# Patient Record
Sex: FEMALE | Race: WHITE | NOT HISPANIC OR LATINO | Employment: OTHER | RURAL
[De-identification: names, ages, dates, MRNs, and addresses within clinical notes are randomized per-mention and may not be internally consistent; named-entity substitution may affect disease eponyms.]

---

## 2019-01-17 RX ORDER — BUPROPION HYDROCHLORIDE 300 MG/1
TABLET ORAL
Qty: 30 TABLET | Refills: 5 | Status: SHIPPED | OUTPATIENT
Start: 2019-01-17 | End: 2019-07-17 | Stop reason: SDUPTHER

## 2019-01-22 ENCOUNTER — TELEPHONE (OUTPATIENT)
Dept: FAMILY MEDICINE CLINIC | Facility: CLINIC | Age: 54
End: 2019-01-22

## 2019-01-22 NOTE — TELEPHONE ENCOUNTER
called in for PT. Would like call in regards to referral to Orthopedic Doctor for shoulder issues.

## 2019-01-22 NOTE — TELEPHONE ENCOUNTER
Called  for PT. Was not available for call.  left message for PT stating that she would need to be seen before referral would be made.

## 2019-03-07 ENCOUNTER — TELEPHONE (OUTPATIENT)
Dept: FAMILY MEDICINE CLINIC | Facility: CLINIC | Age: 54
End: 2019-03-07

## 2019-03-07 NOTE — TELEPHONE ENCOUNTER
Please let patient know that we can't make a referral without office notes and test results.  I did send in a script of Voltaren for her.

## 2019-03-07 NOTE — TELEPHONE ENCOUNTER
For Isa *  She has an old  Rx of VOLTAREN GEL from Dr. Sally Hunter,   Pt is wondering if she can get a refill on this, for her back spasms.   * call was through  .     Also would like to know a back surgeon , she is needing repair. What is your opinion on the best surgeons to refer too.   Patient does not wish to make an appt, I have offered one for tomorrow or Monday    Please call - [phone] and leave message if she doesn't answer.

## 2019-07-18 RX ORDER — BUPROPION HYDROCHLORIDE 300 MG/1
TABLET ORAL
Qty: 30 TABLET | Refills: 5 | Status: SHIPPED | OUTPATIENT
Start: 2019-07-18 | End: 2020-06-23 | Stop reason: SDUPTHER

## 2019-08-26 ENCOUNTER — TELEPHONE (OUTPATIENT)
Dept: NEUROSURGERY | Age: 54
End: 2019-08-26

## 2019-08-30 ENCOUNTER — TELEPHONE (OUTPATIENT)
Dept: NEUROSURGERY | Age: 54
End: 2019-08-30

## 2019-11-05 PROBLEM — M96.1 LUMBAR POST-LAMINECTOMY SYNDROME: Status: ACTIVE | Noted: 2019-11-05

## 2019-11-05 PROBLEM — M51.37 DEGENERATION OF LUMBOSACRAL INTERVERTEBRAL DISC: Status: ACTIVE | Noted: 2019-11-05

## 2019-11-05 RX ORDER — METHOCARBAMOL 750 MG/1
750 TABLET, FILM COATED ORAL 4 TIMES DAILY PRN
COMMUNITY
End: 2020-10-06 | Stop reason: SDUPTHER

## 2019-11-05 RX ORDER — TEMAZEPAM 30 MG/1
1 CAPSULE ORAL DAILY
COMMUNITY
End: 2020-10-06 | Stop reason: SDUPTHER

## 2019-11-05 RX ORDER — PANTOPRAZOLE SODIUM 40 MG/1
TABLET, DELAYED RELEASE ORAL
COMMUNITY
End: 2020-09-28 | Stop reason: ALTCHOICE

## 2019-11-05 RX ORDER — MELATONIN
1000 DAILY
COMMUNITY
End: 2019-11-21 | Stop reason: ALTCHOICE

## 2019-11-21 ENCOUNTER — OFFICE VISIT (OUTPATIENT)
Dept: FAMILY MEDICINE CLINIC | Facility: CLINIC | Age: 54
End: 2019-11-21

## 2019-11-21 VITALS
HEART RATE: 72 BPM | BODY MASS INDEX: 23.8 KG/M2 | OXYGEN SATURATION: 95 % | DIASTOLIC BLOOD PRESSURE: 76 MMHG | WEIGHT: 139.4 LBS | HEIGHT: 64 IN | RESPIRATION RATE: 18 BRPM | SYSTOLIC BLOOD PRESSURE: 118 MMHG

## 2019-11-21 DIAGNOSIS — M25.552 PAIN OF LEFT HIP JOINT: ICD-10-CM

## 2019-11-21 DIAGNOSIS — G89.29 CHRONIC MIDLINE LOW BACK PAIN WITH LEFT-SIDED SCIATICA: ICD-10-CM

## 2019-11-21 DIAGNOSIS — R25.2 MUSCLE CRAMPS: ICD-10-CM

## 2019-11-21 DIAGNOSIS — R53.83 FATIGUE, UNSPECIFIED TYPE: Primary | ICD-10-CM

## 2019-11-21 DIAGNOSIS — M54.42 CHRONIC MIDLINE LOW BACK PAIN WITH LEFT-SIDED SCIATICA: ICD-10-CM

## 2019-11-21 DIAGNOSIS — R63.4 WEIGHT LOSS: ICD-10-CM

## 2019-11-21 PROCEDURE — 99214 OFFICE O/P EST MOD 30 MIN: CPT | Performed by: NURSE PRACTITIONER

## 2019-11-21 RX ORDER — FLUTICASONE PROPIONATE 50 MCG
2 SPRAY, SUSPENSION (ML) NASAL DAILY
COMMUNITY
End: 2021-03-05

## 2019-11-21 NOTE — PATIENT INSTRUCTIONS
Ketogenic Eating Plan, Adult  A ketogenic eating plan is a diet that is very low in carbohydrates, moderately low in protein, and very high in fat. Your body normally gets energy from eating carbohydrates. If you limit carbohydrates in your diet, your body will start to use stored fat as an energy source. When fat is broken down for energy, it enters your blood as a substance called ketones. A ketogenic eating plan relies mostly on ketones for energy. This eating plan can cause rapid weight loss because the body burns fat for fuel.  What are the benefits of a ketogenic eating plan?  Epilepsy  A ketogenic diet is sometimes used to treat epilepsy in children who have seizures that are not helped by medicines. As a treatment for epilepsy in children, ketogenic eating plans have been well studied and used for many years. This type of diet is usually started in the hospital and carefully monitored by a health care team. It is usually tried for several months to see if it will lessen seizures.  Other conditions  Ketogenic eating plans have also been studied to help treat other conditions, including:  · Cancer.  · Type 2 diabetes.  · Alzheimer's disease.  · Parkinson's disease.  · Autism.  · Multiple sclerosis.  More studies need to be done to see how well this eating plan works for these and other conditions and what the long-term side effects might be.  What are the side effects of a ketogenic eating plan?  Side effects of a ketogenic diet may include:  · Vitamin deficiencies.  · Loss of body fluids (dehydration).  · Bad breath.  · Nausea.  · Hunger.  · Difficulty passing stool (constipation).  · Problems with menstrual periods.  · Inflammation of the pancreas.  · Kidney stones or gall bladder stones.  · Bone fractures.  · High cholesterol.  What are tips for following this plan?  Reading food labels  · Look for foods that have a low glycemic index (GI) label.  · Read food ingredient lists to check for any hidden or  added sugar.  · Check food labels for the number of grams of carbohydrates and protein. This ensures that you are eating the right amounts of these nutrients. This is important.  Cooking  · Carefully measure or weigh foods.  · Make desserts using ketogenic or low GI recipes.  · Avoid cooking with sauces that contain added sugar, such as barbecue sauce or ketchup.  Meal planning  · Aim for a daily meal and snack time schedule that you can follow consistently.  · Every meal will include high-fat items such as avocado, cream, butter, or mayonnaise.  General information    · Buy a gram scale to weigh foods. This is necessary to follow this diet correctly and accurately. Your dietitian will teach you how to use one for this diet.  · Ask your health care provider what steps you can take to avoid side effects of this eating plan, such as constipation or kidney stones.  · Take vitamin and mineral supplements only as told by a health care provider or dietitian.  · Work with your dietitian and health care provider to handle the key challenges of this diet and to help you stay on track.  What foods should I eat?    Fruits  Fresh pineapple, peaches, and apples. Other fresh and frozen fruits in small amounts.  Vegetables  Lettuce. Beets. Bok annie. Eggplant. Tomatoes. Turnips. Cucumbers. Peppers. Radishes. Cauliflower. Zucchini. Fennel. Swiss chard.  Grains  Whole wheat bread. Bran cereal. Brown rice. Whole wheat pasta. Low GI cereals.  Meats and other proteins  Meat, poultry, and fish. Eggs. Egg substitutes. Nuts, seeds, lentils, and split peas in small amounts. Brown.  Dairy  Cheese in moderate amounts.  Beverages  Plain water. Sugar-free, caffeine-free beverages. Mineral water or club soda. Caffeine-free, carbohydrate-free herbal tea.  Fats and oils  Avocado. Cream. Sour cream. Cream cheese. Butter. Plant-based oils, such as olive, canola, and sunflower. Margarine. Mayonnaise.  Sweets and desserts  Any homemade sweets or  desserts made using ketogenic diet recipes.  The items listed above may not be a complete list of recommended foods and beverages. Contact a dietitian for more information.  What foods should I avoid?  Fruits  Fruit juice. Fruits packed in syrups. Dried or candied fruits.  Vegetables  Corn. Potatoes. Peas.  Grains  All bread, dry cereals, and cooked cereals with added sugar. Baked goods. Crackers and pretzels.  Meats and other proteins  Meat, poultry, or fish prepared with flour or breading. Nut butters with added sugar. Beans.  Dairy  Milk. Yogurt.  Fats and oils  Salad dressings with added sugar. Gravies.  Beverages  Sugar-sweetened teas, coffee drinks, or soft drinks. Juice. Sports drinks.  Sweets and desserts  All sweets and desserts, unless the dessert is homemade using ketogenic diet recipes.  The items listed above may not be a complete list of foods and beverages to avoid. Contact a dietitian for more information.  Summary  · A ketogenic eating plan is a diet that is very low in carbohydrates, moderately low in protein, and very high in fat.  · Aim for a daily meal and snack time schedule that you can follow consistently.  · Buy a gram scale to weigh foods. This is necessary to follow this diet correctly and accurately. Your dietitian will teach you how to use one for this diet.  · Work closely with your health care provider and a dietitian while you are following a ketogenic eating plan.  This information is not intended to replace advice given to you by your health care provider. Make sure you discuss any questions you have with your health care provider.  Document Released: 04/25/2019 Document Revised: 04/25/2019 Document Reviewed: 04/25/2019  [employer] Interactive Patient Education © 2019 [employer]

## 2019-11-21 NOTE — PROGRESS NOTES
Chief Complaint   Patient presents with   • Back Pain   • Medication Problem        Subjective   Hanna Luciano is a 54 y.o.  female who presents today for back pain and medication problem.    HPI:  BACK PAIN:  Please see log from  [id number].  Also note that the visit was started with an  that MA had started with and as soon as I started in the room, the call dropped.  I did not have her number.    Hanna Luciano  has a past medical history of Arthritis, Depression, Generalized headaches, GERD (gastroesophageal reflux disease), and Hyperlipidemia.    Allergies   Allergen Reactions   • Atorvastatin Unknown (See Comments)   • Baclofen Unknown (See Comments)   • Diphenhydramine Unknown (See Comments)   • Lorazepam Unknown (See Comments)   • Morphine Unknown (See Comments)   • Penicillins Unknown (See Comments)   • Soma  [Carisoprodol] Unknown (See Comments)   • Valproic Acid Unknown (See Comments)       Current Outpatient Medications:   •  buPROPion XL (WELLBUTRIN XL) 300 MG 24 hr tablet, TAKE ONE TABLET BY MOUTH EVERY DAY, Disp: 30 tablet, Rfl: 5  •  diclofenac (VOLTAREN) 1 % gel gel, Voltaren 1 % topical gel, Disp: , Rfl:   •  fluticasone (FLONASE) 50 MCG/ACT nasal spray, 2 sprays into the nostril(s) as directed by provider Daily., Disp: , Rfl:   •  methocarbamol (ROBAXIN) 750 MG tablet, Take 750 mg by mouth 4 (Four) Times a Day As Needed for Muscle Spasms., Disp: , Rfl:   •  pantoprazole (PROTONIX) 40 MG EC tablet, pantoprazole 40 mg tablet,delayed release, Disp: , Rfl:   •  temazepam (RESTORIL) 30 MG capsule, Take 1 capsule by mouth Daily., Disp: , Rfl:   Past Medical History:   Diagnosis Date   • Arthritis    • Depression    • Generalized headaches    • GERD (gastroesophageal reflux disease)    • Hyperlipidemia      Past Surgical History:   Procedure Laterality Date   • APPENDECTOMY     • BACK SURGERY  2003   • EYE SURGERY Left 1965    left eyebrow   • OOPHORECTOMY Left 2000      Social History     Socioeconomic History   • Marital status: Single     Spouse name: Not on file   • Number of children: Not on file   • Years of education: Not on file   • Highest education level: Not on file   Tobacco Use   • Smoking status: Former Smoker     Types: Cigarettes, Electronic Cigarette   • Smokeless tobacco: Never Used   Substance and Sexual Activity   • Alcohol use: No     Frequency: Never   • Drug use: Yes     Types: Marijuana   • Sexual activity: Defer     Family History   Problem Relation Age of Onset   • Hyperlipidemia Other    • Heart disease Other    • Hypertension Other    • Diabetes Other    • Cancer Other    • Arthritis Other    • Mental illness Other    • No Known Problems Mother    • No Known Problems Father        Family history, surgical history, past medical history, Allergies and med's reviewed with patient today and updated in [employer] EMR.     ROS:  Review of Systems   Constitutional: Positive for fatigue. Negative for fever and unexpected weight change.   HENT: Negative.  Negative for facial swelling, sore throat and trouble swallowing.    Eyes: Negative.  Negative for photophobia, discharge and visual disturbance.   Respiratory: Negative.  Negative for cough, chest tightness and shortness of breath.    Cardiovascular: Negative.  Negative for chest pain and palpitations.   Gastrointestinal: Negative.  Negative for abdominal pain, diarrhea, nausea and vomiting.   Endocrine: Negative.  Negative for polydipsia, polyphagia and polyuria.   Genitourinary: Negative.  Negative for dysuria, flank pain and frequency.   Musculoskeletal: Positive for arthralgias and back pain. Negative for gait problem and neck pain.        Left hip pain.  Low back pain.  Previous lumbar surgery.   Skin: Negative.  Negative for rash.   Allergic/Immunologic: Negative.    Neurological: Positive for weakness. Negative for dizziness, light-headedness, numbness and headaches.   Hematological: Negative.   "  Psychiatric/Behavioral: Negative.  Negative for self-injury and suicidal ideas.       OBJECTIVE:  Vitals:    11/21/19 1045   BP: 118/76   BP Location: Right arm   Patient Position: Sitting   Cuff Size: Adult   Pulse: 72   Resp: 18   SpO2: 95%   Weight: 63.2 kg (139 lb 6.4 oz)   Height: 162.6 cm (64\")     Physical Exam   Constitutional: She is oriented to person, place, and time. She appears well-developed and well-nourished. No distress.   HENT:   Head: Normocephalic and atraumatic.   Eyes: Conjunctivae and EOM are normal. Pupils are equal, round, and reactive to light.   Neck: Normal range of motion. Neck supple.   Cardiovascular: Normal rate, regular rhythm, normal heart sounds and intact distal pulses.   No murmur heard.  Pulmonary/Chest: Effort normal and breath sounds normal. No respiratory distress.   Abdominal: Soft. Bowel sounds are normal. She exhibits no distension. There is no tenderness.   Musculoskeletal: Normal range of motion. She exhibits tenderness. She exhibits no edema.   Mid lumbar tenderness and left paraspinal muscle tenderness as well to deep palpation/percussion.  Negative straight leg raise bilaterally.   Neurological: She is alert and oriented to person, place, and time.   Skin: Skin is warm and dry. Capillary refill takes less than 2 seconds. She is not diaphoretic. No erythema.   Psychiatric: She has a normal mood and affect. Her behavior is normal. Judgment and thought content normal.   Nursing note and vitals reviewed.      ASSESSMENT/ PLAN:    Hanna was seen today for back pain and medication problem.    Diagnoses and all orders for this visit:    Fatigue, unspecified type  -     Comprehensive metabolic panel  -     CBC w AUTO Differential    Muscle cramps  -     Magnesium    Weight loss  -     Comprehensive metabolic panel  -     CBC w AUTO Differential    Pain of left hip joint  -     ASAEL  -     Sedimentation rate, automated  -     C-reactive protein    Chronic midline low back pain " with left-sided sciatica  -     ASAEL  -     Sedimentation rate, automated  -     C-reactive protein    We will obtain results from DEXA performed at [facility] to see if further treatment is needed for possible osteoporosis or osteopenia.  She has a follow up with [facility] soon.  She came in wanting a steroid injection but I explained that if we give her an injection today, it could interfere with treatment planned at [facility]. She verbalized understanding.    Orders Placed Today:     No orders of the defined types were placed in this encounter.       Management Plan:     An After Visit Summary was printed and given to the patient at discharge.    Follow-up: Return if symptoms worsen or fail to improve.    Isa Vora, APRN 11/21/2019 11:16 AM  This note was electronically signed.

## 2019-11-22 LAB
ALBUMIN SERPL-MCNC: 4.3 G/DL (ref 3.5–5.5)
ALBUMIN/GLOB SERPL: 2 {RATIO} (ref 1.2–2.2)
ALP SERPL-CCNC: 72 IU/L (ref 39–117)
ALT SERPL-CCNC: 14 IU/L (ref 0–32)
ANA SER QL: NEGATIVE
AST SERPL-CCNC: 15 IU/L (ref 0–40)
BASOPHILS # BLD AUTO: 0.1 X10E3/UL (ref 0–0.2)
BASOPHILS NFR BLD AUTO: 1 %
BILIRUB SERPL-MCNC: <0.2 MG/DL (ref 0–1.2)
BUN SERPL-MCNC: 15 MG/DL (ref 6–24)
BUN/CREAT SERPL: 16 (ref 9–23)
CALCIUM SERPL-MCNC: 9.8 MG/DL (ref 8.7–10.2)
CHLORIDE SERPL-SCNC: 102 MMOL/L (ref 96–106)
CO2 SERPL-SCNC: 26 MMOL/L (ref 20–29)
CREAT SERPL-MCNC: 0.91 MG/DL (ref 0.57–1)
CRP SERPL-MCNC: 1 MG/L (ref 0–10)
EOSINOPHIL # BLD AUTO: 0.1 X10E3/UL (ref 0–0.4)
EOSINOPHIL NFR BLD AUTO: 2 %
ERYTHROCYTE [DISTWIDTH] IN BLOOD BY AUTOMATED COUNT: 13 % (ref 12.3–15.4)
ERYTHROCYTE [SEDIMENTATION RATE] IN BLOOD BY WESTERGREN METHOD: 11 MM/HR (ref 0–40)
GLOBULIN SER CALC-MCNC: 2.2 G/DL (ref 1.5–4.5)
GLUCOSE SERPL-MCNC: 93 MG/DL (ref 65–99)
HCT VFR BLD AUTO: 39.5 % (ref 34–46.6)
HGB BLD-MCNC: 13.1 G/DL (ref 11.1–15.9)
IMM GRANULOCYTES # BLD AUTO: 0 X10E3/UL (ref 0–0.1)
IMM GRANULOCYTES NFR BLD AUTO: 0 %
LYMPHOCYTES # BLD AUTO: 1.1 X10E3/UL (ref 0.7–3.1)
LYMPHOCYTES NFR BLD AUTO: 23 %
MAGNESIUM SERPL-MCNC: 2 MG/DL (ref 1.6–2.3)
MCH RBC QN AUTO: 32.8 PG (ref 26.6–33)
MCHC RBC AUTO-ENTMCNC: 33.2 G/DL (ref 31.5–35.7)
MCV RBC AUTO: 99 FL (ref 79–97)
MONOCYTES # BLD AUTO: 0.5 X10E3/UL (ref 0.1–0.9)
MONOCYTES NFR BLD AUTO: 9 %
NEUTROPHILS # BLD AUTO: 3.1 X10E3/UL (ref 1.4–7)
NEUTROPHILS NFR BLD AUTO: 65 %
PLATELET # BLD AUTO: 243 X10E3/UL (ref 150–450)
POTASSIUM SERPL-SCNC: 4.3 MMOL/L (ref 3.5–5.2)
PROT SERPL-MCNC: 6.5 G/DL (ref 6–8.5)
RBC # BLD AUTO: 3.99 X10E6/UL (ref 3.77–5.28)
SODIUM SERPL-SCNC: 143 MMOL/L (ref 134–144)
WBC # BLD AUTO: 4.9 X10E3/UL (ref 3.4–10.8)

## 2019-12-23 ENCOUNTER — OFFICE VISIT (OUTPATIENT)
Dept: FAMILY MEDICINE CLINIC | Facility: CLINIC | Age: 54
End: 2019-12-23

## 2019-12-23 VITALS
BODY MASS INDEX: 23.9 KG/M2 | OXYGEN SATURATION: 97 % | WEIGHT: 140 LBS | HEIGHT: 64 IN | HEART RATE: 69 BPM | DIASTOLIC BLOOD PRESSURE: 60 MMHG | SYSTOLIC BLOOD PRESSURE: 124 MMHG

## 2019-12-23 DIAGNOSIS — H65.23 BILATERAL CHRONIC SEROUS OTITIS MEDIA: ICD-10-CM

## 2019-12-23 DIAGNOSIS — M51.37 DEGENERATION OF LUMBOSACRAL INTERVERTEBRAL DISC: Primary | ICD-10-CM

## 2019-12-23 DIAGNOSIS — J01.00 ACUTE NON-RECURRENT MAXILLARY SINUSITIS: ICD-10-CM

## 2019-12-23 DIAGNOSIS — M96.1 LUMBAR POST-LAMINECTOMY SYNDROME: ICD-10-CM

## 2019-12-23 PROCEDURE — 99214 OFFICE O/P EST MOD 30 MIN: CPT | Performed by: FAMILY MEDICINE

## 2019-12-23 PROCEDURE — 96372 THER/PROPH/DIAG INJ SC/IM: CPT | Performed by: FAMILY MEDICINE

## 2019-12-23 RX ORDER — CEFTRIAXONE 500 MG/1
500 INJECTION, POWDER, FOR SOLUTION INTRAMUSCULAR; INTRAVENOUS EVERY 24 HOURS
Status: COMPLETED | OUTPATIENT
Start: 2019-12-23 | End: 2019-12-23

## 2019-12-23 RX ORDER — CEFUROXIME AXETIL 500 MG/1
500 TABLET ORAL 2 TIMES DAILY
Qty: 20 TABLET | Refills: 0 | Status: SHIPPED | OUTPATIENT
Start: 2019-12-23 | End: 2020-01-02

## 2019-12-23 RX ORDER — METHYLPREDNISOLONE ACETATE 80 MG/ML
80 INJECTION, SUSPENSION INTRA-ARTICULAR; INTRALESIONAL; INTRAMUSCULAR; SOFT TISSUE ONCE
Status: COMPLETED | OUTPATIENT
Start: 2019-12-23 | End: 2019-12-23

## 2019-12-23 RX ORDER — METHYLPREDNISOLONE 4 MG/1
TABLET ORAL
Qty: 21 EACH | Refills: 0 | Status: SHIPPED | OUTPATIENT
Start: 2019-12-23 | End: 2020-02-07

## 2019-12-23 RX ADMIN — CEFTRIAXONE 500 MG: 500 INJECTION, POWDER, FOR SOLUTION INTRAMUSCULAR; INTRAVENOUS at 12:12

## 2019-12-23 RX ADMIN — METHYLPREDNISOLONE ACETATE 80 MG: 80 INJECTION, SUSPENSION INTRA-ARTICULAR; INTRALESIONAL; INTRAMUSCULAR; SOFT TISSUE at 12:12

## 2019-12-23 NOTE — PATIENT INSTRUCTIONS
Chronic Back Pain  When back pain lasts longer than 3 months, it is called chronic back pain. The cause of your back pain may not be known. Some common causes include:  · Wear and tear (degenerative disease) of the bones, ligaments, or disks in your back.  · Inflammation and stiffness in your back (arthritis).  People who have chronic back pain often go through certain periods in which the pain is more intense (flare-ups). Many people can learn to manage the pain with home care.  Follow these instructions at home:  Pay attention to any changes in your symptoms. Take these actions to help with your pain:  Activity    · Avoid bending and other activities that make the problem worse.  · Maintain a proper position when standing or sitting:  ? When standing, keep your upper back and neck straight, with your shoulders pulled back. Avoid slouching.  ? When sitting, keep your back straight and relax your shoulders. Do not round your shoulders or pull them backward.  · Do not sit or  one place for long periods of time.  · Take brief periods of rest throughout the day. This will reduce your pain. Resting in a lying or standing position is usually better than sitting to rest.  · When you are resting for longer periods, mix in some mild activity or stretching between periods of rest. This will help to prevent stiffness and pain.  · Get regular exercise. Ask your health care provider what activities are safe for you.  · Do not lift anything that is heavier than 10 lb (4.5 kg). Always use proper lifting technique, which includes:  ? Bending your knees.  ? Keeping the load close to your body.  ? Avoiding twisting.  · Sleep on a firm mattress in a comfortable position. Try lying on your side with your knees slightly bent. If you lie on your back, put a pillow under your knees.  Managing pain  · If directed, apply ice to the painful area. Your health care provider may recommend applying ice during the first 24-48 hours after  a flare-up begins.  ? Put ice in a plastic bag.  ? Place a towel between your skin and the bag.  ? Leave the ice on for 20 minutes, 2-3 times per day.  · If directed, apply heat to the affected area as often as told by your health care provider. Use the heat source that your health care provider recommends, such as a moist heat pack or a heating pad.  ? Place a towel between your skin and the heat source.  ? Leave the heat on for 20-30 minutes.  ? Remove the heat if your skin turns bright red. This is especially important if you are unable to feel pain, heat, or cold. You may have a greater risk of getting burned.  · Try soaking in a warm tub.  · Take over-the-counter and prescription medicines only as told by your health care provider.  · Keep all follow-up visits as told by your health care provider. This is important.  Contact a health care provider if:  · You have pain that is not relieved with rest or medicine.  Get help right away if:  · You have weakness or numbness in one or both of your legs or feet.  · You have trouble controlling your bladder or your bowels.  · You have nausea or vomiting.  · You have pain in your abdomen.  · You have shortness of breath or you faint.  This information is not intended to replace advice given to you by your health care provider. Make sure you discuss any questions you have with your health care provider.  Document Released: 01/25/2006 Document Revised: 07/25/2019 Document Reviewed: 06/27/2018  [employer] Interactive Patient Education © 2019 [employer]

## 2019-12-23 NOTE — PROGRESS NOTES
OFFICE VISIT NOTE:    Hanna Luciano is a 54 y.o. female who presents today for Facial Pain; Disability paperwork; and Back Pain.     Dr Cruz is doing injections in the back and that helps some, but not resolved the issue. Sees Dr Beth for Neurology follow up. Does ride her bike but needs to set a limit - wants to stay active, but tries not to overdo it. 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     I used the iPad with the  interpretor during the interview and exam - see nurses notes for interpretor ID and name. (Trish - ID [id number])    Facial Pain   This is a recurrent problem. The current episode started 1 to 4 weeks ago. The problem occurs intermittently. The problem has been waxing and waning. Associated symptoms include congestion and a sore throat. Pertinent negatives include no abdominal pain, chest pain, fatigue, fever, nausea, rash or visual change. The symptoms are aggravated by coughing. She has tried drinking, acetaminophen and rest for the symptoms. The treatment provided mild relief.   Back Pain   This is a chronic problem. The current episode started more than 1 year ago. The problem occurs constantly. The problem is unchanged. The pain is present in the lumbar spine and sacro-iliac. The quality of the pain is described as cramping and aching. The pain does not radiate. The pain is at a severity of 8/10. The pain is severe. The pain is the same all the time. The symptoms are aggravated by standing and twisting. Stiffness is present all day. Pertinent negatives include no abdominal pain, chest pain, fever or weight loss. She has tried analgesics, bed rest and home exercises for the symptoms. The treatment provided moderate relief.        Past medical/surgical history, Family history, Social history, Allergies and Medications have been reviewed with the patient today and are updated in [facility] EMR. See below.    Past Medical History:   Diagnosis  Date   • Arthritis    • Depression    • Generalized headaches    • GERD (gastroesophageal reflux disease)    • Hyperlipidemia      Past Surgical History:   Procedure Laterality Date   • APPENDECTOMY     • BACK SURGERY  2003   • EYE SURGERY Left 1965    left eyebrow   • OOPHORECTOMY Left 2000     Family History   Problem Relation Age of Onset   • Hyperlipidemia Other    • Heart disease Other    • Hypertension Other    • Diabetes Other    • Cancer Other    • Arthritis Other    • Mental illness Other    • No Known Problems Mother    • No Known Problems Father      Social History     Tobacco Use   • Smoking status: Former Smoker     Types: Cigarettes, Electronic Cigarette   • Smokeless tobacco: Never Used   Substance Use Topics   • Alcohol use: No     Frequency: Never   • Drug use: Yes     Types: Marijuana       ALLERGIES:  Atorvastatin; Baclofen; Diphenhydramine; Lorazepam; Morphine; Penicillins; Soma  [carisoprodol]; and Valproic acid    CURRENT MEDS:    Current Outpatient Medications:   •  buPROPion XL (WELLBUTRIN XL) 300 MG 24 hr tablet, TAKE ONE TABLET BY MOUTH EVERY DAY, Disp: 30 tablet, Rfl: 5  •  Calcium-Magnesium-Vitamin D (CITRACAL CALCIUM+D PO), Take  by mouth., Disp: , Rfl:   •  diclofenac (VOLTAREN) 1 % gel gel, Voltaren 1 % topical gel, Disp: , Rfl:   •  fluticasone (FLONASE) 50 MCG/ACT nasal spray, 2 sprays into the nostril(s) as directed by provider Daily., Disp: , Rfl:   •  methocarbamol (ROBAXIN) 750 MG tablet, Take 750 mg by mouth 4 (Four) Times a Day As Needed for Muscle Spasms., Disp: , Rfl:   •  pantoprazole (PROTONIX) 40 MG EC tablet, pantoprazole 40 mg tablet,delayed release, Disp: , Rfl:   •  temazepam (RESTORIL) 30 MG capsule, Take 1 capsule by mouth Daily., Disp: , Rfl:   •  Turmeric Curcumin 500 MG capsule, Take  by mouth., Disp: , Rfl:   •  cefuroxime (CEFTIN) 500 MG tablet, Take 1 tablet by mouth 2 (Two) Times a Day for 10 days., Disp: 20 tablet, Rfl: 0  •  fluconazole (DIFLUCAN) 150 MG  "tablet, Take 1 tablet by mouth 1 (One) Time for 1 dose., Disp: 1 tablet, Rfl: 0  •  methylPREDNISolone (MEDROL, NORBERTO,) 4 MG tablet, Take as directed on package instructions. HOLD NSAIDs while on this medicine., Disp: 21 each, Rfl: 0    REVIEW OF SYSTEMS:  Review of Systems   Constitutional: Negative for activity change, fatigue, fever, unexpected weight gain and unexpected weight loss.   HENT: Positive for congestion and sore throat.    Respiratory: Negative for shortness of breath.    Cardiovascular: Negative for chest pain.   Gastrointestinal: Negative for abdominal pain and nausea.   Genitourinary: Negative for difficulty urinating.   Musculoskeletal: Positive for back pain.   Skin: Negative for rash.   Neurological: Negative for syncope and headache.       PHYSICAL EXAMINATION:  Vital Signs:  /60 (BP Location: Left arm, Patient Position: Sitting, Cuff Size: Adult)   Pulse 69   Ht 162.6 cm (64\")   Wt 63.5 kg (140 lb)   LMP  (LMP Unknown)   SpO2 97%   Breastfeeding No   BMI 24.03 kg/m²   Physical Exam   Constitutional: She is oriented to person, place, and time. She appears well-developed and well-nourished. No distress.   HENT:   Head: Normocephalic and atraumatic.   Right Ear: A middle ear effusion is present.   Left Ear: A middle ear effusion is present.   Nose: Mucosal edema and rhinorrhea present. Right sinus exhibits maxillary sinus tenderness and frontal sinus tenderness. Left sinus exhibits maxillary sinus tenderness and frontal sinus tenderness.   Mouth/Throat: Oropharynx is clear and moist.   Neck: Normal range of motion. Neck supple. No JVD present.   Cardiovascular: Normal rate, regular rhythm, normal heart sounds and intact distal pulses.   Pulmonary/Chest: Effort normal and breath sounds normal. No respiratory distress.   Musculoskeletal: Normal range of motion. She exhibits no edema.   Neurological: She is alert and oriented to person, place, and time. No cranial nerve deficit.   Skin: " Skin is warm and dry. Capillary refill takes less than 2 seconds. No rash noted.   Psychiatric: She has a normal mood and affect. Her behavior is normal.   Nursing note and vitals reviewed.      Procedures    ASSESSMENT/ PLAN:  Problem List Items Addressed This Visit        Musculoskeletal and Integument    Degeneration of lumbosacral intervertebral disc - Primary       Other    Lumbar post-laminectomy syndrome      Other Visit Diagnoses     Acute non-recurrent maxillary sinusitis        Relevant Medications    cefTRIAXone (ROCEPHIN) injection 500 mg (Completed)    methylPREDNISolone acetate (DEPO-medrol) injection 80 mg (Completed)    cefuroxime (CEFTIN) 500 MG tablet    methylPREDNISolone (MEDROL, NORBERTO,) 4 MG tablet    Bilateral chronic serous otitis media        Relevant Medications    cefTRIAXone (ROCEPHIN) injection 500 mg (Completed)    methylPREDNISolone acetate (DEPO-medrol) injection 80 mg (Completed)    cefuroxime (CEFTIN) 500 MG tablet    methylPREDNISolone (MEDROL, NORBERTO,) 4 MG tablet            Specific Patient Instructions:  MEDICATION Instructions: Encouraged patient to continue routine medicines as prescribed and maintain compliance. Patient instructed to report any adverse side effects or reactions to medicines promptly to the office. Patient instructed to make us aware of any OTC or herbal meds or supplement use.  DIET Recommendations: Patient instructed and provided information on the following nutrition and DIET(s): Calorie restriction for weight reduction and maintenance. Necessity for adequate daily intake of fluids/water.  EXERCISE Instructions: Discussed with patient the need for routine aerobic activity for cardiovascular fitness, 3 times a week for about 30 minutes. Daily exercise for increased fitness and weight reduction goals.    Patient's Body mass index is 24.03 kg/m². BMI is within normal parameters. No follow-up required..      SMOKING Recommendations: Counseled patient and encouraged  them on smoking cessation. Discussed the benefits to all body systems with smoking cessation, including decreased cardiac/lung/stroke/cancer risk.  HEALTH MAINTENANCE:  Counseling provided to patient/family about routine health maintenance and ANNUAL physicals/labs. Counseling on recommended Vaccinations appropriate for age needed.  MISCELLANEOUS Instructions: N/A      Medications or Orders placed this visit:  No orders of the defined types were placed in this encounter.      Medications DISCONTINUED this visit:  There are no discontinued medications.    FOLLOW-UP:  Return in about 3 months (around 3/23/2020) for Recheck.    I discussed the patients findings and my recommendations with patient.  An After Visit Summary (AVS) was printed and given to the patient at discharge.      Damion Rey MD, FAAFP  12/26/2019

## 2019-12-26 ENCOUNTER — TELEPHONE (OUTPATIENT)
Dept: FAMILY MEDICINE CLINIC | Facility: CLINIC | Age: 54
End: 2019-12-26

## 2019-12-26 DIAGNOSIS — B37.31 VAGINAL CANDIDIASIS: Primary | ICD-10-CM

## 2019-12-26 RX ORDER — FLUCONAZOLE 150 MG/1
150 TABLET ORAL ONCE
Qty: 1 TABLET | Refills: 0 | Status: SHIPPED | OUTPATIENT
Start: 2019-12-26 | End: 2019-12-26

## 2019-12-26 NOTE — TELEPHONE ENCOUNTER
Pt called, stated that she was recently given Antibiotics. She advised that as a result she has a yeast infection. She is asking that medication for this be sent to [facility].

## 2020-02-07 ENCOUNTER — OFFICE VISIT (OUTPATIENT)
Dept: FAMILY MEDICINE CLINIC | Facility: CLINIC | Age: 55
End: 2020-02-07

## 2020-02-07 VITALS
OXYGEN SATURATION: 98 % | SYSTOLIC BLOOD PRESSURE: 124 MMHG | HEIGHT: 64 IN | BODY MASS INDEX: 23.29 KG/M2 | DIASTOLIC BLOOD PRESSURE: 60 MMHG | TEMPERATURE: 101.3 F | WEIGHT: 136.4 LBS | HEART RATE: 94 BPM

## 2020-02-07 DIAGNOSIS — H65.03 NON-RECURRENT ACUTE SEROUS OTITIS MEDIA OF BOTH EARS: ICD-10-CM

## 2020-02-07 DIAGNOSIS — H65.23 BILATERAL CHRONIC SEROUS OTITIS MEDIA: ICD-10-CM

## 2020-02-07 DIAGNOSIS — J01.00 ACUTE NON-RECURRENT MAXILLARY SINUSITIS: Primary | ICD-10-CM

## 2020-02-07 PROCEDURE — 96372 THER/PROPH/DIAG INJ SC/IM: CPT | Performed by: FAMILY MEDICINE

## 2020-02-07 PROCEDURE — 99214 OFFICE O/P EST MOD 30 MIN: CPT | Performed by: FAMILY MEDICINE

## 2020-02-07 RX ORDER — CEFUROXIME AXETIL 500 MG/1
500 TABLET ORAL 2 TIMES DAILY
Qty: 20 TABLET | Refills: 0 | Status: SHIPPED | OUTPATIENT
Start: 2020-02-07 | End: 2020-02-17

## 2020-02-07 RX ORDER — FEXOFENADINE HCL AND PSEUDOEPHEDRINE HCI 60; 120 MG/1; MG/1
1 TABLET, EXTENDED RELEASE ORAL 2 TIMES DAILY PRN
Qty: 30 TABLET | Refills: 2 | Status: SHIPPED | OUTPATIENT
Start: 2020-02-07 | End: 2020-09-30

## 2020-02-07 RX ORDER — METHYLPREDNISOLONE ACETATE 40 MG/ML
40 INJECTION, SUSPENSION INTRA-ARTICULAR; INTRALESIONAL; INTRAMUSCULAR; SOFT TISSUE ONCE
Status: COMPLETED | OUTPATIENT
Start: 2020-02-07 | End: 2020-02-07

## 2020-02-07 RX ORDER — CEFTRIAXONE 1 G/1
1 INJECTION, POWDER, FOR SOLUTION INTRAMUSCULAR; INTRAVENOUS ONCE
Status: COMPLETED | OUTPATIENT
Start: 2020-02-07 | End: 2020-02-07

## 2020-02-07 RX ADMIN — CEFTRIAXONE 1 G: 1 INJECTION, POWDER, FOR SOLUTION INTRAMUSCULAR; INTRAVENOUS at 11:38

## 2020-02-07 RX ADMIN — METHYLPREDNISOLONE ACETATE 40 MG: 40 INJECTION, SUSPENSION INTRA-ARTICULAR; INTRALESIONAL; INTRAMUSCULAR; SOFT TISSUE at 11:38

## 2020-02-07 NOTE — PATIENT INSTRUCTIONS
Sinusitis, Adult  Sinusitis is inflammation of your sinuses. Sinuses are hollow spaces in the bones around your face. Your sinuses are located:  · Around your eyes.  · In the middle of your forehead.  · Behind your nose.  · In your cheekbones.  Mucus normally drains out of your sinuses. When your nasal tissues become inflamed or swollen, mucus can become trapped or blocked. This allows bacteria, viruses, and fungi to grow, which leads to infection. Most infections of the sinuses are caused by a virus.  Sinusitis can develop quickly. It can last for up to 4 weeks (acute) or for more than 12 weeks (chronic). Sinusitis often develops after a cold.  What are the causes?  This condition is caused by anything that creates swelling in the sinuses or stops mucus from draining. This includes:  · Allergies.  · Asthma.  · Infection from bacteria or viruses.  · Deformities or blockages in your nose or sinuses.  · Abnormal growths in the nose (nasal polyps).  · Pollutants, such as chemicals or irritants in the air.  · Infection from fungi (rare).  What increases the risk?  You are more likely to develop this condition if you:  · Have a weak body defense system (immune system).  · Do a lot of swimming or diving.  · Overuse nasal sprays.  · Smoke.  What are the signs or symptoms?  The main symptoms of this condition are pain and a feeling of pressure around the affected sinuses. Other symptoms include:  · Stuffy nose or congestion.  · Thick drainage from your nose.  · Swelling and warmth over the affected sinuses.  · Headache.  · Upper toothache.  · A cough that may get worse at night.  · Extra mucus that collects in the throat or the back of the nose (postnasal drip).  · Decreased sense of smell and taste.  · Fatigue.  · A fever.  · Sore throat.  · Bad breath.  How is this diagnosed?  This condition is diagnosed based on:  · Your symptoms.  · Your medical history.  · A physical exam.  · Tests to find out if your condition is  acute or chronic. This may include:  ? Checking your nose for nasal polyps.  ? Viewing your sinuses using a device that has a light (endoscope).  ? Testing for allergies or bacteria.  ? Imaging tests, such as an MRI or CT scan.  In rare cases, a bone biopsy may be done to rule out more serious types of fungal sinus disease.  How is this treated?  Treatment for sinusitis depends on the cause and whether your condition is chronic or acute.  · If caused by a virus, your symptoms should go away on their own within 10 days. You may be given medicines to relieve symptoms. They include:  ? Medicines that shrink swollen nasal passages (topical intranasal decongestants).  ? Medicines that treat allergies (antihistamines).  ? A spray that eases inflammation of the nostrils (topical intranasal corticosteroids).  ? Rinses that help get rid of thick mucus in your nose (nasal saline washes).  · If caused by bacteria, your health care provider may recommend waiting to see if your symptoms improve. Most bacterial infections will get better without antibiotic medicine. You may be given antibiotics if you have:  ? A severe infection.  ? A weak immune system.  · If caused by narrow nasal passages or nasal polyps, you may need to have surgery.  Follow these instructions at home:  Medicines  · Take, use, or apply over-the-counter and prescription medicines only as told by your health care provider. These may include nasal sprays.  · If you were prescribed an antibiotic medicine, take it as told by your health care provider. Do not stop taking the antibiotic even if you start to feel better.  Hydrate and humidify    · Drink enough fluid to keep your urine pale yellow. Staying hydrated will help to thin your mucus.  · Use a cool mist humidifier to keep the humidity level in your home above 50%.  · Inhale steam for 10-15 minutes, 3-4 times a day, or as told by your health care provider. You can do this in the bathroom while a hot shower is  running.  · Limit your exposure to cool or dry air.  Rest  · Rest as much as possible.  · Sleep with your head raised (elevated).  · Make sure you get enough sleep each night.  General instructions    · Apply a warm, moist washcloth to your face 3-4 times a day or as told by your health care provider. This will help with discomfort.  · Wash your hands often with soap and water to reduce your exposure to germs. If soap and water are not available, use hand .  · Do not smoke. Avoid being around people who are smoking (secondhand smoke).  · Keep all follow-up visits as told by your health care provider. This is important.  Contact a health care provider if:  · You have a fever.  · Your symptoms get worse.  · Your symptoms do not improve within 10 days.  Get help right away if:  · You have a severe headache.  · You have persistent vomiting.  · You have severe pain or swelling around your face or eyes.  · You have vision problems.  · You develop confusion.  · Your neck is stiff.  · You have trouble breathing.  Summary  · Sinusitis is soreness and inflammation of your sinuses. Sinuses are hollow spaces in the bones around your face.  · This condition is caused by nasal tissues that become inflamed or swollen. The swelling traps or blocks the flow of mucus. This allows bacteria, viruses, and fungi to grow, which leads to infection.  · If you were prescribed an antibiotic medicine, take it as told by your health care provider. Do not stop taking the antibiotic even if you start to feel better.  · Keep all follow-up visits as told by your health care provider. This is important.  This information is not intended to replace advice given to you by your health care provider. Make sure you discuss any questions you have with your health care provider.  Document Released: 12/18/2006 Document Revised: 05/20/2019 Document Reviewed: 05/20/2019  [employer] Interactive Patient Education © 2019 [employer]      Otitis Media  With Effusion, Pediatric    Otitis media with effusion (OME) occurs when there is inflammation of the middle ear and fluid in the middle ear space. There are no signs and symptoms of infection. The middle ear space contains air and the bones for hearing. Air in the middle ear space helps to transmit sound to the brain.  OME is a common condition in children, and it often occurs after an ear infection. This condition may be present for several weeks or longer after an ear infection. Most cases of this condition get better on their own.  What are the causes?  OME is caused by a blockage of the eustachian tube in one or both ears. These tubes drain fluid in the ears to the back of the nose (nasopharynx). If the tissue in the tube swells up (edema), the tube closes. This prevents fluid from draining. Blockage can be caused by:  · Ear infections.  · Colds and other upper respiratory infections.  · Allergies.  · Irritants, such as tobacco smoke.  · Enlarged adenoids. The adenoids are areas of soft tissue located high in the back of the throat, behind the nose and the roof of the mouth. They are part of the body’s natural defense (immune) system.  · A mass in the nasopharynx.  · Damage to the ear caused by pressure changes (barotrauma).  What increases the risk?  Your child is more likely to develop this condition if:  · He or she has repeated ear and sinus infections.  · He or she has allergies.  · He or she is exposed to tobacco smoke.  · He or she attends .  · He or she is not .  What are the signs or symptoms?  Symptoms of this condition may not be obvious. Sometimes this condition does not have any symptoms, or symptoms may overlap with those of a cold or upper respiratory tract illness.  Symptoms of this condition include:  · Temporary hearing loss.  · A feeling of fullness in the ear without pain.  · Irritability or agitation.  · Balance (vestibular) problems.  As a result of hearing loss, your child  "may:  · Listen to the TV at a loud volume.  · Not respond to questions.  · Ask \"What?\" often when spoken to.  · Mistake or confuse one sound or word for another.  · Perform poorly at school.  · Have a poor attention span.  · Become agitated or irritated easily.  How is this diagnosed?  This condition is diagnosed with an ear exam. Your child's health care provider will look inside your child's ear with an instrument (otoscope) to check for redness, swelling, and fluid.  Other tests may be done, including:  · A test to check the movement of the eardrum (pneumatic otoscopy). This is done by squeezing a small amount of air into the ear.  · A test that changes air pressure in the middle ear to check how well the eardrum moves and to see if the eustachian tube is working (tympanogram).  · Hearing test (audiogram). This test involves playing tones at different pitches to see if your child can hear each tone.  How is this treated?  Treatment for this condition depends on the cause. In many cases, the fluid goes away on its own.  In some cases, your child may need a procedure to create a hole in the eardrum to allow fluid to drain (myringotomy) and to insert small drainage tubes (tympanostomy tubes) into the eardrums. These tubes help to drain fluid and prevent infection. This procedure may be recommended if:  · OME does not get better over several months.  · Your child has many ear infections within several months.  · Your child has noticeable hearing loss.  · Your child has problems with speech and language development.  Surgery may also be done to remove the adenoids (adenoidectomy).  Follow these instructions at home:  · Give over-the-counter and prescription medicines only as told by your child's health care provider.  · Keep children away from any tobacco smoke.  · Keep all follow-up visits as told by your child's health care provider. This is important.  How is this prevented?  · Keep your child's vaccinations up to " date. Make sure your child gets all recommended vaccinations, including a pneumonia and flu vaccine.  · Encourage hand washing. Your child should wash his or her hands often with soap and water. If there is no soap and water, he or she should use hand .  · Avoid exposing your child to tobacco smoke.  · Breastfeed your baby, if possible. Babies who are  as long as possible are less likely to develop this condition.  Contact a health care provider if:  · Your child's hearing does not get better after 3 months.  · Your child's hearing is worse.  · Your child has ear pain.  · Your child has a fever.  · Your child has drainage from the ear.  · Your child is dizzy.  · Your child has a lump on his or her neck.  Get help right away if:  · Your child has bleeding from the nose.  · Your child cannot move part of her or his face.  · Your child has trouble breathing.  · Your child cannot smell.  · Your child develops severe congestion.  · Your child develops weakness.  · Your child who is younger than 3 months has a temperature of 100°F (38°C) or higher.  Summary  · Otitis media with effusion (OME) occurs when there is inflammation of the middle ear and fluid in the middle ear space.  · This condition is caused by blockage of one or both eustachian tubes, which drain fluid in the ears to the back of the nose.  · Symptoms of this condition can include temporary hearing loss, a feeling of fullness in the ear, irritability or agitation, and balance (vestibular) problems. Sometimes, there are no symptoms.  · This condition is diagnosed with an ear exam and tests, such as pneumatic otoscopy, tympanogram, and audiogram.  · Treatment for this condition depends on the cause. In many cases, the fluid goes away on its own.  This information is not intended to replace advice given to you by your health care provider. Make sure you discuss any questions you have with your health care provider.  Document Released:  03/09/2005 Document Revised: 09/13/2019 Document Reviewed: 11/09/2017  [employer] Interactive Patient Education © 2019 [employer]

## 2020-02-07 NOTE — PROGRESS NOTES
OFFICE VISIT NOTE:    Hanna Luciano is a 54 y.o. female who presents today for Ear Problem (: Jenny , ID# [id number], R ear).     Has some sinus congestion noted also, no fever.  Interviewed and examined with the  via iPad.    Ear Fullness    There is pain in the right ear. This is a recurrent problem. The current episode started 1 to 4 weeks ago. The problem occurs every few hours. The problem has been waxing and waning. There has been no fever. The pain is moderate. Associated symptoms include headaches, rhinorrhea and a sore throat. Pertinent negatives include no abdominal pain, coughing, ear discharge or rash. She has tried acetaminophen for the symptoms. The treatment provided mild relief.        Past medical/surgical history, Family history, Social history, Allergies and Medications have been reviewed with the patient today and are updated in [facility] EMR. See below.    Past Medical History:   Diagnosis Date   • Arthritis    • Depression    • Generalized headaches    • GERD (gastroesophageal reflux disease)    • Hyperlipidemia      Past Surgical History:   Procedure Laterality Date   • APPENDECTOMY     • BACK SURGERY  2003   • EYE SURGERY Left 1965    left eyebrow   • OOPHORECTOMY Left 2000     Family History   Problem Relation Age of Onset   • Hyperlipidemia Other    • Heart disease Other    • Hypertension Other    • Diabetes Other    • Cancer Other    • Arthritis Other    • Mental illness Other    • No Known Problems Mother    • No Known Problems Father      Social History     Tobacco Use   • Smoking status: Former Smoker     Types: Cigarettes, Electronic Cigarette   • Smokeless tobacco: Never Used   Substance Use Topics   • Alcohol use: No     Frequency: Never   • Drug use: Yes     Types: Marijuana       ALLERGIES:  Atorvastatin; Baclofen; Diphenhydramine; Lorazepam; Morphine; Penicillins; Soma  [carisoprodol]; and Valproic acid    CURRENT MEDS:    Current Outpatient Medications:   •   "buPROPion XL (WELLBUTRIN XL) 300 MG 24 hr tablet, TAKE ONE TABLET BY MOUTH EVERY DAY, Disp: 30 tablet, Rfl: 5  •  Calcium-Magnesium-Vitamin D (CITRACAL CALCIUM+D PO), Take  by mouth., Disp: , Rfl:   •  diclofenac (VOLTAREN) 1 % gel gel, Voltaren 1 % topical gel, Disp: , Rfl:   •  fluticasone (FLONASE) 50 MCG/ACT nasal spray, 2 sprays into the nostril(s) as directed by provider Daily., Disp: , Rfl:   •  methocarbamol (ROBAXIN) 750 MG tablet, Take 750 mg by mouth 4 (Four) Times a Day As Needed for Muscle Spasms., Disp: , Rfl:   •  pantoprazole (PROTONIX) 40 MG EC tablet, pantoprazole 40 mg tablet,delayed release, Disp: , Rfl:   •  temazepam (RESTORIL) 30 MG capsule, Take 1 capsule by mouth Daily., Disp: , Rfl:   •  Turmeric Curcumin 500 MG capsule, Take  by mouth., Disp: , Rfl:   •  cefuroxime (CEFTIN) 500 MG tablet, Take 1 tablet by mouth 2 (Two) Times a Day for 10 days., Disp: 20 tablet, Rfl: 0  •  fexofenadine-pseudoephedrine (ALLEGRA-D)  MG per 12 hr tablet, Take 1 tablet by mouth 2 (Two) Times a Day As Needed for Allergies (sinus congestion)., Disp: 30 tablet, Rfl: 2    REVIEW OF SYSTEMS:  Review of Systems   Constitutional: Negative for activity change, fatigue, fever, unexpected weight gain and unexpected weight loss.   HENT: Positive for rhinorrhea and sore throat. Negative for ear discharge.    Respiratory: Negative for cough and shortness of breath.    Cardiovascular: Negative for chest pain.   Gastrointestinal: Negative for abdominal pain.   Genitourinary: Negative for difficulty urinating.   Skin: Negative for rash.   Neurological: Negative for syncope and headache.       PHYSICAL EXAMINATION:  Vital Signs:  /60 (BP Location: Left arm, Patient Position: Sitting, Cuff Size: Adult)   Pulse 94   Temp (!) 101.3 °F (38.5 °C) (Temporal)   Ht 162.6 cm (64\")   Wt 61.9 kg (136 lb 6.4 oz)   LMP  (LMP Unknown)   SpO2 98%   BMI 23.41 kg/m²   Vitals:    02/07/20 1049   Patient Position: Sitting "       Physical Exam   Constitutional: She is oriented to person, place, and time. She appears well-developed and well-nourished. No distress.   HENT:   Head: Normocephalic and atraumatic.   Right Ear: External ear and ear canal normal. A middle ear effusion is present.   Left Ear: External ear and ear canal normal. A middle ear effusion is present.   Mouth/Throat: Oropharynx is clear and moist.   Neck: Normal range of motion. Neck supple. No JVD present.   Cardiovascular: Normal rate, regular rhythm, normal heart sounds and intact distal pulses.   Pulmonary/Chest: Effort normal and breath sounds normal. No respiratory distress.   Musculoskeletal: Normal range of motion. She exhibits no edema.   Neurological: She is alert and oriented to person, place, and time. No cranial nerve deficit.   Skin: Skin is warm and dry. Capillary refill takes less than 2 seconds. No rash noted.   Psychiatric: She has a normal mood and affect. Her behavior is normal.   Nursing note and vitals reviewed.      Procedures    ASSESSMENT/ PLAN:  Problem List Items Addressed This Visit     None      Visit Diagnoses     Acute non-recurrent maxillary sinusitis    -  Primary    Relevant Medications    cefTRIAXone (ROCEPHIN) injection 1 g (Completed)    methylPREDNISolone acetate (DEPO-medrol) injection 40 mg (Completed)    cefuroxime (CEFTIN) 500 MG tablet    fexofenadine-pseudoephedrine (ALLEGRA-D)  MG per 12 hr tablet    Bilateral chronic serous otitis media        Relevant Medications    cefTRIAXone (ROCEPHIN) injection 1 g (Completed)    methylPREDNISolone acetate (DEPO-medrol) injection 40 mg (Completed)    cefuroxime (CEFTIN) 500 MG tablet    fexofenadine-pseudoephedrine (ALLEGRA-D)  MG per 12 hr tablet    Non-recurrent acute serous otitis media of both ears        Relevant Medications    cefTRIAXone (ROCEPHIN) injection 1 g (Completed)    methylPREDNISolone acetate (DEPO-medrol) injection 40 mg (Completed)    cefuroxime (CEFTIN)  500 MG tablet    fexofenadine-pseudoephedrine (ALLEGRA-D)  MG per 12 hr tablet            Specific Patient Instructions:  MEDICATION Instructions: Encouraged patient to continue routine medicines as prescribed and maintain compliance. Patient instructed to report any adverse side effects or reactions to medicines promptly to the office. Patient instructed to make us aware of any OTC or herbal meds or supplement use.    DIET Recommendations: Patient instructed and provided information on the following nutrition and diet recommendations: Calorie restriction for weight reduction and maintenance. Necessity for adequate daily intake of fluids/water. Also, diet information provided in AVS for specifics.    EXERCISE Instructions: Discussed with patient the need for routine aerobic activity for cardiovascular fitness, 3 times a week for about 30 minutes. Daily exercise for increased fitness and weight reduction goals.    SMOKING Recommendations: Counseled patient and encouraged them on smoking and tobacco cessation if applicable. Discussed the benefits to all body systems with smoking/tobacco cessation, including decreased cardiac/lung/stroke/cancer risk. Encouraged no vaping use.    HEALTH MAINTENANCE:  Counseling provided to patient/family about routine health maintenance and ANNUAL physicals/labs. Counseling on recommended Vaccinations appropriate for age needed.        Patient's Body mass index is 23.41 kg/m². BMI is within normal parameters. No follow-up required..      Medications or Orders placed this visit:  No orders of the defined types were placed in this encounter.      Medications DISCONTINUED this visit:  Medications Discontinued During This Encounter   Medication Reason   • methylPREDNISolone (MEDROL, NORBERTO,) 4 MG tablet *Therapy completed       FOLLOW-UP:  Return if symptoms worsen or fail to improve, for Recheck.    I discussed the patients findings and my recommendations with patient.  An After Visit  Summary (AVS) was printed and given to the patient at discharge.      Damion Rey MD, FAAFP  2/11/2020

## 2020-02-27 ENCOUNTER — TELEPHONE (OUTPATIENT)
Dept: FAMILY MEDICINE CLINIC | Facility: CLINIC | Age: 55
End: 2020-02-27

## 2020-02-27 RX ORDER — FLUCONAZOLE 150 MG/1
150 TABLET ORAL ONCE
Qty: 1 TABLET | Refills: 0 | Status: SHIPPED | OUTPATIENT
Start: 2020-02-27 | End: 2020-02-27

## 2020-02-27 NOTE — TELEPHONE ENCOUNTER
Patient called in to request medication for a yeast infection be sent to pharmacy.     Patient states that she is having itching and is very uncomfortable, states that it is from taking an antibiotic that was recently prescribed     Pharmacy confirmed      Please Advise

## 2020-03-04 ENCOUNTER — TELEPHONE (OUTPATIENT)
Dept: FAMILY MEDICINE CLINIC | Facility: CLINIC | Age: 55
End: 2020-03-04

## 2020-03-04 DIAGNOSIS — J01.00 ACUTE NON-RECURRENT MAXILLARY SINUSITIS: ICD-10-CM

## 2020-03-04 DIAGNOSIS — H65.23 BILATERAL CHRONIC SEROUS OTITIS MEDIA: Primary | ICD-10-CM

## 2020-03-04 NOTE — TELEPHONE ENCOUNTER
Pts spouse called in stating that [employer] wouldn't cover the medication. Was told to contact physician to get authorization. Would like a return call.    Pt Contact: Spouse Rafiq [phone]    Pt Meds:  fexofenadine-pseudoephedrine (ALLEGRA-D)  MG per 12 hr tablet

## 2020-03-05 RX ORDER — LEVOCETIRIZINE DIHYDROCHLORIDE 5 MG/1
5 TABLET, FILM COATED ORAL EVERY EVENING
Qty: 30 TABLET | Refills: 0 | Status: SHIPPED | OUTPATIENT
Start: 2020-03-05 | End: 2021-03-05 | Stop reason: ALTCHOICE

## 2020-03-05 NOTE — TELEPHONE ENCOUNTER
This was rx'd by Dr. Rey.  The reason not covered = OTC medication.  Her insurance will not cover any of the meds in this class as they are all OTC.  Please let patient's  know.

## 2020-03-12 ENCOUNTER — OFFICE VISIT (OUTPATIENT)
Dept: FAMILY MEDICINE CLINIC | Facility: CLINIC | Age: 55
End: 2020-03-12

## 2020-03-12 VITALS
BODY MASS INDEX: 23.76 KG/M2 | SYSTOLIC BLOOD PRESSURE: 133 MMHG | OXYGEN SATURATION: 97 % | HEART RATE: 73 BPM | HEIGHT: 64 IN | WEIGHT: 139.2 LBS | DIASTOLIC BLOOD PRESSURE: 85 MMHG

## 2020-03-12 DIAGNOSIS — N30.00 ACUTE CYSTITIS WITHOUT HEMATURIA: ICD-10-CM

## 2020-03-12 DIAGNOSIS — H65.23 BILATERAL CHRONIC SEROUS OTITIS MEDIA: ICD-10-CM

## 2020-03-12 DIAGNOSIS — R30.0 DIFFICULT OR PAINFUL URINATION: Primary | ICD-10-CM

## 2020-03-12 LAB
BILIRUB BLD-MCNC: NEGATIVE MG/DL
CLARITY, POC: CLEAR
COLOR UR: YELLOW
GLUCOSE UR STRIP-MCNC: NEGATIVE MG/DL
KETONES UR QL: NEGATIVE
LEUKOCYTE EST, POC: ABNORMAL
NITRITE UR-MCNC: NEGATIVE MG/ML
PH UR: 7 [PH] (ref 5–8)
PROT UR STRIP-MCNC: NEGATIVE MG/DL
RBC # UR STRIP: ABNORMAL /UL
SP GR UR: 1.01 (ref 1–1.03)
UROBILINOGEN UR QL: NORMAL

## 2020-03-12 PROCEDURE — 81003 URINALYSIS AUTO W/O SCOPE: CPT | Performed by: NURSE PRACTITIONER

## 2020-03-12 PROCEDURE — 99213 OFFICE O/P EST LOW 20 MIN: CPT | Performed by: NURSE PRACTITIONER

## 2020-03-12 RX ORDER — AZELASTINE 1 MG/ML
2 SPRAY, METERED NASAL 2 TIMES DAILY
Qty: 1 EACH | Refills: 2 | Status: SHIPPED | OUTPATIENT
Start: 2020-03-12 | End: 2020-05-20 | Stop reason: SDUPTHER

## 2020-03-12 RX ORDER — FLUCONAZOLE 150 MG/1
150 TABLET ORAL ONCE
Qty: 1 TABLET | Refills: 0 | Status: SHIPPED | OUTPATIENT
Start: 2020-03-12 | End: 2020-03-12

## 2020-03-12 RX ORDER — SULFAMETHOXAZOLE AND TRIMETHOPRIM 800; 160 MG/1; MG/1
1 TABLET ORAL 2 TIMES DAILY
Qty: 10 TABLET | Refills: 0 | Status: SHIPPED | OUTPATIENT
Start: 2020-03-12 | End: 2020-03-17

## 2020-03-12 NOTE — PROGRESS NOTES
Chief Complaint   Patient presents with   • Difficulty Urinating        Subjective   Hanna Luciano is a 54 y.o.  female who presents today for difficulty urinating.    HPI:  Use of  for American Sign Language Hanna ([id number]) for transcription of HPI.  DIFFICULTY URINATING:  Burning with urination. Patient feels this is related to yeast infection although taking a Diflucan and Monistat did not relieve her symptoms.  UNABLE TO WEAR HEARING AIDS DUE TO EAR PAIN:  Patient reports this has been ongoing for almost a year.  There is pain in her ears when placing her hearing aids and pain in her ears in general.      Hanna Luciano  has a past medical history of Arthritis, Depression, Generalized headaches, GERD (gastroesophageal reflux disease), and Hyperlipidemia.    Allergies   Allergen Reactions   • Atorvastatin Unknown (See Comments)   • Baclofen Unknown (See Comments)   • Diphenhydramine Unknown (See Comments)   • Lorazepam Unknown (See Comments)   • Morphine Unknown (See Comments)   • Penicillins Unknown (See Comments)   • Soma  [Carisoprodol] Unknown (See Comments)   • Valproic Acid Unknown (See Comments)       Current Outpatient Medications:   •  buPROPion XL (WELLBUTRIN XL) 300 MG 24 hr tablet, TAKE ONE TABLET BY MOUTH EVERY DAY, Disp: 30 tablet, Rfl: 5  •  Calcium-Magnesium-Vitamin D (CITRACAL CALCIUM+D PO), Take  by mouth., Disp: , Rfl:   •  diclofenac (VOLTAREN) 1 % gel gel, Voltaren 1 % topical gel, Disp: , Rfl:   •  fexofenadine-pseudoephedrine (ALLEGRA-D)  MG per 12 hr tablet, Take 1 tablet by mouth 2 (Two) Times a Day As Needed for Allergies (sinus congestion)., Disp: 30 tablet, Rfl: 2  •  fluticasone (FLONASE) 50 MCG/ACT nasal spray, 2 sprays into the nostril(s) as directed by provider Daily., Disp: , Rfl:   •  levocetirizine (XYZAL) 5 MG tablet, Take 1 tablet by mouth Every Evening., Disp: 30 tablet, Rfl: 0  •  methocarbamol (ROBAXIN) 750 MG tablet, Take 750 mg by mouth 4  (Four) Times a Day As Needed for Muscle Spasms., Disp: , Rfl:   •  pantoprazole (PROTONIX) 40 MG EC tablet, pantoprazole 40 mg tablet,delayed release, Disp: , Rfl:   •  temazepam (RESTORIL) 30 MG capsule, Take 1 capsule by mouth Daily., Disp: , Rfl:   •  Turmeric Curcumin 500 MG capsule, Take  by mouth., Disp: , Rfl:   •  azelastine (ASTELIN) 0.1 % nasal spray, 2 sprays into the nostril(s) as directed by provider 2 (Two) Times a Day. Use in each nostril as directed, Disp: 1 each, Rfl: 2  •  fluconazole (DIFLUCAN) 150 MG tablet, Take 1 tablet by mouth 1 (One) Time for 1 dose., Disp: 1 tablet, Rfl: 0  •  sulfamethoxazole-trimethoprim (BACTRIM DS,SEPTRA DS) 800-160 MG per tablet, Take 1 tablet by mouth 2 (Two) Times a Day for 5 days., Disp: 10 tablet, Rfl: 0  Past Medical History:   Diagnosis Date   • Arthritis    • Depression    • Generalized headaches    • GERD (gastroesophageal reflux disease)    • Hyperlipidemia      Past Surgical History:   Procedure Laterality Date   • APPENDECTOMY     • BACK SURGERY  2003   • EYE SURGERY Left 1965    left eyebrow   • OOPHORECTOMY Left 2000     Social History     Socioeconomic History   • Marital status: Single     Spouse name: Not on file   • Number of children: Not on file   • Years of education: Not on file   • Highest education level: Not on file   Tobacco Use   • Smoking status: Former Smoker     Types: Cigarettes, Electronic Cigarette   • Smokeless tobacco: Never Used   Substance and Sexual Activity   • Alcohol use: No     Frequency: Never   • Drug use: Yes     Types: Marijuana   • Sexual activity: Defer     Family History   Problem Relation Age of Onset   • Hyperlipidemia Other    • Heart disease Other    • Hypertension Other    • Diabetes Other    • Cancer Other    • Arthritis Other    • Mental illness Other    • No Known Problems Mother    • No Known Problems Father        Family history, surgical history, past medical history, Allergies and med's reviewed with patient  "today and updated in [facility] EMR.     ROS:  Review of Systems   Constitutional: Negative.  Negative for fatigue, fever and unexpected weight change.   HENT: Positive for ear pain. Negative for facial swelling, sore throat and trouble swallowing.    Eyes: Negative.  Negative for photophobia, discharge and visual disturbance.   Respiratory: Negative.  Negative for cough, chest tightness and shortness of breath.    Cardiovascular: Negative.  Negative for chest pain and palpitations.   Gastrointestinal: Negative.  Negative for abdominal pain, diarrhea, nausea and vomiting.   Endocrine: Negative.  Negative for polydipsia, polyphagia and polyuria.   Genitourinary: Positive for dysuria, frequency and urgency. Negative for flank pain.   Musculoskeletal: Negative.  Negative for back pain, gait problem and neck pain.   Skin: Negative.  Negative for rash.   Allergic/Immunologic: Negative.    Neurological: Negative.  Negative for dizziness, light-headedness and headaches.   Hematological: Negative.    Psychiatric/Behavioral: Negative.  Negative for self-injury and suicidal ideas.       OBJECTIVE:  Vitals:    03/12/20 1627   BP: 133/85   BP Location: Right arm   Patient Position: Sitting   Cuff Size: Adult   Pulse: 73   SpO2: 97%   Weight: 63.1 kg (139 lb 3.2 oz)   Height: 162.6 cm (64\")     Physical Exam   Constitutional: She is oriented to person, place, and time. She appears well-developed and well-nourished. No distress.   HENT:   Head: Normocephalic and atraumatic.   Right Ear: External ear normal.   Left Ear: External ear normal.   Nose: Nose normal.   Mouth/Throat: Oropharynx is clear and moist.   Bilateral serous effusions, middle ear, significant and bulging.   Eyes: Pupils are equal, round, and reactive to light. Conjunctivae and EOM are normal.   Neck: Normal range of motion. Neck supple.   Cardiovascular: Normal rate, regular rhythm, normal heart sounds and intact distal pulses.   No murmur heard.  Pulmonary/Chest: " Effort normal and breath sounds normal. No respiratory distress.   Abdominal: Soft. Bowel sounds are normal. She exhibits no distension. There is no tenderness.   Musculoskeletal: Normal range of motion. She exhibits no edema.   Neurological: She is alert and oriented to person, place, and time.   Skin: Skin is warm and dry. Capillary refill takes less than 2 seconds. She is not diaphoretic. No erythema.   Psychiatric: She has a normal mood and affect. Her behavior is normal. Judgment and thought content normal.   Nursing note and vitals reviewed.      ASSESSMENT/ PLAN:    Hanna was seen today for difficulty urinating.    Diagnoses and all orders for this visit:    Difficult or painful urination  -     POCT urinalysis dipstick, multipro  -     sulfamethoxazole-trimethoprim (BACTRIM DS,SEPTRA DS) 800-160 MG per tablet; Take 1 tablet by mouth 2 (Two) Times a Day for 5 days.    Acute cystitis without hematuria  -     sulfamethoxazole-trimethoprim (BACTRIM DS,SEPTRA DS) 800-160 MG per tablet; Take 1 tablet by mouth 2 (Two) Times a Day for 5 days.    Bilateral chronic serous otitis media  -     azelastine (ASTELIN) 0.1 % nasal spray; 2 sprays into the nostril(s) as directed by provider 2 (Two) Times a Day. Use in each nostril as directed  -     Ambulatory Referral to ENT (Otolaryngology)    Other orders  -     fluconazole (DIFLUCAN) 150 MG tablet; Take 1 tablet by mouth 1 (One) Time for 1 dose.        Orders Placed Today:     New Medications Ordered This Visit   Medications   • azelastine (ASTELIN) 0.1 % nasal spray     Si sprays into the nostril(s) as directed by provider 2 (Two) Times a Day. Use in each nostril as directed     Dispense:  1 each     Refill:  2   • fluconazole (DIFLUCAN) 150 MG tablet     Sig: Take 1 tablet by mouth 1 (One) Time for 1 dose.     Dispense:  1 tablet     Refill:  0   • sulfamethoxazole-trimethoprim (BACTRIM DS,SEPTRA DS) 800-160 MG per tablet     Sig: Take 1 tablet by mouth 2 (Two) Times  a Day for 5 days.     Dispense:  10 tablet     Refill:  0        Management Plan:     An After Visit Summary was printed and given to the patient at discharge.    Follow-up: Return if symptoms worsen or fail to improve.    Isa Vora, APRN 3/12/2020 17:05  This note was electronically signed.

## 2020-03-19 ENCOUNTER — TELEPHONE (OUTPATIENT)
Dept: FAMILY MEDICINE CLINIC | Facility: CLINIC | Age: 55
End: 2020-03-19

## 2020-03-19 DIAGNOSIS — R30.0 DIFFICULT OR PAINFUL URINATION: Primary | ICD-10-CM

## 2020-03-19 DIAGNOSIS — B37.31 VAGINAL CANDIDIASIS: ICD-10-CM

## 2020-03-19 NOTE — TELEPHONE ENCOUNTER
Has concerns about UTI. Received med's for uti and yeast injections. Has completed those and now is having a lot of pain. Is having a very mucus/slimey discharge and is requesting a call back to discuss concerns.    *FYI* Patient is using an  so may take a little bit longer to answer when contacted.

## 2020-03-19 NOTE — TELEPHONE ENCOUNTER
1)  Did urinary symptoms improve while patient was on antibiotics?   If so, perhaps the infection was not treated long enough or with the most appropriate antibiotic.    2)  Did the symptoms not improve at all during treatment?   If so, we should treat with a different antibiotic.    3)  Did her ear pain improve with the treatment given last week?   Either way, she has been referred to ENT for her chronic problems.    4)  Please remind her that we are limiting visits due to the pandemic and will make every effort to get her relief of her problem without needing an office visit.

## 2020-03-20 RX ORDER — FLUCONAZOLE 150 MG/1
150 TABLET ORAL ONCE
Qty: 1 TABLET | Refills: 0 | Status: SHIPPED | OUTPATIENT
Start: 2020-03-20 | End: 2020-03-20

## 2020-03-20 RX ORDER — SULFAMETHOXAZOLE AND TRIMETHOPRIM 800; 160 MG/1; MG/1
1 TABLET ORAL 2 TIMES DAILY
Qty: 10 TABLET | Refills: 0 | Status: SHIPPED | OUTPATIENT
Start: 2020-03-20 | End: 2020-03-25

## 2020-03-20 NOTE — TELEPHONE ENCOUNTER
1. Symptoms did improve after taking abx  - may need another round as suggested.  2. Ear pain did not improve but will continue to take meds and will wait for ENT referral once restrictions are lifted.   3. Pt is aware of the limited visits and thanks you for taking time to help her.   4. Pt is requesting Diflucan to take after abx due to frequent yeast infections with abx.

## 2020-03-23 NOTE — TELEPHONE ENCOUNTER
"Patient called in this morning and stated the over the weekend she thinks she had an allergic reaction to the antibiotic that was called in.  Stated she started itching, lips, tongue, and hands became swollen.  Patient state that she stopped antibiotic and did not take on Sunday.  Patient stated she feels as though her yeast infection has improved however the skin around the area is now burning. Patient stated that Mrs. Moss has given her something for this before.  Patient started medication name was \"silver something or similar to this\"..  Patient stated she is continuing to take the Allegra D and the nasal spray and wants to know if she should continue these.  Please advise?  "

## 2020-04-07 ENCOUNTER — TELEPHONE (OUTPATIENT)
Dept: FAMILY MEDICINE CLINIC | Facility: CLINIC | Age: 55
End: 2020-04-07

## 2020-04-07 NOTE — TELEPHONE ENCOUNTER
PT CALLED TO REQUEST A CALL BACK TO DISCUSS CHRONIC SINUS INFECTION SYMPTOMS. PT NEEDS . PLEASE ADVISE.

## 2020-04-07 NOTE — TELEPHONE ENCOUNTER
I honestly am at a loss of what else we can do for patient in primary care.  I still feel that the best course of action is a visit with ENT.  She is more than welcome to follow up with another provider in this office if she would like another opinion.

## 2020-04-07 NOTE — TELEPHONE ENCOUNTER
Called and spoke with Patient. She states she has had a chronic sinus infection that she has been taking ABX for 2 months now. Taking ABX has given her a yeast infection and UTI. Patient complaints of severe constipation and she went to [facility] and they were awful and didn't help. She also complaints of ear pain and fullness. Pt has taken Allegra D, Flonase nasal spray with many other OTC medications and nothing helps. Patient refuses to take anymore antibiotics or go see an ENT because all they want to do is talk about her deafness. Please advise.

## 2020-05-20 DIAGNOSIS — H65.23 BILATERAL CHRONIC SEROUS OTITIS MEDIA: ICD-10-CM

## 2020-05-20 RX ORDER — AZELASTINE 1 MG/ML
2 SPRAY, METERED NASAL 2 TIMES DAILY
Qty: 1 EACH | Refills: 2 | Status: SHIPPED | OUTPATIENT
Start: 2020-05-20 | End: 2020-06-30 | Stop reason: SDUPTHER

## 2020-05-20 NOTE — TELEPHONE ENCOUNTER
Patient called in requesting a refill on azelastine (ASTELIN) 0.1 % nasal spray. She stated that it did not have any refills.     Pharmacy confirmed.

## 2020-06-08 ENCOUNTER — TELEPHONE (OUTPATIENT)
Dept: FAMILY MEDICINE CLINIC | Facility: CLINIC | Age: 55
End: 2020-06-08

## 2020-06-08 DIAGNOSIS — M51.37 DEGENERATION OF LUMBOSACRAL INTERVERTEBRAL DISC: Primary | ICD-10-CM

## 2020-06-08 NOTE — TELEPHONE ENCOUNTER
Patient called and wanted to request to have Rx refilled. Please fill following med for patient:    diclofenac (VOLTAREN) 1 % gel gel    [facility] confirmed     Please contact patient once called or if any issues in @ [phone]

## 2020-06-16 ENCOUNTER — TELEPHONE (OUTPATIENT)
Dept: FAMILY MEDICINE CLINIC | Facility: CLINIC | Age: 55
End: 2020-06-16

## 2020-06-16 NOTE — TELEPHONE ENCOUNTER
Patient called in with an .     Advised that she needs to remove Rafiq Jones immediately from having access to her records.     Advised that she will need to sign a new BH verbal at next appointment.     Please assist in removing Rafiq Jones and do not provide any information.

## 2020-06-23 RX ORDER — BUPROPION HYDROCHLORIDE 300 MG/1
300 TABLET ORAL DAILY
Qty: 30 TABLET | Refills: 5 | Status: SHIPPED | OUTPATIENT
Start: 2020-06-23 | End: 2021-01-11

## 2020-06-23 NOTE — TELEPHONE ENCOUNTER
PT called, with an  assisting via relay, to request a refill on the following medications:  · buPROPion XL (WELLBUTRIN XL) 300 MG 24 hr tablet  · Calcium-Magnesium-Vitamin D (CITRACAL CALCIUM+D PO)    Unable to pend PT's calcium, but she states she's been out of both medications for the past two weeks.     Confirmed Pharmacy:  [facility]/pharmacy #[id number] - [place], [place] - [address] [place] [address] - [phone]  - [phone] [address] [address]  Phone: [phone] Fax: [phone]

## 2020-06-29 ENCOUNTER — TELEPHONE (OUTPATIENT)
Dept: FAMILY MEDICINE CLINIC | Facility: CLINIC | Age: 55
End: 2020-06-29

## 2020-06-29 NOTE — TELEPHONE ENCOUNTER
Dr. Beth is asking that we assume patient's gabapentin prescription that she has been on for the past 2+ years.  She currently takes 800 mg TID.  He has refilled today x 3 months.  She sees me in August and we need to obtain a UDS and Controlled Substance Agreement at that encounter.

## 2020-06-29 NOTE — TELEPHONE ENCOUNTER
PT CALLED AND STATED THAT DR. BARBER IS REQUESTING A CALL FROM NILDA KOCH. PLEASE CALL DR. BARBER [phone]

## 2020-06-30 RX ORDER — AZELASTINE HCL 205.5 UG/1
SPRAY NASAL
Qty: 30 ML | Refills: 5 | Status: SHIPPED | OUTPATIENT
Start: 2020-06-30 | End: 2021-03-05

## 2020-07-16 ENCOUNTER — TELEPHONE (OUTPATIENT)
Dept: FAMILY MEDICINE CLINIC | Facility: CLINIC | Age: 55
End: 2020-07-16

## 2020-07-16 DIAGNOSIS — M51.37 DEGENERATION OF LUMBOSACRAL INTERVERTEBRAL DISC: Primary | ICD-10-CM

## 2020-07-16 RX ORDER — METHYLPREDNISOLONE 4 MG/1
TABLET ORAL
Qty: 1 EACH | Refills: 0 | Status: SHIPPED | OUTPATIENT
Start: 2020-07-16 | End: 2020-09-30

## 2020-07-16 NOTE — TELEPHONE ENCOUNTER
"Caller: Hanna Luciano    Relationship: Self    Best call back number: [phone]    What medication are you requesting: \"prednisolone\" dosage unknown    What are your current symptoms: spine hurts when walking due to previous back surgery; states it feels like someone is pulling on it.    How long have you been experiencing symptoms: a while     Have you had these symptoms before:    [x] Yes  [] No    Have you been treated for these symptoms before:   [x] Yes  [] No    If a prescription is needed, what is your preferred pharmacy and phone number: [facility]/pharmacy #[id number] - [address] [phone] [facility] [phone] [facility]        "

## 2020-08-03 RX ORDER — CHOLECALCIFEROL (VITAMIN D3) 25 MCG
CAPSULE ORAL
Qty: 30 CAPSULE | Refills: 5 | Status: SHIPPED | OUTPATIENT
Start: 2020-08-03 | End: 2021-02-25 | Stop reason: SINTOL

## 2020-09-03 ENCOUNTER — TELEMEDICINE (OUTPATIENT)
Dept: FAMILY MEDICINE CLINIC | Facility: CLINIC | Age: 55
End: 2020-09-03

## 2020-09-03 DIAGNOSIS — M96.1 LUMBAR POST-LAMINECTOMY SYNDROME: ICD-10-CM

## 2020-09-03 DIAGNOSIS — G89.29 CHRONIC MIDLINE LOW BACK PAIN WITH SCIATICA, SCIATICA LATERALITY UNSPECIFIED: Primary | ICD-10-CM

## 2020-09-03 DIAGNOSIS — M51.37 DEGENERATION OF LUMBOSACRAL INTERVERTEBRAL DISC: ICD-10-CM

## 2020-09-03 DIAGNOSIS — M54.40 CHRONIC MIDLINE LOW BACK PAIN WITH SCIATICA, SCIATICA LATERALITY UNSPECIFIED: Primary | ICD-10-CM

## 2020-09-03 DIAGNOSIS — M25.551 BILATERAL HIP PAIN: ICD-10-CM

## 2020-09-03 DIAGNOSIS — M25.552 BILATERAL HIP PAIN: ICD-10-CM

## 2020-09-03 PROCEDURE — 99442 PR PHYS/QHP TELEPHONE EVALUATION 11-20 MIN: CPT | Performed by: NURSE PRACTITIONER

## 2020-09-03 NOTE — PROGRESS NOTES
Chief Complaint   Patient presents with   • Back Pain        Subjective   Hanna Luciano is a 54 y.o.  female who presents today for back pain.    You have chosen to receive care through a telephone visit. Do you consent to use a telephone visit for your medical care today? Yes    HPI:  History of back surgery in 2003 and has hip pain as well.  Neurologist has recommended a referral to orthopedic surgeon and they are arranging that appointment for her.  She is interested in obtaining some type of lift chair to help with her multiple areas of pain.    Hanna Luciano  has a past medical history of Arthritis, Depression, Generalized headaches, GERD (gastroesophageal reflux disease), and Hyperlipidemia.    Allergies   Allergen Reactions   • Atorvastatin Unknown (See Comments)   • Baclofen Unknown (See Comments)   • Diphenhydramine Unknown (See Comments)   • Lorazepam Unknown (See Comments)   • Morphine Unknown (See Comments)   • Penicillins Unknown (See Comments)   • Soma  [Carisoprodol] Unknown (See Comments)   • Valproic Acid Unknown (See Comments)       Current Outpatient Medications:   •  azelastine (ASTEPRO) 0.15 % solution nasal spray, USE 2 SPRAYS INTO EACH NOSTRIL 2 TIMES DAILY, Disp: 30 mL, Rfl: 5  •  buPROPion XL (WELLBUTRIN XL) 300 MG 24 hr tablet, Take 1 tablet by mouth Daily., Disp: 30 tablet, Rfl: 5  •  Calcium-Magnesium-Vitamin D (CITRACAL CALCIUM+D PO), Take 300 mg by mouth., Disp: , Rfl:   •  CVS D3 25 MCG (1000 UT) capsule, TAKE 1 CAPSULE BY MOUTH EVERY DAY, Disp: 30 capsule, Rfl: 5  •  diclofenac (Voltaren) 1 % gel gel, Apply  topically to the appropriate area as directed 4 (Four) Times a Day., Disp: 100 g, Rfl: 5  •  fexofenadine-pseudoephedrine (ALLEGRA-D)  MG per 12 hr tablet, Take 1 tablet by mouth 2 (Two) Times a Day As Needed for Allergies (sinus congestion)., Disp: 30 tablet, Rfl: 2  •  fluticasone (FLONASE) 50 MCG/ACT nasal spray, 2 sprays into the nostril(s) as directed by  provider Daily., Disp: , Rfl:   •  levocetirizine (XYZAL) 5 MG tablet, Take 1 tablet by mouth Every Evening., Disp: 30 tablet, Rfl: 0  •  methocarbamol (ROBAXIN) 750 MG tablet, Take 750 mg by mouth 4 (Four) Times a Day As Needed for Muscle Spasms., Disp: , Rfl:   •  methylPREDNISolone (MEDROL, NORBERTO,) 4 MG tablet, Take as directed on package instructions., Disp: 1 each, Rfl: 0  •  pantoprazole (PROTONIX) 40 MG EC tablet, pantoprazole 40 mg tablet,delayed release, Disp: , Rfl:   •  silver sulfadiazine (SILVADENE, SSD) 1 % cream, Apply  topically to the appropriate area as directed 2 (Two) Times a Day., Disp: 1 each, Rfl: 2  •  temazepam (RESTORIL) 30 MG capsule, Take 1 capsule by mouth Daily., Disp: , Rfl:   •  Turmeric Curcumin 500 MG capsule, Take  by mouth., Disp: , Rfl:   Past Medical History:   Diagnosis Date   • Arthritis    • Depression    • Generalized headaches    • GERD (gastroesophageal reflux disease)    • Hyperlipidemia      Past Surgical History:   Procedure Laterality Date   • APPENDECTOMY     • BACK SURGERY  2003   • EYE SURGERY Left 1965    left eyebrow   • OOPHORECTOMY Left 2000     Social History     Socioeconomic History   • Marital status: Single     Spouse name: Not on file   • Number of children: Not on file   • Years of education: Not on file   • Highest education level: Not on file   Tobacco Use   • Smoking status: Former Smoker     Types: Cigarettes, Electronic Cigarette   • Smokeless tobacco: Never Used   Substance and Sexual Activity   • Alcohol use: No     Frequency: Never   • Drug use: Yes     Types: Marijuana   • Sexual activity: Defer     Family History   Problem Relation Age of Onset   • Hyperlipidemia Other    • Heart disease Other    • Hypertension Other    • Diabetes Other    • Cancer Other    • Arthritis Other    • Mental illness Other    • No Known Problems Mother    • No Known Problems Father        Family history, surgical history, past medical history, Allergies and med's  reviewed with patient today and updated in [facility] EMR.     ROS:  Review of Systems   Constitutional: Negative for fatigue, fever and unexpected weight change.   HENT: Positive for hearing loss. Negative for facial swelling, sore throat and trouble swallowing.    Eyes: Negative.  Negative for photophobia, discharge and visual disturbance.   Respiratory: Negative.  Negative for cough, chest tightness and shortness of breath.    Cardiovascular: Negative.  Negative for chest pain and palpitations.   Gastrointestinal: Negative.  Negative for abdominal pain, diarrhea, nausea and vomiting.   Endocrine: Negative.  Negative for polydipsia, polyphagia and polyuria.   Genitourinary: Negative.  Negative for dysuria, flank pain and frequency.   Musculoskeletal: Positive for arthralgias and back pain. Negative for gait problem and neck pain.   Skin: Negative.  Negative for rash.   Allergic/Immunologic: Negative.    Neurological: Positive for weakness. Negative for dizziness, light-headedness and headaches.   Hematological: Negative.    Psychiatric/Behavioral: Negative.  Negative for self-injury and suicidal ideas.       OBJECTIVE:  There were no vitals filed for this visit.  Physical Exam  No physical exam completed on this telephonic visit.    ASSESSMENT/ PLAN:    Hanna was seen today for back pain.    Diagnoses and all orders for this visit:    Chronic midline low back pain with sciatica, sciatica laterality unspecified    Lumbar post-laminectomy syndrome    Degeneration of lumbosacral intervertebral disc    Bilateral hip pain    Patient understands that until she is further evaluated, no DME can be authorized.  She will follow up with ortho as directed by neurologist.    Orders Placed Today:     No orders of the defined types were placed in this encounter.       Management Plan:     An After Visit Summary was printed and given to the patient at discharge.    Follow-up: Return in about 3 months (around 12/3/2020) for Medicare  Wellness with labs prior.     Total patient time was 13 minutes.    sIa Vora, APRN 9/3/2020 12:07  This note was electronically signed.

## 2020-09-17 ENCOUNTER — TELEPHONE (OUTPATIENT)
Dept: FAMILY MEDICINE CLINIC | Facility: CLINIC | Age: 55
End: 2020-09-17

## 2020-09-17 DIAGNOSIS — M51.37 DEGENERATION OF LUMBOSACRAL INTERVERTEBRAL DISC: Primary | ICD-10-CM

## 2020-09-17 NOTE — TELEPHONE ENCOUNTER
[facility] called and stated that pt is wanting to do a refill with them on [place] but they do not have a script for it.    Please send if script to [facility] if pt still needs it.    Thanks

## 2020-09-21 ENCOUNTER — TELEPHONE (OUTPATIENT)
Dept: FAMILY MEDICINE CLINIC | Facility: CLINIC | Age: 55
End: 2020-09-21

## 2020-09-21 NOTE — TELEPHONE ENCOUNTER
Pt states she was told by her neurologist Dr Denny that she needed to follow up with PCP to refill scripts that were given by Dr. Denny.  She is good on medication until November and has made an appt to f/u with Dr. Rey as requested.    She would like to know if she needs labs before she comes in to see Dr. Rey or just wait until after her appt.       Thanks.

## 2020-09-22 ENCOUNTER — RESULTS ENCOUNTER (OUTPATIENT)
Dept: FAMILY MEDICINE CLINIC | Facility: CLINIC | Age: 55
End: 2020-09-22

## 2020-09-22 ENCOUNTER — TELEPHONE (OUTPATIENT)
Dept: FAMILY MEDICINE CLINIC | Facility: CLINIC | Age: 55
End: 2020-09-22

## 2020-09-22 DIAGNOSIS — Z00.00 MEDICARE ANNUAL WELLNESS VISIT, SUBSEQUENT: ICD-10-CM

## 2020-09-22 DIAGNOSIS — Z13.220 LIPID SCREENING: ICD-10-CM

## 2020-09-22 DIAGNOSIS — R53.83 FATIGUE, UNSPECIFIED TYPE: ICD-10-CM

## 2020-09-22 DIAGNOSIS — F41.9 ANXIETY: ICD-10-CM

## 2020-09-22 DIAGNOSIS — R53.83 FATIGUE, UNSPECIFIED TYPE: Primary | ICD-10-CM

## 2020-09-22 DIAGNOSIS — Z11.59 ENCOUNTER FOR HEPATITIS C SCREENING TEST FOR LOW RISK PATIENT: ICD-10-CM

## 2020-09-22 DIAGNOSIS — K21.9 GASTROESOPHAGEAL REFLUX DISEASE, ESOPHAGITIS PRESENCE NOT SPECIFIED: ICD-10-CM

## 2020-09-22 DIAGNOSIS — G47.00 INSOMNIA, UNSPECIFIED TYPE: ICD-10-CM

## 2020-09-22 NOTE — TELEPHONE ENCOUNTER
Patient is going to be switching care from Dr. Rey to you. Patient is due for physical and is wanting labs this week prior to the appointment next week. Patient is wanting to get these at [facility] if we can get orders faxed. Patient will be walking to [facility] because she no longer has a car.     Patient will require . I will bring IPAD from [place] for patients appt.

## 2020-09-22 NOTE — TELEPHONE ENCOUNTER
Patient is going to be switching care to Karyn Claros. Patient no longer has a vehicle and needs somewhere she can walk to be seen.

## 2020-09-23 ENCOUNTER — TELEPHONE (OUTPATIENT)
Dept: FAMILY MEDICINE CLINIC | Facility: CLINIC | Age: 55
End: 2020-09-23

## 2020-09-23 DIAGNOSIS — K21.9 GASTROESOPHAGEAL REFLUX DISEASE, ESOPHAGITIS PRESENCE NOT SPECIFIED: Primary | ICD-10-CM

## 2020-09-23 NOTE — TELEPHONE ENCOUNTER
PATIENT STATES SHE HAS A RUNNY NOSE,AND BURNING IN HER CHEST, AND PRESSURE IN HER EARS SHE IS REQUESTING A CALL BACK TO BE ADVISED ABOUT HER SYMPTOMS OR TO SEE IF YOU COULD SEND SOMETHING IN TO THE PHARMACY      GOOD CONTACT NUMBER   [phone] (M)

## 2020-09-28 RX ORDER — LANSOPRAZOLE 30 MG/1
30 CAPSULE, DELAYED RELEASE ORAL DAILY
Qty: 30 CAPSULE | Refills: 0 | Status: SHIPPED | OUTPATIENT
Start: 2020-09-28 | End: 2021-02-26 | Stop reason: SDUPTHER

## 2020-09-28 NOTE — TELEPHONE ENCOUNTER
This patient is switching providers to Dr Olguin's office. She states she is doing better. Patient is requesting refill on lansoprazole 30mg QD to [facility]. Pt has appt with Karyn 9/30/20.

## 2020-09-30 ENCOUNTER — OFFICE VISIT (OUTPATIENT)
Dept: FAMILY MEDICINE CLINIC | Facility: CLINIC | Age: 55
End: 2020-09-30

## 2020-09-30 VITALS
OXYGEN SATURATION: 98 % | BODY MASS INDEX: 25.03 KG/M2 | HEART RATE: 72 BPM | WEIGHT: 146.6 LBS | HEIGHT: 64 IN | TEMPERATURE: 97.3 F | SYSTOLIC BLOOD PRESSURE: 129 MMHG | DIASTOLIC BLOOD PRESSURE: 79 MMHG

## 2020-09-30 DIAGNOSIS — Z00.00 ANNUAL PHYSICAL EXAM: ICD-10-CM

## 2020-09-30 DIAGNOSIS — Z23 ENCOUNTER FOR IMMUNIZATION: ICD-10-CM

## 2020-09-30 DIAGNOSIS — M96.1 LUMBAR POST-LAMINECTOMY SYNDROME: ICD-10-CM

## 2020-09-30 DIAGNOSIS — Z03.89 ENCOUNTER FOR OBSERVATION FOR OTHER SUSPECTED DISEASES AND CONDITIONS RULED OUT: ICD-10-CM

## 2020-09-30 DIAGNOSIS — E78.2 MIXED HYPERLIPIDEMIA: ICD-10-CM

## 2020-09-30 DIAGNOSIS — F51.01 PRIMARY INSOMNIA: ICD-10-CM

## 2020-09-30 DIAGNOSIS — Z12.39 BREAST CANCER SCREENING: ICD-10-CM

## 2020-09-30 DIAGNOSIS — Z00.00 MEDICARE ANNUAL WELLNESS VISIT, SUBSEQUENT: Primary | ICD-10-CM

## 2020-09-30 DIAGNOSIS — K21.9 GASTROESOPHAGEAL REFLUX DISEASE, ESOPHAGITIS PRESENCE NOT SPECIFIED: ICD-10-CM

## 2020-09-30 DIAGNOSIS — Z12.11 COLON CANCER SCREENING: ICD-10-CM

## 2020-09-30 PROCEDURE — 90686 IIV4 VACC NO PRSV 0.5 ML IM: CPT | Performed by: NURSE PRACTITIONER

## 2020-09-30 PROCEDURE — 96160 PT-FOCUSED HLTH RISK ASSMT: CPT | Performed by: NURSE PRACTITIONER

## 2020-09-30 PROCEDURE — G0008 ADMIN INFLUENZA VIRUS VAC: HCPCS | Performed by: NURSE PRACTITIONER

## 2020-09-30 PROCEDURE — G0439 PPPS, SUBSEQ VISIT: HCPCS | Performed by: NURSE PRACTITIONER

## 2020-09-30 RX ORDER — GABAPENTIN 800 MG/1
800 TABLET ORAL 3 TIMES DAILY
COMMUNITY
Start: 2020-09-09 | End: 2020-10-06 | Stop reason: SDUPTHER

## 2020-09-30 RX ORDER — FAMOTIDINE 20 MG/1
20 TABLET, FILM COATED ORAL 2 TIMES DAILY
Qty: 60 TABLET | Refills: 2 | Status: SHIPPED | OUTPATIENT
Start: 2020-09-30 | End: 2021-01-28

## 2020-09-30 NOTE — TELEPHONE ENCOUNTER
Please contact patient and see what her allergic reaction to atorvastatin was.  I was going to order cholesterol medication, but noticed this allergy.  If it was muscle aches, is she willing to try a different medication?

## 2020-09-30 NOTE — PROGRESS NOTES
The ABCs of the Annual Wellness Visit  Subsequent Medicare Wellness Visit    Chief Complaint   Patient presents with   • Medicare Wellness-subsequent     non fasting       Subjective   History of Present Illness:  Hanna Luciano is a 54 y.o. female who presents for a Subsequent Medicare Wellness Visit.    HEALTH RISK ASSESSMENT    Recent Hospitalizations:  No hospitalization(s) within the last year.    Current Medical Providers:  Patient Care Team:  Isa Vora APRN as PCP - General (Family Medicine)  April Tolliver APRN as Nurse Practitioner (Otolaryngology)  Harpreet Rene MD as Consulting Physician (Otolaryngology)  Isa Vora APRN as Referring Physician (Family Medicine)    Smoking Status:  Social History     Tobacco Use   Smoking Status Former Smoker   • Types: Cigarettes, Electronic Cigarette   Smokeless Tobacco Never Used       Alcohol Consumption:  Social History     Substance and Sexual Activity   Alcohol Use No   • Frequency: Never       Depression Screen:   PHQ-2/PHQ-9 Depression Screening 9/30/2020   Little interest or pleasure in doing things 0   Feeling down, depressed, or hopeless 0   Total Score 0       Fall Risk Screen:  STEADI Fall Risk Assessment has not been completed.    Health Habits and Functional and Cognitive Screening:  Functional & Cognitive Status 9/30/2020   Do you have difficulty preparing food and eating? No   Do you have difficulty bathing yourself, getting dressed or grooming yourself? No   Do you have difficulty using the toilet? No   Do you have difficulty moving around from place to place? No   Do you have trouble with steps or getting out of a bed or a chair? Yes   Current Diet Unhealthy Diet   Dental Exam Not up to date   Eye Exam Up to date   Exercise (times per week) 2 times per week   Current Exercise Activities Include Yoga   Do you need help using the phone?  Yes   Are you deaf or do you have serious difficulty hearing?  Yes   Do you need help  with transportation? No   Do you need help shopping? No   Do you need help preparing meals?  No   Do you need help with housework?  No   Do you need help with laundry? No   Do you need help taking your medications? No   Do you need help managing money? No   Do you ever drive or ride in a car without wearing a seat belt? No   Have you felt unusual stress, anger or loneliness in the last month? Yes   Who do you live with? Alone   If you need help, do you have trouble finding someone available to you? No   Have you been bothered in the last four weeks by sexual problems? No   Do you have difficulty concentrating, remembering or making decisions? Yes         Does the patient have evidence of cognitive impairment? No    Asprin use counseling:Does not need ASA (and currently is not on it)    Age-appropriate Screening Schedule:  Refer to the list below for future screening recommendations based on patient's age, sex and/or medical conditions. Orders for these recommended tests are listed in the plan section. The patient has been provided with a written plan.    Health Maintenance   Topic Date Due   • MAMMOGRAM  1965   • COLONOSCOPY  1965   • TDAP/TD VACCINES (1 - Tdap) 10/10/1983   • PAP SMEAR  01/17/2019   • INFLUENZA VACCINE  08/01/2020   • ZOSTER VACCINE (1 of 2) 04/08/2021 (Originally 10/10/2014)   • LIPID PANEL  09/25/2021          The following portions of the patient's history were reviewed and updated as appropriate: allergies, current medications, past family history, past medical history, past social history, past surgical history and problem list.    Outpatient Medications Prior to Visit   Medication Sig Dispense Refill   • azelastine (ASTEPRO) 0.15 % solution nasal spray USE 2 SPRAYS INTO EACH NOSTRIL 2 TIMES DAILY 30 mL 5   • buPROPion XL (WELLBUTRIN XL) 300 MG 24 hr tablet Take 1 tablet by mouth Daily. 30 tablet 5   • Calcium Carb-Cholecalciferol (Calcium-Vitamin D) 600-400 MG-UNIT tablet Take 1  "tablet by mouth 2 (two) times a day. 180 tablet 1   • CVS D3 25 MCG (1000 UT) capsule TAKE 1 CAPSULE BY MOUTH EVERY DAY 30 capsule 5   • diclofenac (Voltaren) 1 % gel gel Apply  topically to the appropriate area as directed 4 (Four) Times a Day. 100 g 5   • lansoprazole (Prevacid) 30 MG capsule Take 1 capsule by mouth Daily. 30 capsule 0   • levocetirizine (XYZAL) 5 MG tablet Take 1 tablet by mouth Every Evening. 30 tablet 0   • methocarbamol (ROBAXIN) 750 MG tablet Take 750 mg by mouth 4 (Four) Times a Day As Needed for Muscle Spasms.     • temazepam (RESTORIL) 30 MG capsule Take 1 capsule by mouth Daily.     • fluticasone (FLONASE) 50 MCG/ACT nasal spray 2 sprays into the nostril(s) as directed by provider Daily.     • gabapentin (NEURONTIN) 800 MG tablet Take 800 mg by mouth 3 (Three) Times a Day.     • fexofenadine-pseudoephedrine (ALLEGRA-D)  MG per 12 hr tablet Take 1 tablet by mouth 2 (Two) Times a Day As Needed for Allergies (sinus congestion). 30 tablet 2   • methylPREDNISolone (MEDROL, NORBERTO,) 4 MG tablet Take as directed on package instructions. 1 each 0   • silver sulfadiazine (SILVADENE, SSD) 1 % cream Apply  topically to the appropriate area as directed 2 (Two) Times a Day. 1 each 2   • Turmeric Curcumin 500 MG capsule Take  by mouth.       No facility-administered medications prior to visit.        Patient Active Problem List   Diagnosis   • On long term drug therapy   • Anxiety   • Degeneration of lumbosacral intervertebral disc   • Gastroesophageal reflux disease   • Hyperlipidemia   • Insomnia   • Low back pain   • Lumbar post-laminectomy syndrome   • Depression       Advanced Care Planning:  ACP discussion was not held with patient. Information included in after visit summary.     Review of Systems   Constitutional: Negative for fever.   HENT:        Deaf since birth due to mother having \"Ivorian measles\" during pregnancy.    GERD is not always controlled by medication and she has taken it " "twice a day at times, causing her to run out of medication.    Respiratory: Negative for shortness of breath.    Cardiovascular: Negative for chest pain.   Gastrointestinal: Negative for constipation, diarrhea, nausea and vomiting.   Musculoskeletal: Positive for arthralgias (sees ortho - ) and back pain (has seen neurology in the past, but was recently released due to being stable on current meds).   Neurological: Negative for dizziness, light-headedness and headaches.       Compared to one year ago, the patient feels her physical health is the same.  Compared to one year ago, the patient feels her mental health is the same.    Reviewed chart for potential of high risk medication in the elderly: not applicable  Reviewed chart for potential of harmful drug interactions in the elderly:not applicable    Objective         Vitals:    09/30/20 1033   BP: 129/79   BP Location: Left arm   Patient Position: Sitting   Cuff Size: Adult   Pulse: 72   Temp: 97.3 °F (36.3 °C)   TempSrc: Temporal   SpO2: 98%   Weight: 66.5 kg (146 lb 9.6 oz)   Height: 162.6 cm (64\")   PainSc:   2   PainLoc: Generalized       Body mass index is 25.16 kg/m².  Discussed the patient's BMI with her. The BMI is in the acceptable range.    Physical Exam  Vitals signs reviewed.   Constitutional:       Appearance: She is well-developed.   HENT:      Right Ear: Tympanic membrane, ear canal and external ear normal.      Left Ear: Tympanic membrane, ear canal and external ear normal.   Neck:      Vascular: No carotid bruit.   Cardiovascular:      Rate and Rhythm: Normal rate and regular rhythm.      Heart sounds: Normal heart sounds.   Pulmonary:      Effort: Pulmonary effort is normal.      Breath sounds: Normal breath sounds.   Abdominal:      General: Abdomen is flat. Bowel sounds are normal.      Palpations: Abdomen is soft.   Neurological:      Mental Status: She is alert and oriented to person, place, and time.   Psychiatric:         Behavior: Behavior " normal.               Assessment/Plan   Medicare Risks and Personalized Health Plan  CMS Preventative Services Quick Reference  Advance Directive Discussion  Breast Cancer/Mammogram Screening  Immunizations Discussed/Encouraged (specific immunizations; Influenza )    The above risks/problems have been discussed with the patient.  Pertinent information has been shared with the patient in the After Visit Summary.  Follow up plans and orders are seen below in the Assessment/Plan Section.    Diagnoses and all orders for this visit:    1. Medicare annual wellness visit, subsequent (Primary)    2. Annual physical exam    3. Encounter for immunization  -     Fluarix Quad >6 Months ([id number])    4. Colon cancer screening    5. Breast cancer screening  -     Mammo Diagnostic Digital Tomosynthesis Bilateral With CAD; Future    6. Gastroesophageal reflux disease, esophagitis presence not specified  -     famotidine (Pepcid) 20 MG tablet; Take 1 tablet by mouth 2 (Two) Times a Day.  Dispense: 60 tablet; Refill: 2    7. Lumbar post-laminectomy syndrome    8. Primary insomnia    9. Mixed hyperlipidemia    10. Encounter for observation for other suspected diseases and conditions ruled out   -     Mammo Diagnostic Digital Tomosynthesis Bilateral With CAD; Future    Other orders  -     Cancel: Ambulatory Referral to Gastroenterology      Follow Up:  Return in about 1 month (around 10/30/2020) for PAP and breast exam.   Also will needs UDS and contract on this day since we are going to take over her medications.     Intended to order atorvastatin, however, this is on patient's allergy list.  Office will contact patient to discuss reaction. May order an alternative statin.      Video  system was used for visit.   ID [id number].    An After Visit Summary and PPPS were given to the patient.       YARITZA Smith 09/30/2020

## 2020-10-01 ENCOUNTER — TELEPHONE (OUTPATIENT)
Dept: FAMILY MEDICINE CLINIC | Facility: CLINIC | Age: 55
End: 2020-10-01

## 2020-10-01 RX ORDER — ROSUVASTATIN CALCIUM 20 MG/1
20 TABLET, COATED ORAL NIGHTLY
Qty: 90 TABLET | Refills: 1 | Status: SHIPPED | OUTPATIENT
Start: 2020-10-01 | End: 2021-03-18

## 2020-10-01 NOTE — TELEPHONE ENCOUNTER
TRANSFERRING MEDICATIONS FROM [facility] TO [facility]--DONE DEALING WITH [facility]    UNSURE OF CHOL MED    FILLED 09.08.20 FROM DR BARBER GABAPENTIN 800MG (1) TID.      METHOCARBAMOL 750MG (1) TID   TEMAZEPAM 30MG (1) QHS      PT DOES NOT NEED REFILLS AT THIS TIME-JUST WANTED TO MAKE SURE PHARMACY GOT CHANGED IN CHART FROM [facility] TO [facility]. WILL LET US KNOW WHEN SHE NEEDS REFILLS.

## 2020-10-06 DIAGNOSIS — M51.37 DEGENERATION OF LUMBOSACRAL INTERVERTEBRAL DISC: Primary | ICD-10-CM

## 2020-10-06 DIAGNOSIS — F51.01 PRIMARY INSOMNIA: ICD-10-CM

## 2020-10-06 RX ORDER — GABAPENTIN 800 MG/1
800 TABLET ORAL 3 TIMES DAILY
Qty: 90 TABLET | Refills: 0 | Status: SHIPPED | OUTPATIENT
Start: 2020-10-06 | End: 2020-11-04

## 2020-10-06 RX ORDER — TEMAZEPAM 30 MG/1
30 CAPSULE ORAL DAILY
Qty: 30 CAPSULE | Refills: 0 | Status: SHIPPED | OUTPATIENT
Start: 2020-10-06 | End: 2020-11-11

## 2020-10-06 RX ORDER — METHOCARBAMOL 750 MG/1
750 TABLET, FILM COATED ORAL 3 TIMES DAILY PRN
Qty: 90 TABLET | Refills: 0 | Status: SHIPPED | OUTPATIENT
Start: 2020-10-06 | End: 2021-04-20 | Stop reason: SDUPTHER

## 2020-10-06 NOTE — TELEPHONE ENCOUNTER
Patient called requesting refills on     Gabapentin 800 mg tid  Temazepam 30 mg 1 qhs  Methocarbamol 750 mg tid    [facility].        Patient needs contract and uds.  Patient states will be out of medication before office appt scheduled 10.28.2020

## 2020-10-06 NOTE — TELEPHONE ENCOUNTER
It's a couple days early on Gabapentin, but I'm going to go ahead and fill to get them all on the same schedule.  We do contract, etc on the 28th.

## 2020-10-19 ENCOUNTER — TELEPHONE (OUTPATIENT)
Dept: FAMILY MEDICINE CLINIC | Facility: CLINIC | Age: 55
End: 2020-10-19

## 2020-10-19 NOTE — TELEPHONE ENCOUNTER
PT WAS ADVISED RESPONSE-ADVISED TO CONTACT [facility] SINCE SHE USED TO RECEIVE REFILLS THERE.  STATES SHE HAD ALREADY CHECKED WITH DR BARBER.    SHE WILL REACH OUT TO [facility] AND SEE IF MEDICATION WAS FILLED THERE AND WILL CALL US BACK EITHER TODAY OR TOMORROW.

## 2020-10-19 NOTE — TELEPHONE ENCOUNTER
Med refill request    Pt states she has 2 tablets remaining    methocarbarmol 750mg (1) tid prn    [facility]

## 2020-10-19 NOTE — TELEPHONE ENCOUNTER
Per pharmacy this was filled on 10/6, so she is not due for refill on this.   I sent in a 30 day supply, so if she only has 2 left she has been taking too many.

## 2020-10-19 NOTE — TELEPHONE ENCOUNTER
It appears that she has filled it at [facility] at some point in the past.  She may want to check there. But I would think that the easiest thing would be to contact her insurance and see what they have on file.  My order went to [facility] though.

## 2020-10-19 NOTE — TELEPHONE ENCOUNTER
SPOKE WITH PT-STATES SHE DID NOT RECEIVE THIS MEDICATION--SHE DID RECEIVE GABAPENTIN AND TEMAZEPAM.  PT STATES SHE FREAKING OUT BECAUSE SHE ONLY HAS 2 PILLS LEFT.      SALOME WAS ADVISED AND SHE WILL REACH BACK OUT TO [facility].

## 2020-10-19 NOTE — TELEPHONE ENCOUNTER
PT STATES SHE CONTACTED [facility] AND THEY DO HAVE THE MEDICATION READY FOR PICKUP. PT WAS UPSET THAT IT WAS FILLED THERE-INFORMED I WOULD CONTACT PHARMACIES FOR HER.    REACHED OUT TO [facility] AND SPOKE WITH JANESSA-PHARM TECH-SHE WILL CANCEL ROBAXIN RX.      REACHED OUT TO [facility] AND SPOKE WITH Savanna-PHARMACIST AND ADVISED SHOULD BE ABLE TO REFILL ROBAXIN THERE.  MEDICATION WENT THROUGH INSURANCE JUST FINE-HE WILL GET MEDICATION READY FOR PT.

## 2020-10-19 NOTE — TELEPHONE ENCOUNTER
Spoke with [facility] again.  They state that the patient's insurance will not approve because they are saying it was filled on on the 6th, but that she did not fill this through them. So she needs to contact her insurance to see where it was supposedly filled at and what the issue is.  The pharmacy can't get this information from her insurance.

## 2020-11-04 DIAGNOSIS — M51.37 DEGENERATION OF LUMBOSACRAL INTERVERTEBRAL DISC: ICD-10-CM

## 2020-11-04 RX ORDER — GABAPENTIN 800 MG/1
TABLET ORAL
Qty: 90 TABLET | Refills: 0 | Status: SHIPPED | OUTPATIENT
Start: 2020-11-04 | End: 2020-12-03

## 2020-11-09 ENCOUNTER — OFFICE VISIT (OUTPATIENT)
Dept: FAMILY MEDICINE CLINIC | Facility: CLINIC | Age: 55
End: 2020-11-09

## 2020-11-09 VITALS
HEIGHT: 64 IN | TEMPERATURE: 98.2 F | OXYGEN SATURATION: 98 % | WEIGHT: 147.2 LBS | SYSTOLIC BLOOD PRESSURE: 105 MMHG | HEART RATE: 64 BPM | BODY MASS INDEX: 25.13 KG/M2 | DIASTOLIC BLOOD PRESSURE: 64 MMHG

## 2020-11-09 DIAGNOSIS — N94.10 DYSPAREUNIA IN FEMALE: ICD-10-CM

## 2020-11-09 DIAGNOSIS — Z12.4 CERVICAL CANCER SCREENING: ICD-10-CM

## 2020-11-09 DIAGNOSIS — Z01.419 ENCOUNTER FOR WELL WOMAN EXAM: Primary | ICD-10-CM

## 2020-11-09 PROCEDURE — 99396 PREV VISIT EST AGE 40-64: CPT | Performed by: NURSE PRACTITIONER

## 2020-11-09 NOTE — PROGRESS NOTES
CC: well woman exam   History:  Hanna Luciano is a 55 y.o. female who presents today for evaluation of the above problems.      Hanna is here for well woman exam.  She reports discomfort with intercourse and occasional left sided suprapubic discomfort.  Has had left ovary removed.   She is due for PAP today and has her mammogram scheduled.     HPI  ROS:  Review of Systems   Constitutional: Negative for fever.   Genitourinary: Positive for dyspareunia.        Left sided suprapubic/abdominal discomfort   Skin:        No breast nodules noted       Allergies   Allergen Reactions   • Sulfamethoxazole-Trimethoprim Anaphylaxis   • Atorvastatin Unknown (See Comments)   • Baclofen Unknown (See Comments)   • Diphenhydramine Unknown (See Comments)   • Lorazepam Unknown (See Comments)   • Morphine Unknown (See Comments)   • Penicillins Unknown (See Comments)   • Soma  [Carisoprodol] Unknown (See Comments)   • Valproic Acid Unknown (See Comments)     Past Medical History:   Diagnosis Date   • Arthritis    • Depression    • Generalized headaches    • GERD (gastroesophageal reflux disease)    • Hyperlipidemia      Past Surgical History:   Procedure Laterality Date   • APPENDECTOMY     • BACK SURGERY  2003   • EYE SURGERY Left 1965    left eyebrow   • OOPHORECTOMY Left 2000     Family History   Problem Relation Age of Onset   • Hyperlipidemia Other    • Heart disease Other    • Hypertension Other    • Diabetes Other    • Cancer Other    • Arthritis Other    • Mental illness Other    • No Known Problems Mother    • No Known Problems Father       reports that she has quit smoking. Her smoking use included cigarettes and electronic cigarette. She has never used smokeless tobacco. She reports current drug use. Drug: Marijuana. She reports that she does not drink alcohol.      Current Outpatient Medications:   •  azelastine (ASTEPRO) 0.15 % solution nasal spray, USE 2 SPRAYS INTO EACH NOSTRIL 2 TIMES DAILY, Disp: 30 mL, Rfl: 5  •   "buPROPion XL (WELLBUTRIN XL) 300 MG 24 hr tablet, Take 1 tablet by mouth Daily., Disp: 30 tablet, Rfl: 5  •  [employer]S D3 25 MCG (1000 UT) capsule, TAKE 1 CAPSULE BY MOUTH EVERY DAY, Disp: 30 capsule, Rfl: 5  •  diclofenac (Voltaren) 1 % gel gel, Apply  topically to the appropriate area as directed 4 (Four) Times a Day., Disp: 100 g, Rfl: 5  •  famotidine (Pepcid) 20 MG tablet, Take 1 tablet by mouth 2 (Two) Times a Day., Disp: 60 tablet, Rfl: 2  •  fluticasone (FLONASE) 50 MCG/ACT nasal spray, 2 sprays into the nostril(s) as directed by provider Daily., Disp: , Rfl:   •  gabapentin (NEURONTIN) 800 MG tablet, TAKE 1 TABLET BY MOUTH THREE TIMES DAILY, Disp: 90 tablet, Rfl: 0  •  lansoprazole (Prevacid) 30 MG capsule, Take 1 capsule by mouth Daily., Disp: 30 capsule, Rfl: 0  •  levocetirizine (XYZAL) 5 MG tablet, Take 1 tablet by mouth Every Evening., Disp: 30 tablet, Rfl: 0  •  methocarbamol (ROBAXIN) 750 MG tablet, Take 1 tablet by mouth 3 (Three) Times a Day As Needed for Muscle Spasms., Disp: 90 tablet, Rfl: 0  •  rosuvastatin (Crestor) 20 MG tablet, Take 1 tablet by mouth Every Night., Disp: 90 tablet, Rfl: 1  •  temazepam (RESTORIL) 30 MG capsule, Take 1 capsule by mouth Daily., Disp: 30 capsule, Rfl: 0  •  Calcium Carb-Cholecalciferol (Calcium-Vitamin D) 600-400 MG-UNIT tablet, Take 1 tablet by mouth 2 (two) times a day., Disp: 180 tablet, Rfl: 1    OBJECTIVE:  /64 (BP Location: Left arm, Patient Position: Sitting, Cuff Size: Adult)   Pulse 64   Temp 98.2 °F (36.8 °C) (Temporal)   Ht 162.6 cm (64\")   Wt 66.8 kg (147 lb 3.2 oz)   LMP  (LMP Unknown)   SpO2 98%   Breastfeeding No   BMI 25.27 kg/m²    Physical Exam  Vitals signs reviewed.   Constitutional:       Appearance: She is well-developed.   Cardiovascular:      Rate and Rhythm: Normal rate.   Pulmonary:      Effort: Pulmonary effort is normal.   Chest:      Breasts:         Right: Normal.         Left: Normal.   Abdominal:      Hernia: There is no " hernia in the left inguinal area or right inguinal area.   Genitourinary:     Exam position: Lithotomy position.      Labia:         Right: No rash, tenderness, lesion or injury.         Left: No rash, tenderness, lesion or injury.       Vagina: Normal.      Cervix: Normal.      Uterus: Normal.       Adnexa: Right adnexa normal and left adnexa normal.        Right: No tenderness.          Left: No tenderness.     Lymphadenopathy:      Lower Body: No right inguinal adenopathy. No left inguinal adenopathy.   Neurological:      Mental Status: She is alert and oriented to person, place, and time.   Psychiatric:         Behavior: Behavior normal.         Assessment/Plan    Diagnoses and all orders for this visit:    1. Encounter for well woman exam (Primary)    2. Dyspareunia in female  -     US Non-ob Transvaginal; Future    3. Cervical cancer screening  -     Pap IG (Image Guided)     #[id number] used during exam.    An After Visit Summary was printed and given to the patient at discharge.  Return in about 6 months (around 5/9/2021) for Next scheduled follow up.       YARITZA Smith 11/9/2020    Electronically signed.

## 2020-11-10 DIAGNOSIS — F51.01 PRIMARY INSOMNIA: ICD-10-CM

## 2020-11-11 RX ORDER — TEMAZEPAM 30 MG/1
30 CAPSULE ORAL DAILY
Qty: 30 CAPSULE | Refills: 0 | Status: SHIPPED | OUTPATIENT
Start: 2020-11-11 | End: 2021-01-12 | Stop reason: SDUPTHER

## 2020-11-12 LAB
CYTOLOGIST CVX/VAG CYTO: NORMAL
CYTOLOGY CVX/VAG DOC CYTO: NORMAL
CYTOLOGY CVX/VAG DOC THIN PREP: NORMAL
DX ICD CODE: NORMAL
HIV 1 & 2 AB SER-IMP: NORMAL
OTHER STN SPEC: NORMAL
STAT OF ADQ CVX/VAG CYTO-IMP: NORMAL

## 2020-11-17 NOTE — PROGRESS NOTES
Ultrasound was normal.  Discomfort is probably related to thinning tissue after menopause. Vaginal estrogen would help with this if she would like to try this.  It won't help overnight.  Will take a few months.

## 2020-11-19 RX ORDER — CONJUGATED ESTROGENS 0.62 MG/G
CREAM VAGINAL DAILY
Qty: 30 G | Refills: 0 | Status: CANCELLED | OUTPATIENT
Start: 2020-11-19

## 2020-11-19 RX ORDER — ESTRADIOL 0.1 MG/G
CREAM VAGINAL
Qty: 42.5 G | Refills: 2 | Status: SHIPPED | OUTPATIENT
Start: 2020-11-19 | End: 2021-02-25 | Stop reason: SINTOL

## 2020-12-03 DIAGNOSIS — M51.37 DEGENERATION OF LUMBOSACRAL INTERVERTEBRAL DISC: ICD-10-CM

## 2020-12-03 PROBLEM — M53.3 PAIN OF LEFT SACROILIAC JOINT: Status: ACTIVE | Noted: 2020-09-14

## 2020-12-03 PROBLEM — M25.551 PAIN OF BOTH HIP JOINTS: Status: ACTIVE | Noted: 2020-09-14

## 2020-12-03 PROBLEM — M70.61 TROCHANTERIC BURSITIS OF RIGHT HIP: Status: ACTIVE | Noted: 2020-09-14

## 2020-12-03 PROBLEM — M70.62 TROCHANTERIC BURSITIS OF LEFT HIP: Status: ACTIVE | Noted: 2020-09-14

## 2020-12-03 PROBLEM — M25.552 PAIN OF BOTH HIP JOINTS: Status: ACTIVE | Noted: 2020-09-14

## 2020-12-03 RX ORDER — GABAPENTIN 800 MG/1
TABLET ORAL
Qty: 90 TABLET | Refills: 0 | Status: SHIPPED | OUTPATIENT
Start: 2020-12-03 | End: 2021-01-08

## 2020-12-03 NOTE — TELEPHONE ENCOUNTER
Patient had CPE on 09/30/2020.  You filled her gabapentin 11/02/2020.  We have no contract or UDS on this patient and no up coming appt.  Do you want to continue with refills?

## 2021-01-08 DIAGNOSIS — M51.37 DEGENERATION OF LUMBOSACRAL INTERVERTEBRAL DISC: ICD-10-CM

## 2021-01-08 RX ORDER — GABAPENTIN 800 MG/1
TABLET ORAL
Qty: 90 TABLET | Refills: 0 | Status: SHIPPED | OUTPATIENT
Start: 2021-01-08 | End: 2021-05-10 | Stop reason: SDUPTHER

## 2021-01-11 ENCOUNTER — TELEPHONE (OUTPATIENT)
Dept: INTERNAL MEDICINE | Facility: CLINIC | Age: 56
End: 2021-01-11

## 2021-01-11 RX ORDER — BUPROPION HYDROCHLORIDE 300 MG/1
TABLET ORAL
Qty: 30 TABLET | Refills: 2 | Status: SHIPPED | OUTPATIENT
Start: 2021-01-11 | End: 2021-04-26

## 2021-01-11 NOTE — TELEPHONE ENCOUNTER
PATIENT IS CALLING TO REQUEST A CALL BACK FROM THE CLINICAL STAFF REGARDING HER MEDICATION. DID NOT GO INTO DETAIL.     PT CALL BACK [phone]

## 2021-01-12 DIAGNOSIS — F51.01 PRIMARY INSOMNIA: ICD-10-CM

## 2021-01-12 RX ORDER — TEMAZEPAM 30 MG/1
30 CAPSULE ORAL DAILY
Qty: 30 CAPSULE | Refills: 0 | Status: SHIPPED | OUTPATIENT
Start: 2021-01-12 | End: 2021-04-12 | Stop reason: SDUPTHER

## 2021-01-12 NOTE — TELEPHONE ENCOUNTER
Patient was wanting to check on her med refill.  I informed her it was sent to her pharmacy today.

## 2021-01-12 NOTE — TELEPHONE ENCOUNTER
Caller: Mustapha Hanna Anali    Relationship: Self    Best call back number: [phone]      Medication needed:   Requested Prescriptions     Pending Prescriptions Disp Refills   • temazepam (RESTORIL) 30 MG capsule 30 capsule 0     Sig: Take 1 capsule by mouth Daily.       When do you need the refill by:  asap      Does the patient have less than a 3 day supply:  [x] Yes  [] No    What is the patient's preferred pharmacy: [facility] [address] [phone] [facility] [phone]

## 2021-01-25 ENCOUNTER — OFFICE VISIT (OUTPATIENT)
Dept: FAMILY MEDICINE CLINIC | Facility: CLINIC | Age: 56
End: 2021-01-25

## 2021-01-25 VITALS
HEIGHT: 64 IN | TEMPERATURE: 98.2 F | HEART RATE: 104 BPM | RESPIRATION RATE: 16 BRPM | BODY MASS INDEX: 24.11 KG/M2 | DIASTOLIC BLOOD PRESSURE: 82 MMHG | WEIGHT: 141.2 LBS | OXYGEN SATURATION: 98 % | SYSTOLIC BLOOD PRESSURE: 130 MMHG

## 2021-01-25 DIAGNOSIS — E78.2 MIXED HYPERLIPIDEMIA: ICD-10-CM

## 2021-01-25 DIAGNOSIS — M51.37 DEGENERATION OF LUMBOSACRAL INTERVERTEBRAL DISC: Primary | ICD-10-CM

## 2021-01-25 PROCEDURE — 99214 OFFICE O/P EST MOD 30 MIN: CPT | Performed by: NURSE PRACTITIONER

## 2021-01-25 NOTE — PROGRESS NOTES
CC: neuropathy - controlled medication monitoring    History:  Hanna Luciano is a 55 y.o. female who presents today for evaluation of the above problems.     Patient suffers from low back pain with radiculopathy as well as migraine headaches.   Pain is well controlled on gabapentin.  When she exerts herself more than normal she can tell that it increases, but otherwise gabapentin keeps her symptoms well controlled.    Also takes restoril for sleep.   Medication was previously managed by Dr. Beth, until he released her in July of 2020 due to stability on current treatment. He did continue her refills until November.   She is here today for UDS and contract.     CONTROLLED SUBSTANCE TRACKING 1/25/2021   Last Shine 1/25/2021   Report Number Karyn reviewed through [employer]   Last UDS 1/25/2021   Last Controlled Substance Agreement 1/25/2021       HPI  ROS:  Review of Systems   Constitutional: Negative for fever.   Musculoskeletal: Positive for arthralgias and back pain.   Neurological: Negative for headaches.   Psychiatric/Behavioral: Positive for sleep disturbance (controlled with temazepam).       Allergies   Allergen Reactions   • Sulfamethoxazole-Trimethoprim Anaphylaxis   • Atorvastatin Unknown (See Comments)   • Baclofen Unknown (See Comments)   • Diphenhydramine Unknown (See Comments)   • Lorazepam Unknown (See Comments)   • Morphine Unknown (See Comments)   • Penicillins Unknown (See Comments)   • Soma  [Carisoprodol] Unknown (See Comments)   • Valproic Acid Unknown (See Comments)     Past Medical History:   Diagnosis Date   • Arthritis    • Depression    • Generalized headaches    • GERD (gastroesophageal reflux disease)    • Hyperlipidemia      Past Surgical History:   Procedure Laterality Date   • APPENDECTOMY     • BACK SURGERY  2003   • EYE SURGERY Left 1965    left eyebrow   • OOPHORECTOMY Left 2000     Family History   Problem Relation Age of Onset   • Hyperlipidemia Other    • Heart disease Other    •  Hypertension Other    • Diabetes Other    • Cancer Other    • Arthritis Other    • Mental illness Other    • No Known Problems Mother    • No Known Problems Father       reports that she has quit smoking. Her smoking use included cigarettes and electronic cigarette. She has never used smokeless tobacco. She reports current drug use. Drug: Marijuana. She reports that she does not drink alcohol.      Current Outpatient Medications:   •  azelastine (ASTEPRO) 0.15 % solution nasal spray, USE 2 SPRAYS INTO EACH NOSTRIL 2 TIMES DAILY, Disp: 30 mL, Rfl: 5  •  buPROPion XL (WELLBUTRIN XL) 300 MG 24 hr tablet, TAKE 1 TABLET BY MOUTH EVERY DAY, Disp: 30 tablet, Rfl: 2  •  Calcium Carb-Cholecalciferol (Calcium-Vitamin D) 600-400 MG-UNIT tablet, Take 1 tablet by mouth 2 (two) times a day., Disp: 180 tablet, Rfl: 1  •  CVS D3 25 MCG (1000 UT) capsule, TAKE 1 CAPSULE BY MOUTH EVERY DAY, Disp: 30 capsule, Rfl: 5  •  diclofenac (Voltaren) 1 % gel gel, Apply  topically to the appropriate area as directed 4 (Four) Times a Day., Disp: 100 g, Rfl: 5  •  estradiol (ESTRACE VAGINAL) 0.1 MG/GM vaginal cream, Insert 2 g into the vagina Daily for 14 days, THEN 1 g Daily for 14 days, THEN 1 g 3 (Three) Times a Week., Disp: 42.5 g, Rfl: 2  •  famotidine (Pepcid) 20 MG tablet, Take 1 tablet by mouth 2 (Two) Times a Day., Disp: 60 tablet, Rfl: 2  •  fluticasone (FLONASE) 50 MCG/ACT nasal spray, 2 sprays into the nostril(s) as directed by provider Daily., Disp: , Rfl:   •  gabapentin (NEURONTIN) 800 MG tablet, TAKE 1 TABLET BY MOUTH THREE TIMES DAILY, Disp: 90 tablet, Rfl: 0  •  lansoprazole (Prevacid) 30 MG capsule, Take 1 capsule by mouth Daily., Disp: 30 capsule, Rfl: 0  •  levocetirizine (XYZAL) 5 MG tablet, Take 1 tablet by mouth Every Evening., Disp: 30 tablet, Rfl: 0  •  methocarbamol (ROBAXIN) 750 MG tablet, Take 1 tablet by mouth 3 (Three) Times a Day As Needed for Muscle Spasms. (Patient taking differently: Take 750 mg by mouth 3  "(Three) Times a Day As Needed for Muscle Spasms.), Disp: 90 tablet, Rfl: 0  •  rosuvastatin (Crestor) 20 MG tablet, Take 1 tablet by mouth Every Night., Disp: 90 tablet, Rfl: 1  •  temazepam (RESTORIL) 30 MG capsule, Take 1 capsule by mouth Daily., Disp: 30 capsule, Rfl: 0    OBJECTIVE:  /82 (BP Location: Left arm, Patient Position: Sitting, Cuff Size: Adult)   Pulse 104   Temp 98.2 °F (36.8 °C) (Temporal)   Resp 16   Ht 162.6 cm (64\")   Wt 64 kg (141 lb 3.2 oz)   LMP  (LMP Unknown)   SpO2 98%   BMI 24.24 kg/m²    Physical Exam  Vitals signs reviewed.   Constitutional:       Appearance: She is well-developed.   Cardiovascular:      Rate and Rhythm: Normal rate and regular rhythm.   Pulmonary:      Effort: Pulmonary effort is normal.      Breath sounds: Normal breath sounds.   Neurological:      Mental Status: She is alert and oriented to person, place, and time.   Psychiatric:         Behavior: Behavior normal.         Assessment/Plan    Diagnoses and all orders for this visit:    1. Degeneration of lumbosacral intervertebral disc (Primary)  -     Gabapentin, Urine - Urine, Clean Catch  -     ToxASSURE Select 13 (MW) - Urine, Clean Catch    2. Mixed hyperlipidemia  -     Lipid Panel    Recheck lipid panel since starting statin.      [id number] used for exam.    An After Visit Summary was printed and given to the patient at discharge.  Return in about 3 months (around 4/25/2021) for Recheck.       Karyn Claros, APRN 1/25/21    Electronically signed.  "

## 2021-01-26 LAB
CHOLEST SERPL-MCNC: 151 MG/DL (ref 0–200)
HDLC SERPL-MCNC: 79 MG/DL (ref 40–60)
LDLC SERPL CALC-MCNC: 57 MG/DL (ref 0–100)
TRIGL SERPL-MCNC: 78 MG/DL (ref 0–150)
VLDLC SERPL CALC-MCNC: 15 MG/DL (ref 5–40)

## 2021-01-28 DIAGNOSIS — K21.9 GASTROESOPHAGEAL REFLUX DISEASE: ICD-10-CM

## 2021-01-28 RX ORDER — FAMOTIDINE 20 MG/1
TABLET, FILM COATED ORAL
Qty: 60 TABLET | Refills: 6 | Status: SHIPPED | OUTPATIENT
Start: 2021-01-28 | End: 2021-04-28 | Stop reason: HOSPADM

## 2021-01-29 ENCOUNTER — TELEPHONE (OUTPATIENT)
Dept: FAMILY MEDICINE CLINIC | Facility: CLINIC | Age: 56
End: 2021-01-29

## 2021-01-29 DIAGNOSIS — Z51.81 MEDICATION MONITORING ENCOUNTER: Primary | ICD-10-CM

## 2021-01-29 LAB
DRUGS UR: NORMAL
GABAPENTIN UR-MCNC: 330.5 UG/ML

## 2021-01-29 NOTE — TELEPHONE ENCOUNTER
Spoke with patient. Advised that with positive UDS we would be unable to prescribe controlled substances.  She expressed understanding. We will give her another chance based on this and will repeat screen in 6 weeks.

## 2021-02-05 ENCOUNTER — TELEPHONE (OUTPATIENT)
Dept: INTERNAL MEDICINE | Facility: CLINIC | Age: 56
End: 2021-02-05

## 2021-02-05 NOTE — TELEPHONE ENCOUNTER
PATIENT CALLS       REQUESTS TO CONTACT DR GEE -NEUROLOGY   AND COORDINATE AN ORDER FOR X RAY WITH CONTRAST FOR HER BACK   STATES SHE HAS BEEN IN PAIN WITH IT     GOOD CONTACT NUMBER   [phone] (M)

## 2021-02-08 DIAGNOSIS — F51.01 PRIMARY INSOMNIA: ICD-10-CM

## 2021-02-08 RX ORDER — TEMAZEPAM 30 MG/1
30 CAPSULE ORAL DAILY
Qty: 30 CAPSULE | Refills: 0 | OUTPATIENT
Start: 2021-02-08

## 2021-02-09 ENCOUNTER — TELEPHONE (OUTPATIENT)
Dept: FAMILY MEDICINE CLINIC | Facility: CLINIC | Age: 56
End: 2021-02-09

## 2021-02-09 NOTE — TELEPHONE ENCOUNTER
"STOMACH INFLAMMATION-HAS BEEN TAKING MEDICATIONS THAT HAD HELPED INCLUDING PREVACID 30MG.  STATES SHE WAS PRESCRIBED PEPCID 20MG-NOT HELPING AND FEELING FIRE IN ABDOMEN-NOT SURE WHY MED CHANGED-OTHER ONE HAD BEEN WORKING.     BACK SURGERY THAT CAUSES NERVE PAIN-STATES SHE WILL TAKE A DRUG TEST TO SHOW SHE IS NEGATIVE.  PT IS UPSET AND STATES [facility]OME IS YOUNG AND DOESN'T KNOW WHAT SHE IS DOING-DO YOU LIKE TO SEE PEOPLE SUFFERING?   STATES WILL TAKE A DRUG TEST ON 03.12.21 AND THEN STATES SHE IS DONE AND MOVING ON TO ANOTHER DOCTOR.   STATES DR BARBER IS WAITING FOR A CALL FROM [facility] TO UPDATE HIM ABOUT MEDICATIONS..MENTIONED \"TEM\" MEDICATION.  "

## 2021-02-09 NOTE — TELEPHONE ENCOUNTER
PT STATES SHE UNDERSTANDS HOW THE LAW WORKS--SHE SAID SHE WILL PROVE SHE IS NEGATIVE ON MARCH 12 FOR DRUG TEST AND SHE DOES NOT WANT TO SEE HER ANYMORE.  SHE IS NOT DOING A GOOD JOB WITH MY MEDICATIONS AND SHE IS NOT EXPLAINING THINGS CLEARLY-SHE IS TOO YOUNG.

## 2021-02-09 NOTE — TELEPHONE ENCOUNTER
She was supposed to take the pepcid in addition to her prevacid for breakthrough symptoms, not stop the prevacid.  Her temazepam was last filled on January 12, so it is not yet due for refill.  As I told her when I spoke to her, I will fill her medication in the interim, however, if she tests positive again, I will be unable to.  However, if she does not have confidence in the care she is receiving it would be in her best interest to seek another provider as this is not conducive to a good provider/patient relationship.   We have not stopped any of her medications, only stated that, per state law, we will not be able to order controlled medications with a continued positive drug screen.

## 2021-02-09 NOTE — TELEPHONE ENCOUNTER
That is unfortunate. Hopefully she will be negative so maybe this won’t be an issue for her in the future.  We are working through an , so, while I am confident that I have explained thoroughly, and she has verbalized understanding both in the office and on the phone when I asked if she had any questions, perhaps she is not being told everything I have said.   We can rescreen if she would like, but if she is going elsewhe re there is no need from our standpoint.   No need to call her back.

## 2021-02-22 ENCOUNTER — TELEPHONE (OUTPATIENT)
Dept: INTERNAL MEDICINE | Facility: CLINIC | Age: 56
End: 2021-02-22

## 2021-02-22 ENCOUNTER — TELEPHONE (OUTPATIENT)
Dept: FAMILY MEDICINE CLINIC | Facility: CLINIC | Age: 56
End: 2021-02-22

## 2021-02-22 DIAGNOSIS — Z79.4 ENCOUNTER FOR LONG-TERM (CURRENT) USE OF INSULIN (HCC): ICD-10-CM

## 2021-02-22 NOTE — TELEPHONE ENCOUNTER
Spoke with Dr. Beth on 2/9/21 regarding this patient.    Failed to document call at that time.   Discussed patient's positive drug screen.  Dr. Beth states that he has known this patient for a very long time and that she has legitimate pain.  He states that patient attempted to stop taking her gabapentin without direction and ended up in severe pain.  He states that he has reordered her medication. He states that patient has been taking OTC gummies which have resulted in positive drug screen.   Dr. Beth was informed by me that I had told patient that we would repeat her drug screen in 6 weeks and that if it remained positive we would be unable to order her controlled medications in the future. Dr. Beth provided me with his personal cell phone number for any future needs with this (or other) patients.

## 2021-02-22 NOTE — TELEPHONE ENCOUNTER
I concur with Karyn Sahu's evaluation and medical approach this patient.  I will have nothing else to offer in the way of medication prescriptions or polypharmacy issues.

## 2021-02-22 NOTE — TELEPHONE ENCOUNTER
PATIENT CALLING IN WITH  REQUESTING A NEW PATIENT APPOINTMENT BE SCHEDULED FOR DR. PRADO. SCHEDULED APPOINTMENT FOR Wednesday 02/24/2021 AND PATIENT IS REQUESTING I-PAD AND NOT COMPUTER.    PATIENT CALLBACK # [phone]

## 2021-02-24 ENCOUNTER — OFFICE VISIT (OUTPATIENT)
Dept: FAMILY MEDICINE CLINIC | Facility: CLINIC | Age: 56
End: 2021-02-24

## 2021-02-24 VITALS
BODY MASS INDEX: 23.32 KG/M2 | HEIGHT: 64 IN | OXYGEN SATURATION: 99 % | WEIGHT: 136.6 LBS | HEART RATE: 109 BPM | SYSTOLIC BLOOD PRESSURE: 125 MMHG | TEMPERATURE: 98.6 F | DIASTOLIC BLOOD PRESSURE: 83 MMHG

## 2021-02-24 DIAGNOSIS — K21.9 GASTROESOPHAGEAL REFLUX DISEASE, UNSPECIFIED WHETHER ESOPHAGITIS PRESENT: ICD-10-CM

## 2021-02-24 DIAGNOSIS — Z12.11 COLON CANCER SCREENING: ICD-10-CM

## 2021-02-24 DIAGNOSIS — R39.198 DIFFICULTY URINATING: ICD-10-CM

## 2021-02-24 DIAGNOSIS — N93.9 VAGINAL BLEEDING: ICD-10-CM

## 2021-02-24 DIAGNOSIS — R10.9 ABDOMINAL PAIN, UNSPECIFIED ABDOMINAL LOCATION: Primary | ICD-10-CM

## 2021-02-24 DIAGNOSIS — R10.2 SUPRAPUBIC PAIN: ICD-10-CM

## 2021-02-24 LAB
BILIRUB BLD-MCNC: NEGATIVE MG/DL
CLARITY, POC: CLEAR
COLOR UR: YELLOW
GLUCOSE UR STRIP-MCNC: NEGATIVE MG/DL
KETONES UR QL: NEGATIVE
LEUKOCYTE EST, POC: NEGATIVE
NITRITE UR-MCNC: NEGATIVE MG/ML
PH UR: 5.5 [PH] (ref 5–8)
PROT UR STRIP-MCNC: NEGATIVE MG/DL
RBC # UR STRIP: ABNORMAL /UL
SP GR UR: 1.02 (ref 1–1.03)
UROBILINOGEN UR QL: NORMAL

## 2021-02-24 PROCEDURE — 99215 OFFICE O/P EST HI 40 MIN: CPT | Performed by: NURSE PRACTITIONER

## 2021-02-24 PROCEDURE — 81003 URINALYSIS AUTO W/O SCOPE: CPT | Performed by: NURSE PRACTITIONER

## 2021-02-24 NOTE — PROGRESS NOTES
"CC: discuss medications    History:  Hanna Luciano is a 55 y.o. female who presents today for evaluation of the above problems.      Patient here with in person , Isa Muir with [facility]. Juju Walsh MA is also in room for visit.    Patient had positive UDS for THC on 1/25/21. She was advised that should her screen continue to be positive after rescreen in 6 weeks we would be unable to continue to order her controlled medications.     Since that time, patient has stated that she was not coming back to clinic other than repeat UDS to show it is negative. Stated that she was \"done\" with seeing me. She has stated that I was too young and didn't know what I was doing, that I do not manage her medications correctly, that I failed to contact Dr. Beth, her neurologist, when requested. Also told staff that \"she knows who my friends are\" and she is not comfortable seeing me. She has asked if \"I liked to see people suffer.\" She has expressed dissatisfaction with the treatment of her GERD - adding Pepcid to her PPI. At her visit in September, when Pepcid was added, I was told by her  that her GERD was not always controlled by Prevacid and she was taking it twice a day at times, causing her to run out of medication.  Patient also stated that I gave her medication that caused vaginal bleeding - referring to vaginal estrogen to treat her postmenopausal dyspareunia. This medication was ordered in November, however, she did not advise us of any issues until the last few days.     She sought to change to Dr. Olguin as provider. He was not available at 3:30 today and patient was offered again to see me and she agreed, stating \"if she will agree to work with me I will work with her.\"  None of her issues with this provider came to light until after her positive UDS on 1/25.  I have been seeing her as a patient since 09/20.     Patient states that for the past month she has had vaginal pressure and pain " when sitting. Complains of difficulty urinating, will have to sit for a while to start urine stream.   Also having soft bowel movements for about the past month.  Her colonoscopy is not up to date and she expresses that she does not really want to have that done right now.  I did her pelvic exam and PAP in November and did not note any anatomical abnormality at that time, however, due to discomfort with intercourse and left sided suprapubic discomfort I did order a transvaginal ultrasound, which was normal.  I did order vaginal estrogen to help with vaginal symptoms of menopause.  Patient states that while using this she developed vaginal bleeding.  She has stopped the cream and the bleeding did stop.        She feels that her back and left hip pain may be worsening some. She did have an MRI in around 2017 by Dr. Beth. Her pain is controlled acceptably if she takes tylenol in addition to her gabapentin.           HPI  ROS:  Review of Systems   Constitutional: Negative for fever.   Gastrointestinal: Positive for abdominal pain.   Genitourinary: Positive for difficulty urinating, dyspareunia, pelvic pain and vaginal pain.   Musculoskeletal: Positive for arthralgias and back pain.   Psychiatric/Behavioral: The patient is nervous/anxious.        Allergies   Allergen Reactions   • Sulfamethoxazole-Trimethoprim Anaphylaxis   • Atorvastatin Unknown (See Comments)   • Baclofen Unknown (See Comments)   • Diphenhydramine Unknown (See Comments)   • Lorazepam Unknown (See Comments)   • Morphine Unknown (See Comments)   • Penicillins Unknown (See Comments)   • Soma  [Carisoprodol] Unknown (See Comments)   • Valproic Acid Unknown (See Comments)     Past Medical History:   Diagnosis Date   • Arthritis    • Depression    • Generalized headaches    • GERD (gastroesophageal reflux disease)    • Hyperlipidemia      Past Surgical History:   Procedure Laterality Date   • APPENDECTOMY     • BACK SURGERY  2003   • EYE SURGERY Left 1965     left eyebrow   • OOPHORECTOMY Left 2000     Family History   Problem Relation Age of Onset   • Hyperlipidemia Other    • Heart disease Other    • Hypertension Other    • Diabetes Other    • Cancer Other    • Arthritis Other    • Mental illness Other    • No Known Problems Mother    • No Known Problems Father       reports that she has quit smoking. Her smoking use included cigarettes and electronic cigarette. She has never used smokeless tobacco. She reports current drug use. Drug: Marijuana. She reports that she does not drink alcohol.      Current Outpatient Medications:   •  azelastine (ASTEPRO) 0.15 % solution nasal spray, USE 2 SPRAYS INTO EACH NOSTRIL 2 TIMES DAILY, Disp: 30 mL, Rfl: 5  •  buPROPion XL (WELLBUTRIN XL) 300 MG 24 hr tablet, TAKE 1 TABLET BY MOUTH EVERY DAY, Disp: 30 tablet, Rfl: 2  •  diclofenac (Voltaren) 1 % gel gel, Apply  topically to the appropriate area as directed 4 (Four) Times a Day., Disp: 100 g, Rfl: 5  •  fluticasone (FLONASE) 50 MCG/ACT nasal spray, 2 sprays into the nostril(s) as directed by provider Daily., Disp: , Rfl:   •  gabapentin (NEURONTIN) 800 MG tablet, TAKE 1 TABLET BY MOUTH THREE TIMES DAILY, Disp: 90 tablet, Rfl: 0  •  lansoprazole (Prevacid) 30 MG capsule, Take 1 capsule by mouth Daily., Disp: 30 capsule, Rfl: 0  •  levocetirizine (XYZAL) 5 MG tablet, Take 1 tablet by mouth Every Evening., Disp: 30 tablet, Rfl: 0  •  methocarbamol (ROBAXIN) 750 MG tablet, Take 1 tablet by mouth 3 (Three) Times a Day As Needed for Muscle Spasms. (Patient taking differently: Take 750 mg by mouth 3 (Three) Times a Day As Needed for Muscle Spasms.), Disp: 90 tablet, Rfl: 0  •  rosuvastatin (Crestor) 20 MG tablet, Take 1 tablet by mouth Every Night., Disp: 90 tablet, Rfl: 1  •  temazepam (RESTORIL) 30 MG capsule, Take 1 capsule by mouth Daily., Disp: 30 capsule, Rfl: 0  •  famotidine (PEPCID) 20 MG tablet, TAKE 1 TABLET BY MOUTH TWO TIMES A DAY., Disp: 60 tablet, Rfl:  "6    OBJECTIVE:  /83 (BP Location: Left arm, Patient Position: Sitting, Cuff Size: Adult)   Pulse 109   Temp 98.6 °F (37 °C) (Temporal)   Ht 162.6 cm (64\") Comment: pt reported  Wt 62 kg (136 lb 9.6 oz)   LMP  (LMP Unknown)   SpO2 99%   Breastfeeding No   BMI 23.45 kg/m²    Physical Exam  Vitals signs reviewed.   Constitutional:       Appearance: She is well-developed.   Cardiovascular:      Rate and Rhythm: Normal rate.   Pulmonary:      Effort: Pulmonary effort is normal.   Abdominal:      General: Abdomen is flat.      Palpations: Abdomen is soft.      Tenderness: There is abdominal tenderness (rlq).   Neurological:      Mental Status: She is alert and oriented to person, place, and time.   Psychiatric:         Behavior: Behavior normal.         Assessment/Plan      The following was communicated to patient via in-person  at the beginning of this encounter:    We need to discuss a few items before we proceed.    1. I do not like to see anyone suffer, but there are rules and laws which must be abided by. I will treat you like I would my own family, but these rules and laws are out of my control. Abiding by them is up to you, not me. If you choose not to do so, then it is very difficult for me to help you.    2.  Being disrespectful of me and running both me and this practice down to my staff will not be tolerated at all. I expect everyone here to treat each of my patients with respect, and we expect the same in return.    3. When you have issues with medications, you have to tell me or I can't help you. Any issues need to be communicated in a timely manner. Waiting several months to tell us of issues can be harmful to your health.    As long as we have an understanding on these things, we can continue... Do we have an understanding?  Patient agrees        A few more items we need to discuss:    4. On February 9th when I was informed that Dr. Beth was awaiting my call " regarding your medications I spoke with him that same day. In fact he gave me his personal cell phone number to reach him in the future if needed.  Patient expressed understanding.    5. When your temazepam was refused it was because it was not time for it to be refilled. It was filled on January 12 and the refill was requested on February 8.  Controlled medications may not be filled early. Your medications were not stopped.  Other issues with your robaxin (methocarbamol) being filled prior to this occurred when you changed pharmacies. Our order was sent to the pharmacy of your request at the appropriate time.  Patient expressed understanding.    6. Treating acid reflux with two different medications is a very common practice, however,I am going to set you up with GI in [place] and you can discuss this with them. With your reflux being so difficult to control you need to have an endoscopy and evaluate your esophagus. When you came to see me last September, your Prevacid wasn't working well and you were having to take it twice a day and running out of medication before the end of the month.  You were taking more than the maximum recommended dose on the days you took it twice.  The Pepcid was added to be taken in addition to the Prevacid for breakthrough symptoms since you were already on the maximum dosage of Prevacid.  Also, I do not have record of a colonoscopy on you.  If you are currently due for a colonoscopy GI can address this when you are there also. I have information on transportation assistance if you need that to get to [place].     Patient expresses that she really doesn't want to have colonoscopy. Advised that she can discuss with GI since she will be referred for her GERD anyway.    7.  If  your back pain is no longer stable it needs further evaluation - xrays, physical therapy, MRI. When you were here on 1/25 you said that your pain was controlled on gabapentin unless you overexerted yourself, and per  Dr. Beth you had been stable on your medications for some time. If your symptoms are changing this needs further evaluation.     Patient states that with tylenol, pain is tolerable, but she does feel it is progressing some.    Diagnoses and all orders for this visit:    1. Abdominal pain, unspecified abdominal location (Primary)  -     Comprehensive Metabolic Panel  -     Amylase  -     Lipase  -     CBC & Differential    2. Difficulty urinating  -     POC Urinalysis Dipstick, Multipro  -     Ambulatory Referral to Obstetrics / Gynecology    3. Gastroesophageal reflux disease, unspecified whether esophagitis present  -     Ambulatory Referral to Gastroenterology    4. Colon cancer screening  -     Ambulatory Referral to Gastroenterology    5. Suprapubic pain  -     Ambulatory Referral to Obstetrics / Gynecology    6. Vaginal bleeding  -     Ambulatory Referral to Obstetrics / Gynecology    Patient advised to stop any use of the vaginal estrogen due to it causing vaginal bleeding.  Will refer to GYN for further evaluation of suprapubic discomfort and difficulty voiding.  Advised to take Prevacid and Pepcid. Will refer to GI for further evaluation of GERD and colonoscopy if she will consent to it.       An After Visit Summary was printed and given to the patient at discharge.  Return in about 2 months (around 4/24/2021) for Controlled medication monitoring..       Karyn Claros, APRN 2/25/21    Electronically signed.

## 2021-02-24 NOTE — PROGRESS NOTES
Patient has in person  present, Isa Wood with [facility].  Patient gives consent  to be present during office visit.

## 2021-02-25 ENCOUNTER — TELEPHONE (OUTPATIENT)
Dept: FAMILY MEDICINE CLINIC | Facility: CLINIC | Age: 56
End: 2021-02-25

## 2021-02-25 PROBLEM — G25.81 RESTLESS LEG SYNDROME: Status: ACTIVE | Noted: 2021-02-25

## 2021-02-25 NOTE — TELEPHONE ENCOUNTER
Patient was contacted through  over the phone and advised provider's instructions.  Patient verbalized understanding.

## 2021-02-25 NOTE — TELEPHONE ENCOUNTER
Please advise that I have looked over her written notes that she brought yesterday.      Yes, she will need to continue her Crestor for life.   She will probably need her allergy medication for life too, however, she can try stopping this if she would like and see how she does symptom wise without it.  For her arthritis in the hands, tylenol - and use her diclofenac gel on her hands.    I apologize that I didn't see the issue with her right ear when she was here yesterday, but we will be sure to check on that at her next visit.

## 2021-02-26 ENCOUNTER — NURSE TRIAGE (OUTPATIENT)
Dept: CALL CENTER | Facility: HOSPITAL | Age: 56
End: 2021-02-26

## 2021-02-26 DIAGNOSIS — E87.5 HIGH POTASSIUM: Primary | ICD-10-CM

## 2021-02-26 DIAGNOSIS — K21.9 GASTROESOPHAGEAL REFLUX DISEASE: ICD-10-CM

## 2021-02-26 LAB
ALBUMIN SERPL-MCNC: 4.7 G/DL (ref 3.5–5.2)
ALBUMIN/GLOB SERPL: 2.5 G/DL
ALP SERPL-CCNC: 78 U/L (ref 39–117)
ALT SERPL-CCNC: 14 U/L (ref 1–33)
AMYLASE SERPL-CCNC: 52 U/L (ref 28–100)
AST SERPL-CCNC: 21 U/L (ref 1–32)
BASOPHILS # BLD AUTO: 0.05 10*3/MM3 (ref 0–0.2)
BASOPHILS NFR BLD AUTO: 1.1 % (ref 0–1.5)
BILIRUB SERPL-MCNC: 0.3 MG/DL (ref 0–1.2)
BUN SERPL-MCNC: 10 MG/DL (ref 6–20)
BUN/CREAT SERPL: 9.8 (ref 7–25)
CALCIUM SERPL-MCNC: 10.3 MG/DL (ref 8.6–10.5)
CHLORIDE SERPL-SCNC: 100 MMOL/L (ref 98–107)
CO2 SERPL-SCNC: 28.2 MMOL/L (ref 22–29)
CREAT SERPL-MCNC: 1.02 MG/DL (ref 0.57–1)
EOSINOPHIL # BLD AUTO: 0.04 10*3/MM3 (ref 0–0.4)
EOSINOPHIL NFR BLD AUTO: 0.9 % (ref 0.3–6.2)
ERYTHROCYTE [DISTWIDTH] IN BLOOD BY AUTOMATED COUNT: 11.2 % (ref 12.3–15.4)
GLOBULIN SER CALC-MCNC: 1.9 GM/DL
GLUCOSE SERPL-MCNC: 79 MG/DL (ref 65–99)
HCT VFR BLD AUTO: 45.1 % (ref 34–46.6)
HGB BLD-MCNC: 14.4 G/DL (ref 12–15.9)
IMM GRANULOCYTES # BLD AUTO: 0 10*3/MM3 (ref 0–0.05)
IMM GRANULOCYTES NFR BLD AUTO: 0 % (ref 0–0.5)
LIPASE SERPL-CCNC: 28 U/L (ref 13–60)
LYMPHOCYTES # BLD AUTO: 1.5 10*3/MM3 (ref 0.7–3.1)
LYMPHOCYTES NFR BLD AUTO: 33.9 % (ref 19.6–45.3)
MCH RBC QN AUTO: 32.7 PG (ref 26.6–33)
MCHC RBC AUTO-ENTMCNC: 31.9 G/DL (ref 31.5–35.7)
MCV RBC AUTO: 102.3 FL (ref 79–97)
MONOCYTES # BLD AUTO: 0.54 10*3/MM3 (ref 0.1–0.9)
MONOCYTES NFR BLD AUTO: 12.2 % (ref 5–12)
NEUTROPHILS # BLD AUTO: 2.29 10*3/MM3 (ref 1.7–7)
NEUTROPHILS NFR BLD AUTO: 51.9 % (ref 42.7–76)
NRBC BLD AUTO-RTO: 0 /100 WBC (ref 0–0.2)
PLATELET # BLD AUTO: 226 10*3/MM3 (ref 140–450)
POTASSIUM SERPL-SCNC: 6.6 MMOL/L (ref 3.5–5.2)
PROT SERPL-MCNC: 6.6 G/DL (ref 6–8.5)
RBC # BLD AUTO: 4.41 10*6/MM3 (ref 3.77–5.28)
SODIUM SERPL-SCNC: 139 MMOL/L (ref 136–145)
WBC # BLD AUTO: 4.42 10*3/MM3 (ref 3.4–10.8)

## 2021-02-26 RX ORDER — LANSOPRAZOLE 30 MG/1
30 CAPSULE, DELAYED RELEASE ORAL DAILY
Qty: 30 CAPSULE | Refills: 5 | Status: SHIPPED | OUTPATIENT
Start: 2021-02-26 | End: 2021-11-20 | Stop reason: SDUPTHER

## 2021-02-26 NOTE — TELEPHONE ENCOUNTER
Natalie Camacho from lab calling in critical potassium of 6.6 drawn 2/25/21 at 0828.  0216 Call placed directly to YARITZA Smith and result reported along with date and time of draw.

## 2021-02-26 NOTE — PROGRESS NOTES
Patient needs to return TODAY to recheck BMP.  Potassium is dangerously high if this is accurate, but I suspect it is lab error.

## 2021-02-27 LAB
BUN SERPL-MCNC: 10 MG/DL (ref 6–20)
BUN/CREAT SERPL: 9.6 (ref 7–25)
CALCIUM SERPL-MCNC: 9.8 MG/DL (ref 8.6–10.5)
CHLORIDE SERPL-SCNC: 103 MMOL/L (ref 98–107)
CO2 SERPL-SCNC: 29.3 MMOL/L (ref 22–29)
CREAT SERPL-MCNC: 1.04 MG/DL (ref 0.57–1)
GLUCOSE SERPL-MCNC: 92 MG/DL (ref 65–99)
POTASSIUM SERPL-SCNC: 4.3 MMOL/L (ref 3.5–5.2)
SODIUM SERPL-SCNC: 142 MMOL/L (ref 136–145)

## 2021-03-04 NOTE — PROGRESS NOTES
"Chief Complaint   Patient presents with   • Abdominal Pain     Pt feels a \"burning\" pain in her epigastric area that radiates up in to her throat/ears-has been going on for about 2 months   • Heartburn     Pt also c/o heartburn \"all the time\"-pt has been on Prevacid and Pepcid but those aren't helping   • Diarrhea     Pt states her BM's are pretty much liquid for the last 2 months; Pt's last colon was 2016-states that was normal but at her colon in 2007 she had 2 polyps     Subjective   HPI  Hanna Luciano is a 55 y.o. female who presents as a referral for preventative maintenance. She has no complaints of nausea or vomiting. No change in bowels. No wt loss. No BRBPR. No melena. There is *** family hx for colon cancer. No abdominal pain. There was *** Cologuard screening this year.     In addition the patient has complaints today of GERD.  [place]       Past Medical History:   Diagnosis Date   • Arthritis    • Depression    • Family history of colon cancer    • Generalized headaches    • GERD (gastroesophageal reflux disease)    • History of colon polyps    • Hyperlipidemia      Past Surgical History:   Procedure Laterality Date   • APPENDECTOMY     • BACK SURGERY  2003   • CHOLECYSTECTOMY     • EYE SURGERY Left 1965    left eyebrow   • OOPHORECTOMY Left 2000       Current Outpatient Medications:   •  acetaminophen (TYLENOL) 500 MG tablet, Take 500 mg by mouth As Needed for Mild Pain ., Disp: , Rfl:   •  bismuth subsalicylate (PEPTO BISMOL) 262 MG chewable tablet, Chew 524 mg As Needed for Indigestion., Disp: , Rfl:   •  buPROPion XL (WELLBUTRIN XL) 300 MG 24 hr tablet, TAKE 1 TABLET BY MOUTH EVERY DAY (Patient taking differently: Take 300 mg by mouth Every Morning.), Disp: 30 tablet, Rfl: 2  •  calcium carbonate (TUMS) 500 MG chewable tablet, Chew 1 tablet As Needed for Indigestion or Heartburn., Disp: , Rfl:   •  cetirizine (zyrTEC) 10 MG tablet, Take 10 mg by mouth Daily., Disp: , Rfl:   •  famotidine (PEPCID) " 20 MG tablet, TAKE 1 TABLET BY MOUTH TWO TIMES A DAY. (Patient taking differently: Take 20 mg by mouth 2 (Two) Times a Day.), Disp: 60 tablet, Rfl: 6  •  gabapentin (NEURONTIN) 800 MG tablet, TAKE 1 TABLET BY MOUTH THREE TIMES DAILY (Patient taking differently: Take 800 mg by mouth 3 (Three) Times a Day.), Disp: 90 tablet, Rfl: 0  •  lansoprazole (PREVACID) 30 MG capsule, TAKE 1 CAPSULE BY MOUTH DAILY, Disp: 30 capsule, Rfl: 5  •  methocarbamol (ROBAXIN) 750 MG tablet, Take 1 tablet by mouth 3 (Three) Times a Day As Needed for Muscle Spasms. (Patient taking differently: Take 750 mg by mouth 3 (Three) Times a Day As Needed for Muscle Spasms.), Disp: 90 tablet, Rfl: 0  •  rosuvastatin (Crestor) 20 MG tablet, Take 1 tablet by mouth Every Night., Disp: 90 tablet, Rfl: 1  •  temazepam (RESTORIL) 30 MG capsule, Take 1 capsule by mouth Daily., Disp: 30 capsule, Rfl: 0  •  sodium-potassium-magnesium sulfates (Suprep Bowel Prep Kit) 17.5-3.13-1.6 GM/177ML solution oral solution, Take 1 bottle by mouth Every 12 (Twelve) Hours. Split dose prep as directed by office instructions provided.  2 bottles = one kit., Disp: 1 mL, Rfl: 0  •  sucralfate (Carafate) 1 g tablet, Take 1 tablet by mouth 4 (Four) Times a Day., Disp: 120 tablet, Rfl: 0  Allergies   Allergen Reactions   • Sulfamethoxazole-Trimethoprim Anaphylaxis   • Atorvastatin Unknown (See Comments)     Pt unsure of allergy   • Baclofen GI Intolerance   • Diphenhydramine Other (See Comments)     Pt unsure of allergy   • Lorazepam Other (See Comments)     Pt unsure of allergy   • Morphine GI Intolerance   • Penicillins Unknown (See Comments)     Pt unsure of allergy   • Soma [Carisoprodol] Unknown (See Comments)     Pt unsure of allergy   • Valproic Acid Unknown (See Comments)     Pt unsure of allergy     Social History     Socioeconomic History   • Marital status:      Spouse name: Not on file   • Number of children: Not on file   • Years of education: Not on file  "  • Highest education level: Not on file   Tobacco Use   • Smoking status: Former Smoker     Types: Cigarettes, Electronic Cigarette     Quit date: 3/5/2000     Years since quittin.0   • Smokeless tobacco: Never Used   Substance and Sexual Activity   • Alcohol use: Not Currently     Frequency: Never   • Drug use: Yes     Types: Marijuana   • Sexual activity: Defer     Family History   Problem Relation Age of Onset   • Hyperlipidemia Other    • Heart disease Other    • Hypertension Other    • Diabetes Other    • Cancer Other    • Arthritis Other    • Mental illness Other    • Colon cancer Mother 77   • No Known Problems Father    • Colon polyps Neg Hx    • Esophageal cancer Neg Hx    • Liver cancer Neg Hx    • Liver disease Neg Hx    • Rectal cancer Neg Hx    • Stomach cancer Neg Hx        REVIEW OF SYSTEMS  General: well appearing, no fever chills or sweats, no unexplained wt loss  HEENT: no acute visual or hearing disturbances  Cardiovascular: No chest pain or palpitations  Pulmonary: No shortness of breath, coughing, wheezing or hemoptysis  : No burning, urgency, hematuria, or dysuria  Musculoskeletal: No joint pain or stiffness  Peripheral: no edema  Skin: No lesions or rashes  Neuro: No dizziness, headaches, stroke, syncope  Endocrine: No hot or cold intolerances  Hematological: No blood dyscrasias    Objective   Vitals:    21 0831   BP: 142/82   BP Location: Left arm   Patient Position: Sitting   Cuff Size: Adult   Pulse: 84   Temp: 98 °F (36.7 °C)   TempSrc: Infrared   SpO2: 98%   Weight: 62.6 kg (138 lb)   Height: 162.6 cm (64\")     Body mass index is 23.69 kg/m².    PHYSICAL EXAM  General: age appropriate well nourished well appearing, no acute distress  Head: normocephalic and atraumatic  Global assessment-supple  Neck-No JVD noted, no lymphadenopathy  Pulmonary-clear to auscultation bilaterally, normal respiratory effort  Cardiovascular-normal rate and rhythm, normal heart sounds, S1 and S2 " noted  Abdomen-soft, non tender, non distended, normal bowel sounds all 4 quadrants, no hepatosplenomegaly noted  Extremities-No clubbing cyanosis or edema  Neuro-Non focal, converses appropriately, awake, alert, oriented    Lab Results - Last 18 Months   Lab Units 02/26/21  1008 02/25/21  0828 11/21/19  1012   BUN mg/dL 10 10 15   CREATININE mg/dL 1.04* 1.02* 0.91   SODIUM mmol/L 142 139 143   POTASSIUM mmol/L 4.3 6.6* 4.3   CHLORIDE mmol/L 103 100 102   TOTAL CO2 mmol/L 29.3* 28.2 26   ALBUMIN g/dL  --  4.70 4.3   ALT (SGPT) U/L  --  14 14   AST (SGOT) U/L  --  21 15   ALK PHOS U/L  --  78 72   BILIRUBIN mg/dL  --  0.3 <0.2   SED RATE mm/hr  --   --  11       Lab Results - Last 18 Months   Lab Units 02/25/21  0828 11/21/19  1012   HEMOGLOBIN g/dL 14.4 13.1   HEMATOCRIT % 45.1 39.5   MCV fL 102.3* 99*   WBC 10*3/mm3 4.42 4.9   RDW % 11.2* 13.0   PLATELETS 10*3/mm3 226 243       No results for input(s): IRON, TIBC, LABIRON, FERRITIN, Z1OUQIS, TSH, FOLATE, DMBW29OC in the last 38167 hours.    Invalid input(s): VITB12     No results for input(s): HEPBIGMCORE, HEPBSAB, HEPBSAG, HCVSCRATIO, HEPCAB, AFP, FERRITIN, CERULOPLSM, ASAEL, MITOAB, AFPTM, CHROMOSOME in the last 50985 hours.    Invalid input(s): HVBSAB1, AFPTN    Imaging Results (Most Recent)     None        Assessment/Plan   Diagnoses and all orders for this visit:    1. Diarrhea, unspecified type (Primary)  -     Case Request; Standing  -     Case Request  -     Gastrointestinal Panel, PCR - Stool, Per Rectum; Future  -     Fecal Lactoferrin - Stool, Per Rectum; Future  -     Clostridium Difficile Toxin - , Per Rectum; Future    2. Epigastric pain  -     Case Request; Standing  -     Case Request    3. Heartburn  -     Case Request; Standing  -     Case Request    4. Other dysphagia  -     Case Request; Standing  -     Case Request    5. Belching    6. Flatulence    Other orders  -     Follow Anesthesia Guidelines / Protocol; Future  -     Obtain Informed  Consent; Future  -     Implement Anesthesia Orders Day of Procedure; Standing  -     Obtain Informed Consent; Standing  -     Verify bowel prep was successful; Standing  -     sodium-potassium-magnesium sulfates (Suprep Bowel Prep Kit) 17.5-3.13-1.6 GM/177ML solution oral solution; Take 1 bottle by mouth Every 12 (Twelve) Hours. Split dose prep as directed by office instructions provided.  2 bottles = one kit.  Dispense: 1 mL; Refill: 0  -     sucralfate (Carafate) 1 g tablet; Take 1 tablet by mouth 4 (Four) Times a Day.  Dispense: 120 tablet; Refill: 0      ESOPHAGOGASTRODUODENOSCOPY WITH ANESTHESIA (N/A), COLONOSCOPY WITH ANESTHESIA (N/A)     Anticoagulant/antiplatelet***  Phentermine***   Body mass index is 23.69 kg/m².***      All risks, benefits, alternatives, and indications of colonoscopy procedure have been discussed with the patient. Risks to include perforation of the colon requiring possible surgery or colostomy, risk of bleeding from biopsies or removal of colon tissue, possibility of missing a colon polyp or cancer, or adverse drug reaction.  Benefits to include the diagnosis and management of disease of the colon and rectum. Alternatives to include barium enema, radiographic evaluation, lab testing or no intervention. Pt verbalizes understanding and agrees.

## 2021-03-05 ENCOUNTER — OFFICE VISIT (OUTPATIENT)
Dept: GASTROENTEROLOGY | Facility: CLINIC | Age: 56
End: 2021-03-05

## 2021-03-05 VITALS
DIASTOLIC BLOOD PRESSURE: 82 MMHG | SYSTOLIC BLOOD PRESSURE: 142 MMHG | WEIGHT: 138 LBS | HEIGHT: 64 IN | HEART RATE: 84 BPM | OXYGEN SATURATION: 98 % | BODY MASS INDEX: 23.56 KG/M2 | TEMPERATURE: 98 F

## 2021-03-05 DIAGNOSIS — R13.19 OTHER DYSPHAGIA: ICD-10-CM

## 2021-03-05 DIAGNOSIS — R19.7 DIARRHEA, UNSPECIFIED TYPE: Primary | ICD-10-CM

## 2021-03-05 DIAGNOSIS — R10.13 EPIGASTRIC PAIN: ICD-10-CM

## 2021-03-05 DIAGNOSIS — R14.2 BELCHING: ICD-10-CM

## 2021-03-05 DIAGNOSIS — R12 HEARTBURN: ICD-10-CM

## 2021-03-05 DIAGNOSIS — R14.3 FLATULENCE: ICD-10-CM

## 2021-03-05 PROCEDURE — 99214 OFFICE O/P EST MOD 30 MIN: CPT | Performed by: NURSE PRACTITIONER

## 2021-03-05 RX ORDER — CALCIUM CARBONATE 200(500)MG
1 TABLET,CHEWABLE ORAL AS NEEDED
COMMUNITY
End: 2021-03-10

## 2021-03-05 RX ORDER — CETIRIZINE HYDROCHLORIDE 10 MG/1
10 TABLET ORAL DAILY
COMMUNITY
End: 2021-03-10

## 2021-03-05 RX ORDER — SUCRALFATE 1 G/1
1 TABLET ORAL 4 TIMES DAILY
Qty: 120 TABLET | Refills: 0 | Status: SHIPPED | OUTPATIENT
Start: 2021-03-05 | End: 2021-04-06 | Stop reason: SDUPTHER

## 2021-03-05 RX ORDER — SODIUM, POTASSIUM,MAG SULFATES 17.5-3.13G
1 SOLUTION, RECONSTITUTED, ORAL ORAL EVERY 12 HOURS
Qty: 1 ML | Refills: 0 | Status: SHIPPED | OUTPATIENT
Start: 2021-03-05 | End: 2021-03-10

## 2021-03-05 RX ORDER — BISMUTH SUBSALICYLATE 262 MG/1
524 TABLET, CHEWABLE ORAL AS NEEDED
COMMUNITY
End: 2021-03-10

## 2021-03-05 RX ORDER — ACETAMINOPHEN 500 MG
500 TABLET ORAL AS NEEDED
COMMUNITY
End: 2023-03-14

## 2021-03-05 NOTE — PROGRESS NOTES
"Chief Complaint:   Chief Complaint   Patient presents with   • Abdominal Pain     Pt feels a \"burning\" pain in her epigastric area that radiates up in to her throat/ears-has been going on for about 2 months   • Heartburn     Pt also c/o heartburn \"all the time\"-pt has been on Prevacid and Pepcid but those aren't helping   • Diarrhea     Pt states her BM's are pretty much liquid for the last 2 months; Pt's last colon was 2016-states that was normal but at her colon in 2007 she had 2 polyps         Patient ID: Hanna Luciano is a 55 y.o. female     History of Present Illness: This is a very pleasant 55-year-old female who I have had an office visit through  of the name Valerio #[id number] sign language as the patient is deaf.    The patient tells me through the  that she has been having very bad epigastric pain and burning that radiates up into her throat.  She states she has noted heartburn \"all of the time.\"  States this has been going on for approximately 2 months.  She is taking both Prevacid and Pepcid with no relief.  She has noted difficulty swallowing as well as belching.  She states \"sometimes food and pills feel like they get hung.\"  The patient also states that she has been having diarrhea along with flatulence.  She states the diarrhea has been pretty much liquid for the past 2 months.  States she has had 2 colonoscopies in the past one was in [place] and one was in [place].  She states on one colonoscopy she was told she had polyps 2007 however her last colonoscopy was normal.  She denies any chronic NSAID use.  He states that she has been taking Pepto-Bismol but not on a daily basis.  There is no known family history of colon cancer or colon polyps.    The patient denies any nausea, vomiting,  or hematemesis.  The patient denies any fever or chills.  Denies any melena or hematochezia.  Denies any unintentional weight loss or loss of appetite.      Past Medical History:   Diagnosis " Date   • Arthritis    • Depression    • Family history of colon cancer    • Generalized headaches    • GERD (gastroesophageal reflux disease)    • History of colon polyps    • Hyperlipidemia        Past Surgical History:   Procedure Laterality Date   • APPENDECTOMY     • BACK SURGERY  2003   • CHOLECYSTECTOMY     • EYE SURGERY Left 1965    left eyebrow   • OOPHORECTOMY Left 2000         Current Outpatient Medications:   •  acetaminophen (TYLENOL) 500 MG tablet, Take 500 mg by mouth As Needed for Mild Pain ., Disp: , Rfl:   •  bismuth subsalicylate (PEPTO BISMOL) 262 MG chewable tablet, Chew 524 mg As Needed for Indigestion., Disp: , Rfl:   •  buPROPion XL (WELLBUTRIN XL) 300 MG 24 hr tablet, TAKE 1 TABLET BY MOUTH EVERY DAY (Patient taking differently: Take 300 mg by mouth Every Morning.), Disp: 30 tablet, Rfl: 2  •  calcium carbonate (TUMS) 500 MG chewable tablet, Chew 1 tablet As Needed for Indigestion or Heartburn., Disp: , Rfl:   •  cetirizine (zyrTEC) 10 MG tablet, Take 10 mg by mouth Daily., Disp: , Rfl:   •  famotidine (PEPCID) 20 MG tablet, TAKE 1 TABLET BY MOUTH TWO TIMES A DAY. (Patient taking differently: Take 20 mg by mouth 2 (Two) Times a Day.), Disp: 60 tablet, Rfl: 6  •  gabapentin (NEURONTIN) 800 MG tablet, TAKE 1 TABLET BY MOUTH THREE TIMES DAILY (Patient taking differently: Take 800 mg by mouth 3 (Three) Times a Day.), Disp: 90 tablet, Rfl: 0  •  lansoprazole (PREVACID) 30 MG capsule, TAKE 1 CAPSULE BY MOUTH DAILY, Disp: 30 capsule, Rfl: 5  •  methocarbamol (ROBAXIN) 750 MG tablet, Take 1 tablet by mouth 3 (Three) Times a Day As Needed for Muscle Spasms. (Patient taking differently: Take 750 mg by mouth 3 (Three) Times a Day As Needed for Muscle Spasms.), Disp: 90 tablet, Rfl: 0  •  rosuvastatin (Crestor) 20 MG tablet, Take 1 tablet by mouth Every Night., Disp: 90 tablet, Rfl: 1  •  temazepam (RESTORIL) 30 MG capsule, Take 1 capsule by mouth Daily., Disp: 30 capsule, Rfl: 0  •   sodium-potassium-magnesium sulfates (Suprep Bowel Prep Kit) 17.5-3.13-1.6 GM/177ML solution oral solution, Take 1 bottle by mouth Every 12 (Twelve) Hours. Split dose prep as directed by office instructions provided.  2 bottles = one kit., Disp: 1 mL, Rfl: 0  •  sucralfate (Carafate) 1 g tablet, Take 1 tablet by mouth 4 (Four) Times a Day., Disp: 120 tablet, Rfl: 0    Allergies   Allergen Reactions   • Sulfamethoxazole-Trimethoprim Anaphylaxis   • Atorvastatin Unknown (See Comments)     Pt unsure of allergy   • Baclofen GI Intolerance   • Diphenhydramine Other (See Comments)     Pt unsure of allergy   • Lorazepam Other (See Comments)     Pt unsure of allergy   • Morphine GI Intolerance   • Penicillins Unknown (See Comments)     Pt unsure of allergy   • Soma [Carisoprodol] Unknown (See Comments)     Pt unsure of allergy   • Valproic Acid Unknown (See Comments)     Pt unsure of allergy       Social History     Socioeconomic History   • Marital status:      Spouse name: Not on file   • Number of children: Not on file   • Years of education: Not on file   • Highest education level: Not on file   Tobacco Use   • Smoking status: Former Smoker     Types: Cigarettes, Electronic Cigarette     Quit date: 3/5/2000     Years since quittin.0   • Smokeless tobacco: Never Used   Substance and Sexual Activity   • Alcohol use: Not Currently     Frequency: Never   • Drug use: Yes     Types: Marijuana   • Sexual activity: Defer       Family History   Problem Relation Age of Onset   • Hyperlipidemia Other    • Heart disease Other    • Hypertension Other    • Diabetes Other    • Cancer Other    • Arthritis Other    • Mental illness Other    • Colon cancer Mother 77   • No Known Problems Father    • Colon polyps Neg Hx    • Esophageal cancer Neg Hx    • Liver cancer Neg Hx    • Liver disease Neg Hx    • Rectal cancer Neg Hx    • Stomach cancer Neg Hx        Vitals:    21 0831   BP: 142/82   BP Location: Left arm  "  Patient Position: Sitting   Cuff Size: Adult   Pulse: 84   Temp: 98 °F (36.7 °C)   TempSrc: Infrared   SpO2: 98%   Weight: 62.6 kg (138 lb)   Height: 162.6 cm (64\")       Review of Systems:    General:    Present -feeling well   Skin:    Not Present-Rash   HEENT:     Not Present-Acute visual changes or Acute hearing changes   Neck :    Not Present- swollen glands   Genitourinary:      Not Present- burning, frequency, urgency hematuria, dysuria,   Cardiovascular:   Not Present-chest pain, palpitations, or pressure   Respiratory:   Not Present- shortness of breath or cough   Gastrointestinal:  Musculoskeletal:  Neurological:  Psychiatric:   Present as mentioned in the HP    Not Present. Recent gait disturbances.    Not Present-Seizures and weakness in extremities.    Not Present- Anxiety or Depression.       Physical Exam:    General Appearance:    Alert, cooperative, in no acute distress   Psych:    Mood appropriate    Eyes:          conjunctivae and sclerae normal, no   icterus, no pallor   ENMT:    Ears appear intact with no abnormalities noted oral mucosa moist   Neck:   No adenopathy, supple, trachea midline, no thyromegaly, no   carotid bruit, no JVD    Cardiovascular:    Regular rhythm and normal rate, normal S1 and S2, no            murmur, no gallop, no rub, no click   Gastrointestinal:     Inspection normal.  Normal bowel sounds, no masses, no organomegaly, soft round non-tender, non-distended, no guarding, no rebound or tenderness. No hepatosplenomegaly.   Skin:   No bleeding, bruising or rash   Neurologic:   nonfocal       Lab Results - Last 18 Months   Lab Units 02/26/21  1008 02/25/21  0828 11/21/19  1012   BUN mg/dL 10 10 15   CREATININE mg/dL 1.04* 1.02* 0.91   SODIUM mmol/L 142 139 143   POTASSIUM mmol/L 4.3 6.6* 4.3   CHLORIDE mmol/L 103 100 102   TOTAL CO2 mmol/L 29.3* 28.2 26   ALBUMIN g/dL  --  4.70 4.3   ALT (SGPT) U/L  --  14 14   AST (SGOT) U/L  --  21 15   ALK PHOS U/L  --  78 72   BILIRUBIN " mg/dL  --  0.3 <0.2   SED RATE mm/hr  --   --  11       Lab Results - Last 18 Months   Lab Units 02/25/21  0828 11/21/19  1012   HEMOGLOBIN g/dL 14.4 13.1   HEMATOCRIT % 45.1 39.5   MCV fL 102.3* 99*   WBC 10*3/mm3 4.42 4.9   RDW % 11.2* 13.0   PLATELETS 10*3/mm3 226 243       Body mass index is 23.69 kg/m². Patient's Body mass index is 23.69 kg/m². BMI is within normal parameters. No follow-up required..    Assessment and Plan:  Assessment/Plan   Diagnoses and all orders for this visit:    1. Diarrhea, unspecified type (Primary)  -     Case Request; Standing  -     Case Request  -     Gastrointestinal Panel, PCR - Stool, Per Rectum; Future  -     Fecal Lactoferrin - Stool, Per Rectum; Future  -     Clostridium Difficile Toxin - , Per Rectum; Future    2. Epigastric pain  -     Case Request; Standing  -     Case Request    3. Heartburn  -     Case Request; Standing  -     Case Request    4. Other dysphagia  -     Case Request; Standing  -     Case Request    5. Belching    6. Flatulence    Other orders  -     Follow Anesthesia Guidelines / Protocol; Future  -     Obtain Informed Consent; Future  -     Implement Anesthesia Orders Day of Procedure; Standing  -     Obtain Informed Consent; Standing  -     Verify bowel prep was successful; Standing  -     sodium-potassium-magnesium sulfates (Suprep Bowel Prep Kit) 17.5-3.13-1.6 GM/177ML solution oral solution; Take 1 bottle by mouth Every 12 (Twelve) Hours. Split dose prep as directed by office instructions provided.  2 bottles = one kit.  Dispense: 1 mL; Refill: 0  -     sucralfate (Carafate) 1 g tablet; Take 1 tablet by mouth 4 (Four) Times a Day.  Dispense: 120 tablet; Refill: 0        Schedule patient for both EGD and colonoscopy.  Continue on Prevacid.  Will initiate Carafate to be crushed mixed in water to make a slurry and drink 4 times a day.  Will check gastrointestinal panel for pathogens, C. difficile fecal leukocytes.  The patient noted she had labs recently  done by her PCP I will request those from their office as those were not sent to me.     There are no Patient Instructions on file for this visit.    Next follow-up appointment    The risks, benefits, and alternatives of endoscopy were reviewed with the patient today.  Risks including perforation, with or without dilation, possibly requiring surgery.  Additional risks include risk of bleeding.  There is also the risk of a drug reaction or problems with anesthesia.  This will be discussed with the further by the anesthesia team on the day of the procedure. The benefits include the diagnosis and management of disease of the upper digestive tract.  Alternatives to endoscopy include upper GI series, laboratory testing, radiographic evaluation, or no intervention.  The patient verbalizes understanding and agrees.    The risks, benefits, and alternatives of colonoscopy were reviewed with the patient today.  Risks including perforation of the colon possibly requiring surgery or colostomy.  Additional risks include risk of bleeding from biopsies or removal of colon tissue.  There is also the risk of a drug reaction or problems with anesthesia.  This will be discussed with the further by the anesthesia team on the day of the procedure.  Lastly there is a possibility of missing a colon polyp or cancer.  The benefits include the diagnosis and management of disease of the colon and rectum.  Alternatives to colonoscopy include barium enema, laboratory testing, radiographic evaluation, or no intervention.  The patient verbalizes understanding and agrees.      EMR Dragon/Transcription disclaimer:  Much of this encounter note is an electronic transcription/translation of spoken language to printed text. The electronic translation of spoken language may permit erroneous, or at times, nonsensical words or phrases to be inadvertently transcribed; although I have reviewed the note for such errors, some may still exist.

## 2021-03-10 ENCOUNTER — OFFICE VISIT (OUTPATIENT)
Dept: OBSTETRICS AND GYNECOLOGY | Facility: CLINIC | Age: 56
End: 2021-03-10

## 2021-03-10 ENCOUNTER — LAB (OUTPATIENT)
Dept: LAB | Facility: HOSPITAL | Age: 56
End: 2021-03-10

## 2021-03-10 VITALS
HEIGHT: 64 IN | DIASTOLIC BLOOD PRESSURE: 90 MMHG | WEIGHT: 136 LBS | SYSTOLIC BLOOD PRESSURE: 132 MMHG | BODY MASS INDEX: 23.22 KG/M2

## 2021-03-10 DIAGNOSIS — R19.7 DIARRHEA, UNSPECIFIED TYPE: ICD-10-CM

## 2021-03-10 DIAGNOSIS — N95.2 ATROPHIC VAGINITIS: Primary | ICD-10-CM

## 2021-03-10 DIAGNOSIS — B37.31 YEAST VAGINITIS: ICD-10-CM

## 2021-03-10 LAB
ADV 40+41 DNA STL QL NAA+NON-PROBE: NOT DETECTED
ASTRO TYP 1-8 RNA STL QL NAA+NON-PROBE: NOT DETECTED
C CAYETANENSIS DNA STL QL NAA+NON-PROBE: NOT DETECTED
C COLI+JEJ+UPSA DNA STL QL NAA+NON-PROBE: NOT DETECTED
C DIFF TOX GENS STL QL NAA+PROBE: NEGATIVE
CRYPTOSP DNA STL QL NAA+NON-PROBE: NOT DETECTED
EAEC PAA PLAS AGGR+AATA ST NAA+NON-PRB: NOT DETECTED
EAEC PAA PLAS AGGR+AATA ST NAA+NON-PRB: NOT DETECTED
EC STX1+STX2 GENES STL QL NAA+NON-PROBE: NOT DETECTED
EPEC EAE GENE STL QL NAA+NON-PROBE: NOT DETECTED
ETEC LTA+ST1A+ST1B TOX ST NAA+NON-PROBE: NOT DETECTED
G LAMBLIA DNA STL QL NAA+NON-PROBE: NOT DETECTED
NOROVIRUS GI+II RNA STL QL NAA+NON-PROBE: NOT DETECTED
P SHIGELLOIDES DNA STL QL NAA+NON-PROBE: NOT DETECTED
RVA RNA STL QL NAA+NON-PROBE: NOT DETECTED
S ENT+BONG DNA STL QL NAA+NON-PROBE: NOT DETECTED
SAPO I+II+IV+V RNA STL QL NAA+NON-PROBE: NOT DETECTED
SHIGELLA SP+EIEC IPAH ST NAA+NON-PROBE: NOT DETECTED
V CHOL+PARA+VUL DNA STL QL NAA+NON-PROBE: NOT DETECTED
V CHOLERAE DNA STL QL NAA+NON-PROBE: NOT DETECTED
WET PREP GENITAL: ABNORMAL
Y ENTEROCOL DNA STL QL NAA+NON-PROBE: NOT DETECTED

## 2021-03-10 PROCEDURE — 83630 LACTOFERRIN FECAL (QUAL): CPT

## 2021-03-10 PROCEDURE — 87210 SMEAR WET MOUNT SALINE/INK: CPT | Performed by: NURSE PRACTITIONER

## 2021-03-10 PROCEDURE — 87493 C DIFF AMPLIFIED PROBE: CPT

## 2021-03-10 PROCEDURE — 99213 OFFICE O/P EST LOW 20 MIN: CPT | Performed by: NURSE PRACTITIONER

## 2021-03-10 PROCEDURE — 0097U HC BIOFIRE FILMARRAY GI PANEL: CPT

## 2021-03-10 RX ORDER — ESTRADIOL 0.1 MG/G
2 CREAM VAGINAL EVERY OTHER DAY
COMMUNITY
End: 2021-07-14

## 2021-03-10 RX ORDER — FLUTICASONE PROPIONATE 50 MCG
2 SPRAY, SUSPENSION (ML) NASAL DAILY
COMMUNITY
End: 2021-05-28 | Stop reason: SDUPTHER

## 2021-03-10 RX ORDER — AZELASTINE 1 MG/ML
2 SPRAY, METERED NASAL 2 TIMES DAILY
COMMUNITY
End: 2021-07-12

## 2021-03-11 ENCOUNTER — TELEPHONE (OUTPATIENT)
Dept: GASTROENTEROLOGY | Facility: CLINIC | Age: 56
End: 2021-03-11

## 2021-03-11 ENCOUNTER — TELEPHONE (OUTPATIENT)
Dept: INTERNAL MEDICINE | Facility: CLINIC | Age: 56
End: 2021-03-11

## 2021-03-11 DIAGNOSIS — Z01.818 PREOPERATIVE TESTING: Primary | ICD-10-CM

## 2021-03-11 LAB — LACTOFERRIN STL QL LA: NEGATIVE

## 2021-03-11 NOTE — TELEPHONE ENCOUNTER
PATIENT TRIED TO REFILL SCRIPT    temazepam (RESTORIL) 30 MG capsule      AND WAS INFORMED BY PHARMACY THAT IT CANNOT BE FILLED UNTIL 3/13. PATIENT IS CONFUSED AND SEEKING ADVICE. SHE STATES SHE PICKED IT UP ON 2/11 LAST MONTH    PLEASE CALL AND ADVISE: [phone]

## 2021-03-11 NOTE — TELEPHONE ENCOUNTER
----- Message from YARITZA Castillo sent at 3/11/2021  1:44 PM CST -----  Please notify patient that gastrointestinal panel was negative for pathogens and C. difficile negative.

## 2021-03-11 NOTE — TELEPHONE ENCOUNTER
Spoke with Gene at [facility].  Medication has been filled and out for delivery.

## 2021-03-11 NOTE — TELEPHONE ENCOUNTER
Pt called Jessika earlier with questions about her procedure and Jessika gave her results while they were on the phone.

## 2021-03-12 ENCOUNTER — CLINICAL SUPPORT (OUTPATIENT)
Dept: FAMILY MEDICINE CLINIC | Facility: CLINIC | Age: 56
End: 2021-03-12

## 2021-03-12 VITALS — TEMPERATURE: 98.6 F

## 2021-03-12 DIAGNOSIS — Z51.81 MEDICATION MONITORING ENCOUNTER: Primary | ICD-10-CM

## 2021-03-12 PROCEDURE — 99211 OFF/OP EST MAY X REQ PHY/QHP: CPT | Performed by: NURSE PRACTITIONER

## 2021-03-14 LAB — GABAPENTIN UR-MCNC: 286.7 UG/ML

## 2021-03-16 ENCOUNTER — TELEPHONE (OUTPATIENT)
Dept: FAMILY MEDICINE CLINIC | Facility: CLINIC | Age: 56
End: 2021-03-16

## 2021-03-16 NOTE — TELEPHONE ENCOUNTER
[facility]   ANTHEM  [facility]    PT WAS INQUIRING MEDICARE ADVANTAGE  INSURANCE PLANS [employer] ACCEPTED-ADVISED ANY OF ABOVE WERE OK. PT LOOKING AT OPTIONS D/T TRANSPORTATION NEEDS AND COST.

## 2021-03-17 LAB — DRUGS UR: NORMAL

## 2021-03-18 DIAGNOSIS — M51.37 DEGENERATION OF LUMBOSACRAL INTERVERTEBRAL DISC: Primary | ICD-10-CM

## 2021-03-18 RX ORDER — ROSUVASTATIN CALCIUM 20 MG/1
20 TABLET, COATED ORAL NIGHTLY
Qty: 90 TABLET | Refills: 2 | Status: SHIPPED | OUTPATIENT
Start: 2021-03-18 | End: 2022-01-12

## 2021-03-19 ENCOUNTER — TRANSCRIBE ORDERS (OUTPATIENT)
Dept: LAB | Facility: HOSPITAL | Age: 56
End: 2021-03-19

## 2021-03-19 DIAGNOSIS — Z01.818 PRE-OP TESTING: Primary | ICD-10-CM

## 2021-03-19 DIAGNOSIS — K21.00 GASTROESOPHAGEAL REFLUX DISEASE WITH ESOPHAGITIS, UNSPECIFIED WHETHER HEMORRHAGE: Primary | ICD-10-CM

## 2021-03-19 RX ORDER — SUCRALFATE 1 G/1
1 TABLET ORAL 4 TIMES DAILY
Qty: 120 TABLET | Refills: 1 | Status: CANCELLED | OUTPATIENT
Start: 2021-03-19

## 2021-03-19 NOTE — TELEPHONE ENCOUNTER
Pt stated that there is no  that is no ride or  for her appointment with GI on 03/25/2021 and would like a refill on her medication until she can get in for an appointment to [facility].

## 2021-03-22 ENCOUNTER — TELEPHONE (OUTPATIENT)
Dept: FAMILY MEDICINE CLINIC | Facility: CLINIC | Age: 56
End: 2021-03-22

## 2021-03-22 ENCOUNTER — TELEPHONE (OUTPATIENT)
Dept: GASTROENTEROLOGY | Facility: CLINIC | Age: 56
End: 2021-03-22

## 2021-03-22 NOTE — TELEPHONE ENCOUNTER
GI MEDICATION IS WORKING AND RAFAEL MCGHEE PRESCRIBED CARAFATE AND THIS IS HELPING HER.  IS CURRENTLY TAKING PREVACID 30MG (1) QD, PEPCID 20MG (1) QD AND CARAFATE 1MG (1) QID.  INQUIRING IF YOU WOULD BE ABLE TO TAKE OVER REFILLS UNTIL GETS TRANSPORTATION SITUATION FIGURED OUT.

## 2021-03-22 NOTE — TELEPHONE ENCOUNTER
Pt is in need of an in-person  for her upcoming procedures. I heard from Radha today that does all the scheduling for that-she tells me they have no one available that date for in-person interpreting. She suggested we reschedule pt's appt or use the Ipad.     I called to speak to pt about it-before I could even explain any of that to her she tells me she has no ride for Thursday and needs to r/s. I will have Jessika cancel her off the schedule. I did advise that when she call back to r/s we would need to check with Radha first to find out what dates are available for an  before actually scheduling her. That way we can make sure it is a good date for her, the , and a day Dr. Nguyen is doing procedures. Jessika is aware of all of this and I will help her get all this arranged at that time if needed. I gave pt Jessika's number to call back when she is ready to r/s and I have placed her in recall for 2 months in case we don't hear back from her.

## 2021-03-23 NOTE — TELEPHONE ENCOUNTER
Tiffani,  Please contact patient and have her get refills from the provider that prescribed the Carafate  RAFAEL ARTEAGA>  Thank you.  YARITZA Hogan

## 2021-03-25 ENCOUNTER — TELEPHONE (OUTPATIENT)
Dept: INTERNAL MEDICINE | Facility: CLINIC | Age: 56
End: 2021-03-25

## 2021-03-25 NOTE — TELEPHONE ENCOUNTER
Caller: Chivo Luciano    Relationship: Self    Best call back number: [phone]    What is the best time to reach you: ANYTIME    Who are you requesting to speak with (clinical staff, provider,  specific staff member): NURSE    Do you know the name of the person who called: CHIVO    What was the call regarding: PATIENT HAS FALLEN A COUPLE OF TIMES AND SHE WOULD LIKE TO DISCUSS IT WITH THE NURSE    Do you require a callback: YES

## 2021-03-26 ENCOUNTER — TELEPHONE (OUTPATIENT)
Dept: FAMILY MEDICINE CLINIC | Facility: CLINIC | Age: 56
End: 2021-03-26

## 2021-03-26 NOTE — TELEPHONE ENCOUNTER
HAS FALLEN A FEW TIMES-JUST WANTED TO MENTION-SALOME NOTICE LEFT LEG WAS WEAK--FELL & LOST BALANCE LANDED ON HIP AND LEG-TRYING TO GET TRANSPORTATION TO [place] WHEN SHE NEEDS PROOF TO FILL OUT FOR MEDICAID PURPOSES.      WHEN EXERCISING LEFT LEG AND HIP.     GI & COLON-CANCELLED D/T TRANSPORTATION.  BELOW POVERTY LINE-CANNOT HAVE TRANSPORTATION.      ONCE IMAGING IS COMPLETED WITH [facility] COMPARISON OF MRI'S.      HAS FRUSTRATION WITH TRYING TO GET TRANSPORTATION.

## 2021-03-26 NOTE — TELEPHONE ENCOUNTER
Noted. See if she would like us to get her set up for her scopes with Dr. Mccracken here in [place].   Am I needing to do something regarding proof?  I'm not following...

## 2021-03-31 ENCOUNTER — TELEPHONE (OUTPATIENT)
Dept: GASTROENTEROLOGY | Facility: CLINIC | Age: 56
End: 2021-03-31

## 2021-04-01 NOTE — TELEPHONE ENCOUNTER
I called and sw Radha on 3/31/21 and she states that she will see if she has an  available. I called Radha today because she had not returned my call. She only had someone available on 4/8/&4/9. Dr. Nguyen is not scoping on those days. I called pt and she is ok with the ipad . She is scheduled for 4/28/21 @9:30 am. I have mailed a new set of instructions with new time and date.

## 2021-04-05 RX ORDER — SUCRALFATE 1 G/1
TABLET ORAL
Qty: 120 TABLET | Refills: 0 | OUTPATIENT
Start: 2021-04-05

## 2021-04-06 RX ORDER — AZELASTINE HCL 205.5 UG/1
SPRAY NASAL
COMMUNITY
Start: 2021-03-23 | End: 2021-05-10 | Stop reason: SDUPTHER

## 2021-04-06 RX ORDER — SUCRALFATE 1 G/1
1 TABLET ORAL 4 TIMES DAILY
Qty: 120 TABLET | Refills: 11 | Status: SHIPPED | OUTPATIENT
Start: 2021-04-06 | End: 2021-04-28 | Stop reason: HOSPADM

## 2021-04-06 NOTE — TELEPHONE ENCOUNTER
Caller: Luciano Hanna Briones    Relationship: Self    Best call back number: [phone]     Medication needed:   Requested Prescriptions     Pending Prescriptions Disp Refills   • sucralfate (Carafate) 1 g tablet 120 tablet 0     Sig: Take 1 tablet by mouth 4 (Four) Times a Day.       When do you need the refill by: 04/06/2021    What additional details did the patient provide when requesting the medication: ALMOST OUT    Does the patient have less than a 3 day supply:  [x] Yes  [] No    What is the patient's preferred pharmacy: [facility] [address] [phone] [facility] [phone]

## 2021-04-07 ENCOUNTER — TELEPHONE (OUTPATIENT)
Dept: FAMILY MEDICINE CLINIC | Facility: CLINIC | Age: 56
End: 2021-04-07

## 2021-04-12 ENCOUNTER — TELEPHONE (OUTPATIENT)
Dept: INTERNAL MEDICINE | Facility: CLINIC | Age: 56
End: 2021-04-12

## 2021-04-12 DIAGNOSIS — M51.37 DEGENERATION OF LUMBOSACRAL INTERVERTEBRAL DISC: Primary | ICD-10-CM

## 2021-04-12 DIAGNOSIS — F51.01 PRIMARY INSOMNIA: ICD-10-CM

## 2021-04-12 NOTE — TELEPHONE ENCOUNTER
Caller: Hanna Luciano    Relationship: Self    Best call back number: [phone]    What orders are you requesting (i.e. lab or imaging):   REFERRAL FOR SURGERY FOR HIP/BACK AND ALSO FOR MRI.     In what timeframe would the patient need to come in:   ASAP PATIENT ADVISED SHE IS IN EXTREME PAIN    Where will you receive your lab/imaging services:   [facility]    Additional notes:   PATIENT REQUESTED TO SPEAK WITH NOLAN TO SEE ABOUT GETTING REFERRALS DONE.

## 2021-04-13 RX ORDER — TEMAZEPAM 30 MG/1
30 CAPSULE ORAL DAILY
Qty: 30 CAPSULE | Refills: 0 | Status: SHIPPED | OUTPATIENT
Start: 2021-04-13 | End: 2021-05-10 | Stop reason: SDUPTHER

## 2021-04-16 ENCOUNTER — TELEPHONE (OUTPATIENT)
Dept: INTERNAL MEDICINE | Facility: CLINIC | Age: 56
End: 2021-04-16

## 2021-04-16 DIAGNOSIS — G89.29 CHRONIC LEFT-SIDED LOW BACK PAIN WITH LEFT-SIDED SCIATICA: Primary | ICD-10-CM

## 2021-04-16 DIAGNOSIS — M54.42 CHRONIC LEFT-SIDED LOW BACK PAIN WITH LEFT-SIDED SCIATICA: Primary | ICD-10-CM

## 2021-04-16 NOTE — TELEPHONE ENCOUNTER
So, the MRI shows inflammation at L4-L5. In 2017 also had inflammation at L3-L4 that was not noted on this MRI. There are no herniated or bulging discs and nothing that appears to be causing cord compression. I would recommend that she see a neurosurgeon to discuss her symptoms.  Would she like referral?

## 2021-04-16 NOTE — TELEPHONE ENCOUNTER
Caller: Hanna Luciano    Relationship: Self    Best call back number: [phone]    What is the best time to reach you: ASAP     WHEN BACK SURGERY HAPPENED, PATIENT HAD INFUSION. WHEN SH WENT TO ANOTHER DOCTORS OFFICER THEY TOLD HER THAT TWO NEW PLACES WERE THE ONES BOTHERING HER. SHE WOULD LIKE TO MAKE SURE THAT THIS IS CORRECT AND THEY ARE NEW.

## 2021-04-16 NOTE — TELEPHONE ENCOUNTER
Patient advised MRI results through .  Patient noted understanding.  Patient is agreeable to referral.

## 2021-04-19 ENCOUNTER — TELEPHONE (OUTPATIENT)
Dept: INTERNAL MEDICINE | Facility: CLINIC | Age: 56
End: 2021-04-19

## 2021-04-19 DIAGNOSIS — M54.42 CHRONIC BILATERAL LOW BACK PAIN WITH BILATERAL SCIATICA: Primary | ICD-10-CM

## 2021-04-19 DIAGNOSIS — G89.29 CHRONIC BILATERAL LOW BACK PAIN WITH BILATERAL SCIATICA: Primary | ICD-10-CM

## 2021-04-19 DIAGNOSIS — M54.41 CHRONIC BILATERAL LOW BACK PAIN WITH BILATERAL SCIATICA: Primary | ICD-10-CM

## 2021-04-19 RX ORDER — IBUPROFEN 800 MG/1
800 TABLET ORAL EVERY 8 HOURS PRN
Qty: 60 TABLET | Refills: 0 | Status: SHIPPED | OUTPATIENT
Start: 2021-04-19 | End: 2021-05-10 | Stop reason: SDUPTHER

## 2021-04-19 NOTE — TELEPHONE ENCOUNTER
Spoke with patient.  States was prescribed by Dr. Mook Webster.  Requesting refill.  Patient states she is in a great deal of pain.

## 2021-04-19 NOTE — TELEPHONE ENCOUNTER
Please advise that I will do 800 mg ibuprofen that she may take in addition to her gabapentin and robaxin.  I  would also like to place a referral to pain management. I will continue to order her gabapentin and robaxin, but anything stronger pain management will need to do.

## 2021-04-19 NOTE — TELEPHONE ENCOUNTER
PATIENT HAS CALLED TO REQUEST TYLENOL 3 - MENTIONED SHE IS IN A LOT OF PAIN.     [facility] - [facility] [address] - [phone]  - [phone]   [phone]    PLEASE CALL AND ADVISE:   [phone]

## 2021-04-20 DIAGNOSIS — M51.37 DEGENERATION OF LUMBOSACRAL INTERVERTEBRAL DISC: ICD-10-CM

## 2021-04-20 RX ORDER — METHOCARBAMOL 750 MG/1
TABLET, FILM COATED ORAL
Qty: 90 TABLET | Refills: 0 | OUTPATIENT
Start: 2021-04-20

## 2021-04-20 RX ORDER — METHOCARBAMOL 750 MG/1
750 TABLET, FILM COATED ORAL 3 TIMES DAILY PRN
Qty: 90 TABLET | Refills: 0 | Status: SHIPPED | OUTPATIENT
Start: 2021-04-20 | End: 2021-05-10 | Stop reason: SDUPTHER

## 2021-04-20 NOTE — TELEPHONE ENCOUNTER
MED REFILL REQUEST-PT STATES [facility] HAD SENT A REQUEST-ADVISED PT WE HAD NOT RECEIVED ONE YET.      ROBAXIN 750MG (1) TID PRN MUSCLE SPASMS    [facility]

## 2021-04-23 ENCOUNTER — TRANSCRIBE ORDERS (OUTPATIENT)
Dept: GASTROENTEROLOGY | Facility: CLINIC | Age: 56
End: 2021-04-23

## 2021-04-23 DIAGNOSIS — Z01.818 PREOPERATIVE TESTING: Primary | ICD-10-CM

## 2021-04-26 ENCOUNTER — TELEPHONE (OUTPATIENT)
Dept: OBSTETRICS AND GYNECOLOGY | Facility: CLINIC | Age: 56
End: 2021-04-26

## 2021-04-26 RX ORDER — BUPROPION HYDROCHLORIDE 300 MG/1
TABLET ORAL
Qty: 30 TABLET | Refills: 1 | Status: SHIPPED | OUTPATIENT
Start: 2021-04-26 | End: 2021-06-25

## 2021-04-26 NOTE — TELEPHONE ENCOUNTER
Pt called (with ) and stated she does not want Rafiq Jones to have anything to do with her medical information.  She wants only her sister Isa Valdovinos [phone] to have this information.  She does not have transportation to be able to update her verbal agreement.

## 2021-04-28 ENCOUNTER — ANESTHESIA EVENT (OUTPATIENT)
Dept: GASTROENTEROLOGY | Facility: HOSPITAL | Age: 56
End: 2021-04-28

## 2021-04-28 ENCOUNTER — HOSPITAL ENCOUNTER (OUTPATIENT)
Facility: HOSPITAL | Age: 56
Setting detail: HOSPITAL OUTPATIENT SURGERY
Discharge: HOME | End: 2021-04-28
Attending: INTERNAL MEDICINE | Admitting: INTERNAL MEDICINE

## 2021-04-28 ENCOUNTER — ANESTHESIA (OUTPATIENT)
Dept: GASTROENTEROLOGY | Facility: HOSPITAL | Age: 56
End: 2021-04-28

## 2021-04-28 VITALS
HEIGHT: 64 IN | TEMPERATURE: 97.2 F | BODY MASS INDEX: 23.7 KG/M2 | SYSTOLIC BLOOD PRESSURE: 100 MMHG | DIASTOLIC BLOOD PRESSURE: 72 MMHG | WEIGHT: 138.8 LBS | HEART RATE: 66 BPM | RESPIRATION RATE: 14 BRPM | OXYGEN SATURATION: 100 %

## 2021-04-28 DIAGNOSIS — R10.13 EPIGASTRIC PAIN: ICD-10-CM

## 2021-04-28 DIAGNOSIS — R19.7 DIARRHEA, UNSPECIFIED TYPE: ICD-10-CM

## 2021-04-28 DIAGNOSIS — R13.19 OTHER DYSPHAGIA: ICD-10-CM

## 2021-04-28 DIAGNOSIS — R12 HEARTBURN: ICD-10-CM

## 2021-04-28 PROCEDURE — 43239 EGD BIOPSY SINGLE/MULTIPLE: CPT | Performed by: INTERNAL MEDICINE

## 2021-04-28 PROCEDURE — 88305 TISSUE EXAM BY PATHOLOGIST: CPT | Performed by: INTERNAL MEDICINE

## 2021-04-28 PROCEDURE — 45380 COLONOSCOPY AND BIOPSY: CPT | Performed by: INTERNAL MEDICINE

## 2021-04-28 PROCEDURE — 25010000002 PROPOFOL 10 MG/ML EMULSION: Performed by: NURSE ANESTHETIST, CERTIFIED REGISTERED

## 2021-04-28 PROCEDURE — 45385 COLONOSCOPY W/LESION REMOVAL: CPT | Performed by: INTERNAL MEDICINE

## 2021-04-28 RX ORDER — LIDOCAINE HYDROCHLORIDE 20 MG/ML
INJECTION, SOLUTION EPIDURAL; INFILTRATION; INTRACAUDAL; PERINEURAL AS NEEDED
Status: DISCONTINUED | OUTPATIENT
Start: 2021-04-28 | End: 2021-04-28 | Stop reason: SURG

## 2021-04-28 RX ORDER — SODIUM CHLORIDE 0.9 % (FLUSH) 0.9 %
10 SYRINGE (ML) INJECTION AS NEEDED
Status: DISCONTINUED | OUTPATIENT
Start: 2021-04-28 | End: 2021-04-28 | Stop reason: HOSPADM

## 2021-04-28 RX ORDER — PROPOFOL 10 MG/ML
VIAL (ML) INTRAVENOUS AS NEEDED
Status: DISCONTINUED | OUTPATIENT
Start: 2021-04-28 | End: 2021-04-28 | Stop reason: SURG

## 2021-04-28 RX ORDER — SODIUM CHLORIDE 9 MG/ML
500 INJECTION, SOLUTION INTRAVENOUS CONTINUOUS PRN
Status: DISCONTINUED | OUTPATIENT
Start: 2021-04-28 | End: 2021-04-28 | Stop reason: HOSPADM

## 2021-04-28 RX ADMIN — PROPOFOL 450 MG: 10 INJECTION, EMULSION INTRAVENOUS at 10:40

## 2021-04-28 RX ADMIN — SODIUM CHLORIDE 500 ML: 9 INJECTION, SOLUTION INTRAVENOUS at 10:28

## 2021-04-28 RX ADMIN — LIDOCAINE HYDROCHLORIDE 100 MG: 20 INJECTION, SOLUTION EPIDURAL; INFILTRATION; INTRACAUDAL; PERINEURAL at 10:40

## 2021-04-28 NOTE — ANESTHESIA POSTPROCEDURE EVALUATION
Patient: Hanna Luciano    Procedure Summary     Date: 04/28/21 Room / Location: [facility] ENDOSCOPY 6 / [facility]    Anesthesia Start: 1030 Anesthesia Stop: 1108    Procedures:       ESOPHAGOGASTRODUODENOSCOPY WITH ANESTHESIA (N/A )      COLONOSCOPY WITH ANESTHESIA (N/A ) Diagnosis:       Epigastric pain      Heartburn      Other dysphagia      Diarrhea, unspecified type      (Epigastric pain [R10.13])      (Heartburn [R12])      (Other dysphagia [R13.19])      (Diarrhea, unspecified type [R19.7])    Surgeons: Sandra Nguyen MD Provider: Florence Downing CRNA    Anesthesia Type: MAC ASA Status: 2          Anesthesia Type: MAC    Vitals  Vitals Value Taken Time   /72 04/28/21 1125   Temp     Pulse 76 04/28/21 1127   Resp 14 04/28/21 1125   SpO2 100 % 04/28/21 1127   Vitals shown include unvalidated device data.        Post Anesthesia Care and Evaluation    Patient location during evaluation: PHASE II  Patient participation: complete - patient participated  Level of consciousness: awake and alert  Pain management: adequate  Airway patency: patent  Anesthetic complications: No anesthetic complications  PONV Status: none  Cardiovascular status: acceptable  Respiratory status: acceptable  Hydration status: acceptable  No anesthesia care post op

## 2021-04-28 NOTE — ANESTHESIA PREPROCEDURE EVALUATION
Anesthesia Evaluation     Patient summary reviewed   no history of anesthetic complications:  NPO Solid Status: > 8 hours             Airway   Mallampati: II  Dental      Pulmonary - negative pulmonary ROS   Cardiovascular   Exercise tolerance: excellent (>7 METS)    (+) hyperlipidemia,       Neuro/Psych- negative ROS  GI/Hepatic/Renal/Endo    (+)  GERD,      Musculoskeletal     Abdominal    Substance History      OB/GYN          Other                      Anesthesia Plan    ASA 2     MAC   (Pt is deaf.  I typed out pt interview on word document with yes/no questions.)    Anesthetic plan, all risks, benefits, and alternatives have been provided, discussed and informed consent has been obtained with: patient.

## 2021-04-28 NOTE — ANESTHESIA PROCEDURE NOTES
Peripheral IV    Patient location during procedure: OR  Line placed for Fluids/Medication Admin.  Performed By   CRNA: Florence Downing CRNA  Preanesthetic Checklist  Completed: patient identified, IV checked, site marked, risks and benefits discussed, surgical consent, monitors and equipment checked, pre-op evaluation and timeout performed  Peripheral IV Prep   Patient position: supine   Prep: alcohol swabs  Patient monitoring: heart rate, cardiac monitor and continuous pulse ox  Peripheral IV Procedure   Laterality:right  Location:  Wrist  Catheter size: 22 G         Post Assessment   Dressing Type: tape and transparent.    IV Dressing/Site: clean, dry and intact

## 2021-04-29 ENCOUNTER — TELEPHONE (OUTPATIENT)
Dept: INTERNAL MEDICINE | Facility: CLINIC | Age: 56
End: 2021-04-29

## 2021-04-30 ENCOUNTER — TELEPHONE (OUTPATIENT)
Dept: FAMILY MEDICINE CLINIC | Facility: CLINIC | Age: 56
End: 2021-04-30

## 2021-04-30 PROBLEM — D12.6 ADENOMATOUS COLON POLYP: Status: ACTIVE | Noted: 2021-04-30

## 2021-04-30 LAB
CYTO UR: NORMAL
LAB AP CASE REPORT: NORMAL
PATH REPORT.FINAL DX SPEC: NORMAL
PATH REPORT.GROSS SPEC: NORMAL

## 2021-05-03 ENCOUNTER — TELEPHONE (OUTPATIENT)
Dept: NEUROSURGERY | Facility: CLINIC | Age: 56
End: 2021-05-03

## 2021-05-03 NOTE — TELEPHONE ENCOUNTER
SCHEDULED NEW PATIENT APPT 5/19/21 AT 1:30 PM WITH MAYANK VIGIL - PATIENT WILL NEED LAPTOP TO TRANSLATE FOR HEARING IMPAIRMENT.    PATIENT STATES SHE IS SCHEDULED FOR NEW PATIENT APPT FOR PAIN MANAGEMENT WITH [facility] THIS THURS 5/6/21. IF RECORDS NEEDED, PLEASE ADVISE.

## 2021-05-03 NOTE — TELEPHONE ENCOUNTER
EMAIL SENT TO KETURAH OLGUIN PRACTICE MANAGER AT [facility] TO SCHEDULE USE OF LAPTOP FOR 5/19/21@ 1:30PM

## 2021-05-05 ENCOUNTER — TELEPHONE (OUTPATIENT)
Dept: INTERNAL MEDICINE | Facility: CLINIC | Age: 56
End: 2021-05-05

## 2021-05-05 NOTE — TELEPHONE ENCOUNTER
NEEDING A LETTER FOR INSURANCE TO STATE NEEDING HELP WITH PERSONAL HELP WITH [employer] PLEASE ADVISE   CALL BACK: [phone]

## 2021-05-07 ENCOUNTER — TELEPHONE (OUTPATIENT)
Dept: INTERNAL MEDICINE | Facility: CLINIC | Age: 56
End: 2021-05-07

## 2021-05-07 NOTE — TELEPHONE ENCOUNTER
Caller: Luciano Hanna Briones    Relationship: Self     Best call back number: [phone]    What is the best time to reach you: ANYTIME    Who are you requesting to speak with (clinical staff, provider,  specific staff member): CLINICAL    What was the call regarding: PATIENT IS CALLING ABOUT HER EXPERIENCE AT [facility]. SHE IS WANTING TO LET THE OFFICE KNOW THAT HER EXPERIENCE IS HORRIBLE AND AWFUL, THE DOCTOR IS TO BUSY TO TALK TO THE PATIENT AND THE  WAS NOT LICENSED. SHE IS JUST WANTING TO LET THE OFFICE KNOW HOW SHE WAS TREATED.     Do you require a callback: NO

## 2021-05-10 ENCOUNTER — OFFICE VISIT (OUTPATIENT)
Dept: FAMILY MEDICINE CLINIC | Facility: CLINIC | Age: 56
End: 2021-05-10

## 2021-05-10 VITALS
OXYGEN SATURATION: 99 % | HEART RATE: 97 BPM | DIASTOLIC BLOOD PRESSURE: 76 MMHG | WEIGHT: 140.6 LBS | BODY MASS INDEX: 24.13 KG/M2 | SYSTOLIC BLOOD PRESSURE: 125 MMHG | TEMPERATURE: 98.7 F

## 2021-05-10 DIAGNOSIS — M51.37 DEGENERATION OF LUMBOSACRAL INTERVERTEBRAL DISC: ICD-10-CM

## 2021-05-10 DIAGNOSIS — M54.41 CHRONIC BILATERAL LOW BACK PAIN WITH BILATERAL SCIATICA: ICD-10-CM

## 2021-05-10 DIAGNOSIS — M54.42 CHRONIC BILATERAL LOW BACK PAIN WITH BILATERAL SCIATICA: ICD-10-CM

## 2021-05-10 DIAGNOSIS — F51.01 PRIMARY INSOMNIA: ICD-10-CM

## 2021-05-10 DIAGNOSIS — G89.29 CHRONIC BILATERAL LOW BACK PAIN WITH BILATERAL SCIATICA: ICD-10-CM

## 2021-05-10 PROCEDURE — 99213 OFFICE O/P EST LOW 20 MIN: CPT | Performed by: NURSE PRACTITIONER

## 2021-05-10 PROCEDURE — 96372 THER/PROPH/DIAG INJ SC/IM: CPT | Performed by: NURSE PRACTITIONER

## 2021-05-10 RX ORDER — METHYLPREDNISOLONE ACETATE 40 MG/ML
40 INJECTION, SUSPENSION INTRA-ARTICULAR; INTRALESIONAL; INTRAMUSCULAR; SOFT TISSUE ONCE
Status: COMPLETED | OUTPATIENT
Start: 2021-05-10 | End: 2021-05-10

## 2021-05-10 RX ORDER — IBUPROFEN 800 MG/1
800 TABLET ORAL EVERY 8 HOURS PRN
Qty: 60 TABLET | Refills: 0 | Status: SHIPPED | OUTPATIENT
Start: 2021-05-10 | End: 2021-09-01

## 2021-05-10 RX ORDER — TEMAZEPAM 30 MG/1
30 CAPSULE ORAL DAILY
Qty: 30 CAPSULE | Refills: 0 | Status: SHIPPED | OUTPATIENT
Start: 2021-05-10 | End: 2021-06-10

## 2021-05-10 RX ORDER — GABAPENTIN 800 MG/1
800 TABLET ORAL 3 TIMES DAILY
Qty: 90 TABLET | Refills: 2 | Status: SHIPPED | OUTPATIENT
Start: 2021-05-10

## 2021-05-10 RX ORDER — ACETAMINOPHEN AND CODEINE PHOSPHATE 300; 15 MG/1; MG/1
TABLET ORAL EVERY 8 HOURS
COMMUNITY
End: 2021-07-22

## 2021-05-10 RX ORDER — METHOCARBAMOL 750 MG/1
750 TABLET, FILM COATED ORAL 3 TIMES DAILY PRN
Qty: 90 TABLET | Refills: 0 | Status: SHIPPED | OUTPATIENT
Start: 2021-05-10 | End: 2021-06-09 | Stop reason: SDUPTHER

## 2021-05-10 RX ADMIN — METHYLPREDNISOLONE ACETATE 40 MG: 40 INJECTION, SUSPENSION INTRA-ARTICULAR; INTRALESIONAL; INTRAMUSCULAR; SOFT TISSUE at 13:33

## 2021-05-10 NOTE — PROGRESS NOTES
CC: pain and insomnia    History:  Hanna Luciano is a 55 y.o. female who presents today for evaluation of the above problems.      Patient reports that she went to Pain Mgmt on Friday, however, it didn't go well.  She states that communication through  there didn't go well at all. She does not want to go back due to the difficulties she experienced.      She is here today for controlled medication monitoring of her gabapentin and restoril.    These medications continue to work well for her.  She does have an upcoming appointment with neurosurgery on the 19th.          HPI  ROS:  Review of Systems   Musculoskeletal: Positive for back pain.   Psychiatric/Behavioral: Positive for sleep disturbance.       Allergies   Allergen Reactions   • Sulfamethoxazole-Trimethoprim Anaphylaxis   • Atorvastatin Unknown (See Comments)     Pt unsure of allergy   • Baclofen GI Intolerance   • Diphenhydramine Other (See Comments)     Pt unsure of allergy   • Lorazepam Other (See Comments)     Pt unsure of allergy   • Morphine GI Intolerance   • Penicillins Unknown (See Comments)     Pt unsure of allergy   • Soma [Carisoprodol] Unknown (See Comments)     Pt unsure of allergy   • Valproic Acid Unknown (See Comments)     Pt unsure of allergy     Past Medical History:   Diagnosis Date   • Arthritis    • Depression    • Family history of colon cancer    • Generalized headaches    • GERD (gastroesophageal reflux disease)    • History of colon polyps    • Hyperlipidemia      Past Surgical History:   Procedure Laterality Date   • APPENDECTOMY     • BACK SURGERY  2003   • CHOLECYSTECTOMY     • COLONOSCOPY N/A 4/28/2021    Procedure: COLONOSCOPY WITH ANESTHESIA;  Surgeon: Sandra Nguyen MD;  Location: [facility] ENDOSCOPY;  Service: Gastroenterology;  Laterality: N/A;  preop; epogastric pain  postop; polyps   PCP Andres Martinez    • ENDOSCOPY N/A 4/28/2021    Procedure: ESOPHAGOGASTRODUODENOSCOPY WITH ANESTHESIA;  Surgeon: Sandra Nguyen  MD MARY;  Location: [facility] ENDOSCOPY;  Service: Gastroenterology;  Laterality: N/A;  preop; dysphagia  postop; normal  PCP Kirti Martinez    • EYE SURGERY Left 1965    left eyebrow   • OOPHORECTOMY Left 2000     Family History   Problem Relation Age of Onset   • Hyperlipidemia Other    • Heart disease Other    • Hypertension Other    • Diabetes Other    • Cancer Other    • Arthritis Other    • Mental illness Other    • Colon cancer Mother 77   • No Known Problems Father    • Colon polyps Neg Hx    • Esophageal cancer Neg Hx    • Liver cancer Neg Hx    • Liver disease Neg Hx    • Rectal cancer Neg Hx    • Stomach cancer Neg Hx       reports that she quit smoking about 21 years ago. Her smoking use included cigarettes and electronic cigarette. She has never used smokeless tobacco. She reports previous alcohol use. She reports that she does not use drugs.      Current Outpatient Medications:   •  acetaminophen (TYLENOL) 500 MG tablet, Take 500 mg by mouth As Needed for Mild Pain ., Disp: , Rfl:   •  acetaminophen-codeine (TYLENOL with CODEINE #2) 300-15 MG tablet, Take  by mouth Every 8 (Eight) Hours., Disp: , Rfl:   •  azelastine (ASTELIN) 0.1 % nasal spray, 2 sprays into the nostril(s) as directed by provider 2 (Two) Times a Day. Use in each nostril as directed, Disp: , Rfl:   •  buPROPion XL (WELLBUTRIN XL) 300 MG 24 hr tablet, TAKE 1 TABLET BY MOUTH EVERY DAY, Disp: 30 tablet, Rfl: 1  •  Diclofenac Sodium (VOLTAREN) 1 % gel gel, Apply 4 g topically to the appropriate area as directed 4 (Four) Times a Day As Needed., Disp: , Rfl:   •  estradiol (ESTRACE) 0.1 MG/GM vaginal cream, Insert 2 g into the vagina Every Other Day., Disp: , Rfl:   •  fluticasone (FLONASE) 50 MCG/ACT nasal spray, 2 sprays into the nostril(s) as directed by provider Daily., Disp: , Rfl:   •  gabapentin (NEURONTIN) 800 MG tablet, Take 1 tablet by mouth 3 (Three) Times a Day., Disp: 90 tablet, Rfl: 2  •  ibuprofen (ADVIL,MOTRIN) 800 MG tablet, Take 1  tablet by mouth Every 8 (Eight) Hours As Needed for Moderate Pain ., Disp: 60 tablet, Rfl: 0  •  lansoprazole (PREVACID) 30 MG capsule, TAKE 1 CAPSULE BY MOUTH DAILY, Disp: 30 capsule, Rfl: 5  •  methocarbamol (ROBAXIN) 750 MG tablet, Take 1 tablet by mouth 3 (Three) Times a Day As Needed for Muscle Spasms., Disp: 90 tablet, Rfl: 0  •  rosuvastatin (CRESTOR) 20 MG tablet, TAKE 1 TABLET BY MOUTH EVERY NIGHT., Disp: 90 tablet, Rfl: 2  •  temazepam (RESTORIL) 30 MG capsule, Take 1 capsule by mouth Daily., Disp: 30 capsule, Rfl: 0  No current facility-administered medications for this visit.    OBJECTIVE:  /76 (BP Location: Right arm, Patient Position: Standing, Cuff Size: Adult)   Pulse 97   Temp 98.7 °F (37.1 °C) (Temporal)   Wt 63.8 kg (140 lb 9.6 oz)   LMP  (LMP Unknown)   SpO2 99%   Breastfeeding No   BMI 24.13 kg/m²    Physical Exam  Vitals reviewed.   Constitutional:       Appearance: She is well-developed.   Cardiovascular:      Rate and Rhythm: Normal rate.   Pulmonary:      Effort: Pulmonary effort is normal.   Neurological:      Mental Status: She is alert and oriented to person, place, and time.   Psychiatric:         Behavior: Behavior normal.         Assessment/Plan    Diagnoses and all orders for this visit:    1. Degeneration of lumbosacral intervertebral disc  -     gabapentin (NEURONTIN) 800 MG tablet; Take 1 tablet by mouth 3 (Three) Times a Day.  Dispense: 90 tablet; Refill: 2  -     methocarbamol (ROBAXIN) 750 MG tablet; Take 1 tablet by mouth 3 (Three) Times a Day As Needed for Muscle Spasms.  Dispense: 90 tablet; Refill: 0    2. Chronic bilateral low back pain with bilateral sciatica  -     ibuprofen (ADVIL,MOTRIN) 800 MG tablet; Take 1 tablet by mouth Every 8 (Eight) Hours As Needed for Moderate Pain .  Dispense: 60 tablet; Refill: 0  -     methylPREDNISolone acetate (DEPO-medrol) injection 40 mg    3. Primary insomnia  -     temazepam (RESTORIL) 30 MG capsule; Take 1 capsule by  "mouth Daily.  Dispense: 30 capsule; Refill: 0    Shine reviewed and appropriate. Dr. Beth has been filling gabapentin for the last few months, however, I am fine with filling this since he has released her from needing routine follow up with him.     This visit was completed using computer  system: \"Chichi\" [id number]    An After Visit Summary was printed and given to the patient at discharge.  Return in about 3 months (around 8/10/2021).       Karyn Claros, YARITZA 5/10/21    Electronically signed.  "

## 2021-05-12 ENCOUNTER — TELEPHONE (OUTPATIENT)
Dept: INTERNAL MEDICINE | Facility: CLINIC | Age: 56
End: 2021-05-12

## 2021-05-12 NOTE — TELEPHONE ENCOUNTER
Call completed through  service.  Patient advised.  She wants to wait on possibly another referral for pain management until her appt next week with neurosurgeon and see what they have to say.

## 2021-05-12 NOTE — TELEPHONE ENCOUNTER
She still needs to see them. I would like their opinion on what is going on with her back.  The ibuprofen 800 mg isn't good for long term use.  Especially with her having hard to control reflux.  It is hard on the stomach. Even though the pain mgmt appt didn't go well with Keyshawn, she may need to be referred to a different pain mgmt clinic.  [facility] doesn't have one.

## 2021-05-18 ENCOUNTER — TELEPHONE (OUTPATIENT)
Dept: NEUROSURGERY | Facility: CLINIC | Age: 56
End: 2021-05-18

## 2021-05-18 NOTE — TELEPHONE ENCOUNTER
PT CALLED AND CANCELLED APPT FOR TOMORROW WITH MAYANK RUSSELL.  PT STATED THIS WAS A 2ND OPINION AND HAS SEEN MANY DRS IN THE PAST.  I INFORMED PT THAT HER APPT WAS SCHEDULED FROM A REFERRAL AND THE REFERRAL WAS GOOD FOR A YEAR IF SHE CHANGED HER MIND.

## 2021-05-19 ENCOUNTER — TELEPHONE (OUTPATIENT)
Dept: INTERNAL MEDICINE | Facility: CLINIC | Age: 56
End: 2021-05-19

## 2021-05-19 NOTE — TELEPHONE ENCOUNTER
CANCELED REFERRAL APT. BUT WANT TO KEEP MEDICATION AND NEUROLOGY SHE IS FEELING BETTER DOSEN'T FEEL THE NEED TO SEE SPECIALIST    CALL BACK: [phone]

## 2021-05-28 RX ORDER — FLUTICASONE PROPIONATE 50 MCG
2 SPRAY, SUSPENSION (ML) NASAL DAILY
Qty: 16 G | Refills: 5 | Status: SHIPPED | OUTPATIENT
Start: 2021-05-28 | End: 2021-07-15

## 2021-05-28 NOTE — TELEPHONE ENCOUNTER
Caller: Mustapha Hanna Anali    Relationship: Self    Best call back number: [phone]     Medication needed:   Requested Prescriptions     Pending Prescriptions Disp Refills   • fluticasone (FLONASE) 50 MCG/ACT nasal spray       Si sprays into the nostril(s) as directed by provider Daily.       When do you need the refill by: 2021    What additional details did the patient provide when requesting the medication: COMPLETELY OUT    Does the patient have less than a 3 day supply:  [x] Yes  [] No    What is the patient's preferred pharmacy: [facility] [address] [phone] [facility] [phone]

## 2021-06-02 ENCOUNTER — TELEPHONE (OUTPATIENT)
Dept: INTERNAL MEDICINE | Facility: CLINIC | Age: 56
End: 2021-06-02

## 2021-06-02 DIAGNOSIS — F51.01 PRIMARY INSOMNIA: ICD-10-CM

## 2021-06-02 DIAGNOSIS — M51.37 DEGENERATION OF LUMBOSACRAL INTERVERTEBRAL DISC: ICD-10-CM

## 2021-06-02 NOTE — TELEPHONE ENCOUNTER
Drug therapy appt 5/10/21  Contract, UDS and Inocente urine done 01/25/2021    Patient requesting refill 8 days early

## 2021-06-02 NOTE — TELEPHONE ENCOUNTER
Caller: Hanna Luciano    Relationship: Self    Best call back number: [phone]    Medication needed:   Requested Prescriptions     Pending Prescriptions Disp Refills   • gabapentin (NEURONTIN) 800 MG tablet 90 tablet 2     Sig: Take 1 tablet by mouth 3 (Three) Times a Day.   • methocarbamol (ROBAXIN) 750 MG tablet 90 tablet 0     Sig: Take 1 tablet by mouth 3 (Three) Times a Day As Needed for Muscle Spasms.   • temazepam (RESTORIL) 30 MG capsule 30 capsule 0     Sig: Take 1 capsule by mouth Daily.       When do you need the refill by:   06/13/21    What additional details did the patient provide when requesting the medication:   PATIENT SAID THAT THESE 3 MEDICATION DO HELP HER CONTROL THE PAIN ALONG WITH A HEATING PAD AND REST    Does the patient have less than a 3 day supply:  [] Yes  [x] No    What is the patient's preferred pharmacy: [facility] [address] [phone] [facility] [phone] FX

## 2021-06-02 NOTE — TELEPHONE ENCOUNTER
Patient contacted through  service.  Patient not wanting to see specialists she has been referred to.  States she has gone through this in the past and outcome the same.  Patient states will continue to see Dr. Beth.  She states she has spoken with him and he will prescribe her medications.  She will continue to see Karyn.  She states that when she spoke with Dr. Beth he wanted to be able to discuss patient's care with Dr. Olguin because nurse practioners are limited in the care they can provide.

## 2021-06-03 RX ORDER — GABAPENTIN 800 MG/1
800 TABLET ORAL 3 TIMES DAILY
Qty: 90 TABLET | Refills: 2 | OUTPATIENT
Start: 2021-06-03

## 2021-06-03 RX ORDER — TEMAZEPAM 30 MG/1
30 CAPSULE ORAL DAILY
Qty: 30 CAPSULE | Refills: 0 | OUTPATIENT
Start: 2021-06-03

## 2021-06-03 RX ORDER — METHOCARBAMOL 750 MG/1
750 TABLET, FILM COATED ORAL 3 TIMES DAILY PRN
Qty: 90 TABLET | Refills: 0 | OUTPATIENT
Start: 2021-06-03

## 2021-06-03 NOTE — TELEPHONE ENCOUNTER
Patient stated she wanted the meds to be refilled later because she knew it was to early.  I advised her to call back on 06/09 and we could refill her medications.  She voiced her understanding.

## 2021-06-09 DIAGNOSIS — M51.37 DEGENERATION OF LUMBOSACRAL INTERVERTEBRAL DISC: ICD-10-CM

## 2021-06-09 DIAGNOSIS — F51.01 PRIMARY INSOMNIA: ICD-10-CM

## 2021-06-09 RX ORDER — SUCRALFATE 1 G/1
1 TABLET ORAL 4 TIMES DAILY
Qty: 120 TABLET | Refills: 5 | Status: SHIPPED | OUTPATIENT
Start: 2021-06-09 | End: 2022-01-12 | Stop reason: SDUPTHER

## 2021-06-09 RX ORDER — TEMAZEPAM 30 MG/1
30 CAPSULE ORAL DAILY
Qty: 30 CAPSULE | Refills: 0 | Status: CANCELLED | OUTPATIENT
Start: 2021-06-09

## 2021-06-09 RX ORDER — METHOCARBAMOL 750 MG/1
750 TABLET, FILM COATED ORAL 3 TIMES DAILY PRN
Qty: 90 TABLET | Refills: 5 | Status: SHIPPED | OUTPATIENT
Start: 2021-06-09

## 2021-06-09 RX ORDER — GABAPENTIN 800 MG/1
800 TABLET ORAL 3 TIMES DAILY
Qty: 90 TABLET | Refills: 5 | Status: CANCELLED | OUTPATIENT
Start: 2021-06-09

## 2021-06-09 NOTE — TELEPHONE ENCOUNTER
MED REFILL REQUEST    1. SUCRALFATE 1MG (1) TABLET QID  2. GABAPENTIN 800MG (1) CAPSULE EVERY 8 HRS-ORIGINAL DR BARBER-YOU DISCUSSED DURING OV TO OVERTAKE REFILLS  3. METHOCARB 750MG (1) TABLET TID  4. TEMAZEPAM 30MG (1) QHS     [facility]       Drug therapy appt 5/10/21  Contract, UDS and Inocente urine done 01/25/2021    I DO NOT SEE SUCRALFATE IN HER MED LIST-DO YOU WANT ME TO ADD IT?

## 2021-06-10 DIAGNOSIS — F51.01 PRIMARY INSOMNIA: ICD-10-CM

## 2021-06-10 DIAGNOSIS — Z23 NEED FOR SHINGLES VACCINE: Primary | ICD-10-CM

## 2021-06-10 RX ORDER — ZOSTER VACCINE RECOMBINANT, ADJUVANTED 50 MCG/0.5
0.5 KIT INTRAMUSCULAR ONCE
Qty: 1 EACH | Refills: 1 | Status: SHIPPED | OUTPATIENT
Start: 2021-06-10 | End: 2021-06-10

## 2021-06-10 RX ORDER — TEMAZEPAM 30 MG/1
30 CAPSULE ORAL DAILY
Qty: 30 CAPSULE | Refills: 0 | OUTPATIENT
Start: 2021-06-10

## 2021-06-10 RX ORDER — TEMAZEPAM 30 MG/1
30 CAPSULE ORAL DAILY
Qty: 30 CAPSULE | Refills: 0 | Status: SHIPPED | OUTPATIENT
Start: 2021-06-10 | End: 2021-07-08 | Stop reason: SDUPTHER

## 2021-06-10 NOTE — TELEPHONE ENCOUNTER
Caller: Luciano Hanna Briones    Relationship: Self    Best call back number: [phone]    Medication needed:   Requested Prescriptions     Pending Prescriptions Disp Refills   • Diclofenac Sodium (VOLTAREN) 1 % gel gel [Pharmacy Med Name: DICLOFENAC GEL 1%] 100 g 2     Sig: APPLY TOPICALLY TO THE APPROPRIATE AREA AS DIRECTED FOUR TIMES DAILY   • temazepam (RESTORIL) 30 MG capsule [Pharmacy Med Name: TEMAZEPAM 30MG] 30 capsule 0     Sig: TAKE 1 CAPSULE BY MOUTH DAILY       When do you need the refill by: ASAP      Does the patient have less than a 3 day supply:  [x] Yes  [] No    What is the patient's preferred pharmacy: [facility] [address] [phone] [facility] [phone] FX

## 2021-06-12 NOTE — TELEPHONE ENCOUNTER
Please advise her on Monday to  copy of disc of MRI from hospital prior to appointment with neurosurgery.  2.04

## 2021-06-14 ENCOUNTER — TELEPHONE (OUTPATIENT)
Dept: INTERNAL MEDICINE | Facility: CLINIC | Age: 56
End: 2021-06-14

## 2021-06-14 NOTE — TELEPHONE ENCOUNTER
PATIENT CALLED IN REQUESTING A CALL BACK TO DISCUSS A ORTHOPEDIC REFERRAL AT  [facility] INSTEAD TO NEUROSURGERY     PLEASE CALL BACK AND ADVISE   [phone]

## 2021-06-15 NOTE — TELEPHONE ENCOUNTER
Patient contacted through  service.  Patient was advised.  Patient will keep appt with neurosurgery.

## 2021-06-15 NOTE — TELEPHONE ENCOUNTER
Back pain is addressed by neurosurgery.  Orthopedics would be hips, knees, shoulders, wrist, etc.  Anything not spinal related.  Sciatica pain going down legs stems from low back, so it is neurosurgery.    So, if ortho is appropriate, it is fine.

## 2021-06-24 ENCOUNTER — TELEPHONE (OUTPATIENT)
Dept: INTERNAL MEDICINE | Facility: CLINIC | Age: 56
End: 2021-06-24

## 2021-06-24 NOTE — TELEPHONE ENCOUNTER
I responded to patient through Mychart explaining I updated her physical date in care caps and that should have fixed the problem saying she was due for an appt.

## 2021-06-24 NOTE — TELEPHONE ENCOUNTER
Caller: Hanna Luciano    Relationship: Self    Best call back number: [phone] (H)    What is the best time to reach you: ANYTIME     Who are you requesting to speak with (clinical staff, provider,  specific staff member): CLINICAL     Do you know the name of the person who called: CLINICAL   What was the call regarding: STATES SHE SEEN A MESSAGE ON HER MY CHART STATING SHE NEEDED TO COME IN FOR AN APPOINTMENT     SHE IS REQUESTING A CALL BACK TO VERIFY IF SHE NEEDS TO COME IN BEFORE HER APPOINTMENT WITH NEURO July 15    STATES SHE HAS TRANSPORTATION NOW AND WANTED TO LET THE CLINICAL TEAM KNOW SHE IS VERY EXCITED ABOUT THAT     Do you require a callback: YES

## 2021-06-25 RX ORDER — BUPROPION HYDROCHLORIDE 300 MG/1
TABLET ORAL
Qty: 30 TABLET | Refills: 5 | Status: SHIPPED | OUTPATIENT
Start: 2021-06-25 | End: 2021-12-21

## 2021-07-08 DIAGNOSIS — F51.01 PRIMARY INSOMNIA: ICD-10-CM

## 2021-07-08 NOTE — TELEPHONE ENCOUNTER
Caller: Mustapha Hanna Briones    Relationship: Self    Best call back number: [phone]    Medication needed:   Requested Prescriptions     Pending Prescriptions Disp Refills   • temazepam (RESTORIL) 30 MG capsule 30 capsule 0     Sig: Take 1 capsule by mouth Daily.       When do you need the refill by: ASAP    What additional details did the patient provide when requesting the medication:     Patient has a 2 day supply left.     Does the patient have less than a 3 day supply:  [x] Yes  [] No    What is the patient's preferred pharmacy: [facility] [address] [phone] [facility] [phone]

## 2021-07-09 RX ORDER — TEMAZEPAM 30 MG/1
30 CAPSULE ORAL DAILY
Qty: 30 CAPSULE | Refills: 0 | Status: SHIPPED | OUTPATIENT
Start: 2021-07-09 | End: 2021-08-11 | Stop reason: SDUPTHER

## 2021-07-12 RX ORDER — AZELASTINE HCL 205.5 UG/1
SPRAY NASAL
Qty: 30 ML | Refills: 1 | Status: SHIPPED | OUTPATIENT
Start: 2021-07-12 | End: 2021-09-23

## 2021-07-14 RX ORDER — ESTRADIOL 0.1 MG/G
CREAM VAGINAL
Qty: 42.5 G | Refills: 1 | Status: SHIPPED | OUTPATIENT
Start: 2021-07-14 | End: 2021-07-15

## 2021-07-14 RX ORDER — ESTRADIOL 0.1 MG/G
2 CREAM VAGINAL EVERY OTHER DAY
Status: CANCELLED | OUTPATIENT
Start: 2021-07-14

## 2021-07-14 NOTE — TELEPHONE ENCOUNTER
Caller: Hanna Luciano   (VIA ))    Relationship: Self    Best call back number: [phone]    Medication needed:   Requested Prescriptions     Pending Prescriptions Disp Refills   • estradiol (ESTRACE) 0.1 MG/GM vaginal cream       Sig: Insert 2 g into the vagina Every Other Day.       When do you need the refill by: ASAP      Does the patient have less than a 3 day supply:  [x] Yes  [] No    What is the patient's preferred pharmacy:        [facility] [address] [phone] [facility] [phone]

## 2021-07-15 ENCOUNTER — HOSPITAL ENCOUNTER (OUTPATIENT)
Dept: GENERAL RADIOLOGY | Facility: HOSPITAL | Age: 56
Discharge: HOME | End: 2021-07-15
Admitting: NURSE PRACTITIONER

## 2021-07-15 ENCOUNTER — OFFICE VISIT (OUTPATIENT)
Dept: NEUROSURGERY | Facility: CLINIC | Age: 56
End: 2021-07-15

## 2021-07-15 VITALS — BODY MASS INDEX: 23.9 KG/M2 | WEIGHT: 140 LBS | HEIGHT: 64 IN

## 2021-07-15 DIAGNOSIS — R20.0 NUMBNESS AND TINGLING OF BOTH LOWER EXTREMITIES: ICD-10-CM

## 2021-07-15 DIAGNOSIS — M96.1 FAILED BACK SYNDROME: Primary | ICD-10-CM

## 2021-07-15 DIAGNOSIS — M79.605 PAIN IN BOTH LOWER EXTREMITIES: ICD-10-CM

## 2021-07-15 DIAGNOSIS — M79.604 PAIN IN BOTH LOWER EXTREMITIES: ICD-10-CM

## 2021-07-15 DIAGNOSIS — M54.50 LUMBAR BACK PAIN: ICD-10-CM

## 2021-07-15 DIAGNOSIS — M96.1 FAILED BACK SYNDROME: ICD-10-CM

## 2021-07-15 DIAGNOSIS — Z87.891 HISTORY OF TOBACCO ABUSE: ICD-10-CM

## 2021-07-15 DIAGNOSIS — R20.2 NUMBNESS AND TINGLING OF BOTH LOWER EXTREMITIES: ICD-10-CM

## 2021-07-15 PROCEDURE — 99214 OFFICE O/P EST MOD 30 MIN: CPT | Performed by: NURSE PRACTITIONER

## 2021-07-15 PROCEDURE — 72114 X-RAY EXAM L-S SPINE BENDING: CPT

## 2021-07-15 RX ORDER — FAMOTIDINE 20 MG/1
20 TABLET, FILM COATED ORAL 2 TIMES DAILY
COMMUNITY
Start: 2021-05-07 | End: 2021-10-22

## 2021-07-15 NOTE — PROGRESS NOTES
Primary Care Provider: Karyn Claros APRN  Requesting Provider: No ref. provider found    Chief Complaint:   Chief Complaint   Patient presents with   • Back Pain     Complaints of back pain and bilateral leg pain.     History of Present Illness  Consultation today at the request of No ref. provider found    HPI:  Hanna Luciano is a 55 y.o. female who presents today with a complaint of lumbar back and bilateral leg pain, 80% back, 20% legs.  History of lumbar surgery in 2003 and 2010.  Ms. Luciano is hearing impaired.  iPad used for  services via sign language.   #[id number].    Gradual and progressive onset of lumbar back pain over the past 20 years.  Prior to her original surgery in 2003 she complained of lumbar back and left hip pain.  Her discomfort worsened postoperatively and relatively unchanged after her additional surgery in 2010.    She currently complains of constant lumbar back pain that radiates down the anterior left thigh and posterior left thigh/leg extending to the plantar surface of the foot, as well as radicular pain to the anterior and lateral right thigh to the knee.  She additionally reports numbness and tingling in the same distributions, as well as frequent muscle spasms.  No complaints of weakness.  Her discomfort worsens with prolonged sitting, standing, walking, and with physical activities such as lifting.  Alleviating factors include lying flat in a supine position and use of gabapentin, ibuprofen, temazepam, and Tylenol with codeine.  She denies gait or balance abnormalities, need for assist while ambulating, or falls.  She reports intermittent fevers with not taking her pain medications, however denies chills, night sweats, unexplained weight loss, saddle anesthesia, or bowel or bladder dysfunction.  She currently rates the severity of her symptoms 8/10.  No additional concerns at this time.    Ms. Luciano has not completed nor participated in a dedicated course of  physician directed physical therapy or massage and/or chiropractic care.  Recently evaluated by Dr. Mahajan with pain management, however states she will not be returning for reassessment due to lack of  services.    ROS  Review of Systems   Constitutional: Positive for chills.   HENT: Positive for hearing loss.    Eyes: Negative.    Respiratory: Negative.    Cardiovascular: Negative.    Gastrointestinal: Positive for abdominal pain and constipation.   Endocrine: Positive for cold intolerance.   Genitourinary: Negative.    Musculoskeletal: Positive for back pain, neck pain and neck stiffness.   Skin: Negative.    Allergic/Immunologic: Negative.    Neurological: Negative.    Hematological: Negative.    Psychiatric/Behavioral: Negative.    All other systems reviewed and are negative.    Past Medical History:   Diagnosis Date   • Arthritis    • Depression    • Family history of colon cancer    • Generalized headaches    • GERD (gastroesophageal reflux disease)    • History of colon polyps    • Hyperlipidemia      Past Surgical History:   Procedure Laterality Date   • APPENDECTOMY     • BACK SURGERY  2003   • CHOLECYSTECTOMY     • COLONOSCOPY N/A 4/28/2021    Procedure: COLONOSCOPY WITH ANESTHESIA;  Surgeon: Sandra Nguyen MD;  Location: [facility] ENDOSCOPY;  Service: Gastroenterology;  Laterality: N/A;  preop; epogastric pain  postop; polyps   PCP Andres Martinez    • ENDOSCOPY N/A 4/28/2021    Procedure: ESOPHAGOGASTRODUODENOSCOPY WITH ANESTHESIA;  Surgeon: Sandra Nguyen MD;  Location: [facility] ENDOSCOPY;  Service: Gastroenterology;  Laterality: N/A;  preop; dysphagia  postop; normal  PCP Kirti Martinez    • EYE SURGERY Left 1965    left eyebrow   • OOPHORECTOMY Left 2000     Family History: family history includes Arthritis in an other family member; Cancer in an other family member; Colon cancer (age of onset: 77) in her mother; Diabetes in an other family member; Heart disease in an other family member;  Hyperlipidemia in an other family member; Hypertension in an other family member; Mental illness in an other family member; No Known Problems in her father.    Social History:  reports that she quit smoking about 21 years ago. Her smoking use included cigarettes and electronic cigarette. She has never used smokeless tobacco. She reports previous alcohol use. She reports that she does not use drugs.    Medications:    Current Outpatient Medications:   •  acetaminophen (TYLENOL) 500 MG tablet, Take 500 mg by mouth As Needed for Mild Pain ., Disp: , Rfl:   •  acetaminophen-codeine (TYLENOL with CODEINE #2) 300-15 MG tablet, Take  by mouth Every 8 (Eight) Hours., Disp: , Rfl:   •  azelastine (ASTEPRO) 0.15 % solution nasal spray, USE 2 SPRAYS IN EACH NOSTRIL TWICE DAILY, Disp: 30 mL, Rfl: 1  •  buPROPion XL (WELLBUTRIN XL) 300 MG 24 hr tablet, TAKE 1 TABLET BY MOUTH EVERY DAY, Disp: 30 tablet, Rfl: 5  •  gabapentin (NEURONTIN) 800 MG tablet, Take 1 tablet by mouth 3 (Three) Times a Day., Disp: 90 tablet, Rfl: 2  •  ibuprofen (ADVIL,MOTRIN) 800 MG tablet, Take 1 tablet by mouth Every 8 (Eight) Hours As Needed for Moderate Pain ., Disp: 60 tablet, Rfl: 0  •  lansoprazole (PREVACID) 30 MG capsule, TAKE 1 CAPSULE BY MOUTH DAILY, Disp: 30 capsule, Rfl: 5  •  methocarbamol (ROBAXIN) 750 MG tablet, Take 1 tablet by mouth 3 (Three) Times a Day As Needed for Muscle Spasms., Disp: 90 tablet, Rfl: 5  •  rosuvastatin (CRESTOR) 20 MG tablet, TAKE 1 TABLET BY MOUTH EVERY NIGHT., Disp: 90 tablet, Rfl: 2  •  sucralfate (Carafate) 1 g tablet, Take 1 tablet by mouth 4 (Four) Times a Day., Disp: 120 tablet, Rfl: 5  •  temazepam (RESTORIL) 30 MG capsule, Take 1 capsule by mouth Daily., Disp: 30 capsule, Rfl: 0  •  famotidine (PEPCID) 20 MG tablet, Take 20 mg by mouth 2 (Two) Times a Day., Disp: , Rfl:     Allergies:  Sulfamethoxazole-trimethoprim, Atorvastatin, Baclofen, Diphenhydramine, Lorazepam, Morphine, Penicillins, Soma  "[carisoprodol], and Valproic acid    Objective   Ht 162.6 cm (64\")   Wt 63.5 kg (140 lb)   LMP  (LMP Unknown)   Breastfeeding No   BMI 24.03 kg/m²   Physical Exam  Vitals and nursing note reviewed.   Constitutional:       General: She is not in acute distress.     Appearance: Normal appearance. She is well-developed, well-groomed and normal weight. She is not ill-appearing, toxic-appearing or diaphoretic.   HENT:      Head: Normocephalic and atraumatic.      Right Ear: Decreased hearing noted.      Left Ear: Decreased hearing noted.   Eyes:      Extraocular Movements: EOM normal.      Conjunctiva/sclera: Conjunctivae normal.      Pupils: Pupils are equal, round, and reactive to light.   Neck:      Trachea: Trachea normal.   Cardiovascular:      Rate and Rhythm: Normal rate and regular rhythm.   Pulmonary:      Effort: Pulmonary effort is normal. No tachypnea, bradypnea, accessory muscle usage or respiratory distress.   Abdominal:      Palpations: Abdomen is soft.   Musculoskeletal:      Cervical back: Full passive range of motion without pain and neck supple.   Skin:     General: Skin is warm and dry.   Neurological:      Mental Status: She is alert and oriented to person, place, and time.      GCS: GCS eye subscore is 4. GCS verbal subscore is 5. GCS motor subscore is 6.      Gait: Gait is intact.      Deep Tendon Reflexes:      Reflex Scores:       Tricep reflexes are 2+ on the right side and 2+ on the left side.       Bicep reflexes are 2+ on the right side and 2+ on the left side.       Brachioradialis reflexes are 2+ on the right side and 2+ on the left side.       Patellar reflexes are 2+ on the right side and 2+ on the left side.       Achilles reflexes are 0 on the right side and 0 on the left side.  Psychiatric:         Speech: Speech normal.         Behavior: Behavior normal. Behavior is cooperative.       Neurologic Exam     Mental Status   Oriented to person, place, and time.   Attention: normal. " Concentration: normal.   Speech: speech is normal   Level of consciousness: alert    Cranial Nerves     CN II   Visual fields full to confrontation.     CN III, IV, VI   Pupils are equal, round, and reactive to light.  Extraocular motions are normal.     CN V   Facial sensation intact.     CN VII   Facial expression full, symmetric.     CN VIII   CN VIII normal.     CN IX, X   CN IX normal.     CN XI   CN XI normal.     Motor Exam   Right arm tone: normal  Left arm tone: normal  Right leg tone: normal  Left leg tone: normal    Strength   Right deltoid: 5/5  Left deltoid: 5/5  Right biceps: 5/5  Left biceps: 5/5  Right triceps: 5/5  Left triceps: 5/5  Right wrist extension: 5/5  Left wrist extension: 5/5  Right iliopsoas: 5/5  Left iliopsoas: 5/5  Right quadriceps: 5/5  Left quadriceps: 5/5  Right anterior tibial: 5/5  Left anterior tibial: 5/5  Right gastroc: 5/5  Left gastroc: 5/5  Right EHL 5/5  Left EHL 5/5       Sensory Exam   Right arm light touch: normal  Left arm light touch: normal  Right leg light touch: normal  Left leg light touch: normal    Gait, Coordination, and Reflexes     Gait  Gait: normal    Tremor   Resting tremor: absent  Intention tremor: absent  Action tremor: absent    Reflexes   Right brachioradialis: 2+  Left brachioradialis: 2+  Right biceps: 2+  Left biceps: 2+  Right triceps: 2+  Left triceps: 2+  Right patellar: 2+  Left patellar: 2+  Right achilles: 0  Left achilles: 0  Right : 4+  Left : 4+  Right plantar: normal  Left plantar: normal  Right Wallace: absent  Left Wallace: absent  Right ankle clonus: absent  Left ankle clonus: absent  Right pendular knee jerk: absent  Left pendular knee jerk: absent    Imaging: (independent review and interpretation)  MRI of the lumbar spine performed on 4/16/2021, however unable to view images.     ASSESSMENT and PLAN  Hanna Luciano is a 55 y.o. female with a significant medical history hearing impaired, 2 prior lumbar surgeries, level and  provider unknown, 2003 and 2010, hyperlipidemia, depression, and she is a former smoker she presents today with a new problem of mechanical low back and nondermatomal bilateral leg pain, 80% back, 20% leg.  Physical exam findings of absent bilateral Achilles reflexes, otherwise neurologically intact.  MRI of the lumbar spine performed 4/16/2021, however not available to review at this time.  Request patient mail or bring imaging for review.    TREATMENT RECOMMENDATIONS ...  Failed back syndrome after surgery  Chronic lumbar back pain  Nondermatomal leg pain, left > right  Bilateral lower extremity numbness and tingling  Differential diagnosis include, but are not limited to spondylolisthesis with concern for mechanical instability, degenerative facet arthropathy, failed back syndrome after surgery, degenerative lumbar stenosis, lumbar stenosis with neurogenic claudication, adjacent segment disease , spondyloarthropathies including ankylosis spondylitis (HLA-B27) or Paget's disease  , fibromyalgia or other rheumatological disease or malingering, conversion disorder, and secondary gain are diagnoses of exclusion.     We will proceed today by obtaining x-rays of the lumbar spine complete with flexion and extension.  As a means of first-line conservative management for Lumbar pain, we will send Hanna for a dedicated course of physician directed physical therapy; Rx provided.  I additionally recommended chiropractic and/or massage care as tolerated.  Unless contraindicated, Tylenol and ibuprofen or naproxen PRN per package instructions for pain, or may continue current pain medications as previously prescribed by outside clinician.  B/R/AE and use discussed.  We will also send her for an EMG and nerve conduction study of the bilateral lower extremities to confirm or refute radiculopathy from the lumbar spine or peripheral neuropathy as a causative factor for her dysesthesias.  We will have her return for reassessment with  Dr. Zapien at his next available appointment.  I advised the patient to call to return sooner for new or worsening complaints of weakness, paresthesias, gait disturbances, or any additional concerns.  Treatment options discussed in detail with Hanna and she voiced understanding.  Ms. Luciano agrees with this plan of care.    Diagnoses and all orders for this visit:    1. Failed back syndrome (Primary)  -     Ambulatory Referral to Physical Therapy Evaluate and treat (2 to 3 times weekly for 6 weeks)  -     XR Spine Lumbar Complete With Flex & Ext; Future    2. Lumbar back pain  -     Ambulatory Referral to Physical Therapy Evaluate and treat (2 to 3 times weekly for 6 weeks)  -     XR Spine Lumbar Complete With Flex & Ext; Future    3. Pain in both lower extremities  -     EMG & Nerve Conduction Test; Future    4. Numbness and tingling of both lower extremities  -     EMG & Nerve Conduction Test; Future    5. History of tobacco abuse      Return for FOLLOW UP WITH DR. ZAPIEN NEXT AVAILABLE WITH [facility]..    Thank you for this Consultation and the opportunity to participate in Hanna's care.    Sincerely,  Trevor Gomez, YARITZA    Level of Risk: Moderate due to: mild exacerbation of one chronic illness  MDM: Moderate  (Mod = 79927, High = 61409)

## 2021-07-19 ENCOUNTER — TELEPHONE (OUTPATIENT)
Dept: INTERNAL MEDICINE | Facility: CLINIC | Age: 56
End: 2021-07-19

## 2021-07-19 DIAGNOSIS — R52 PAIN: Primary | ICD-10-CM

## 2021-07-19 NOTE — TELEPHONE ENCOUNTER
Caller: Hanna Luciano    Relationship: Self    Best call back number: [phone] (H)    Medication needed:   TYLENOL 3     When do you need the refill by: THE END OF THE WEEK     What additional details did the patient provide when requesting the medication: STATES SHE IS REQUESTING REFILL OF TYLENOL 3 AND  THE [facility] WITH  NEUROSURGERY  REQUESTED HER TO ASK HER PRIMARY CARE PROVIDER     SHE STATES IF [facility] IS NOT ABLE TO REFILL THE TYLENOL 3 SHE IS REQUESTING A REFERRAL TO PAIN MANAGEMENT    AND ALSO A CALL BACK BEFORE REFERRAL HAS BEEN SENT     STATES SHE WILL BE HAVING SPINAL SURGERY ON HER L4-L5 BY THE BEGINNING OF THE YEAR     Does the patient have less than a 3 day supply:  [x] Yes  [] No    What is the patient's preferred pharmacy:      [facility] [address] [phone] [facility] [phone]   [phone]

## 2021-07-21 ENCOUNTER — TELEPHONE (OUTPATIENT)
Dept: NEUROSURGERY | Facility: CLINIC | Age: 56
End: 2021-07-21

## 2021-07-21 NOTE — TELEPHONE ENCOUNTER
Caller: Luciano Hanna Briones    Relationship: Self    Best call back number: [phone]    What is the best time to reach you: ANYTIME TODAY, SHE HAS  WITH HER TODAY.    Who are you requesting to speak with (clinical staff, provider,  specific staff member): N/A    Do you know the name of the person who called: N/A    What was the call regarding:   PATIENT CALLED.     PATIENT IS USING A .     PATIENT WOULD LIKE TO KNOW IF THE OFFICE RECEIVED HER CD OF HER MRI. SHE WOULD LIKE TO KNOW IF SHE WILL KNOW THE RESULTS OF THAT BEFORE OR ON HER APPOINTMENT IN September.     SHE WOULD ALSO LIKE TO KNOW IF SHE COULD RECEIVE A REFERRAL TO A DIFFERENT PAIN MANAGEMENT OFFICE AS THE [facility] HAS MADE IT VERY DIFFICULT FOR HER TO COMMUNICATE ABOUT HER CARE AS THEIR IN-PERSON  WASN'T UNDERSTANDING HER SIGN, AND IT MADE COMMUNICATION VERY DIFFICULT.     PATIENT WOULD LIKE TO SEE A PAIN MANAGMENT THAT HAS A BETTER IN-PERSON .     PATIENT WOULD LIKE TO BE ABLE TO UNDERSTAND HER CARE.    PATIENT WOULD ALSO LIKE TO NOTE THAT THE L-2/L-3 DO NOT SHOW UP ON THE MRI AS PATIENT HAS ALREADY RECEIVED THE INFUSION.     Do you require a callback:   YES PLEASE CALL BACK [phone]

## 2021-07-21 NOTE — TELEPHONE ENCOUNTER
Drug therapy appt 5/10/21  Contract, UDS and Inocente urine done 01/25/2021    Karyn has never filled the Tylenol 3.  Please advise.

## 2021-07-21 NOTE — TELEPHONE ENCOUNTER
I called the pt back and let her know we have not received CD at this time.      She does not want to continue with Dr. Mahajan.   She said she would figure out where she wants to go to pain management and let us know so we can put in a new referral.

## 2021-07-22 RX ORDER — ACETAMINOPHEN AND CODEINE PHOSPHATE 300; 30 MG/1; MG/1
1 TABLET ORAL EVERY 6 HOURS PRN
Qty: 90 TABLET | Refills: 0 | Status: SHIPPED | OUTPATIENT
Start: 2021-07-22 | End: 2023-02-08 | Stop reason: ALTCHOICE

## 2021-07-22 NOTE — TELEPHONE ENCOUNTER
That's fine to do the Tylenol #3.  But would prefer that it is handled by Pain Mgmt.  So place referral since she was not satisfied with Dr. Mahajan's office.

## 2021-07-22 NOTE — TELEPHONE ENCOUNTER
Patient has talked to neurosurgery and they care working on pain management referral.        Refill pended

## 2021-07-23 ENCOUNTER — TELEPHONE (OUTPATIENT)
Dept: NEUROSURGERY | Facility: CLINIC | Age: 56
End: 2021-07-23

## 2021-07-23 DIAGNOSIS — M54.50 LUMBAR BACK PAIN: ICD-10-CM

## 2021-07-23 DIAGNOSIS — M79.604 PAIN IN BOTH LOWER EXTREMITIES: ICD-10-CM

## 2021-07-23 DIAGNOSIS — M79.605 PAIN IN BOTH LOWER EXTREMITIES: ICD-10-CM

## 2021-07-23 DIAGNOSIS — M96.1 FAILED BACK SYNDROME: Primary | ICD-10-CM

## 2021-07-23 NOTE — TELEPHONE ENCOUNTER
Per Trevor/Dr. Jones pt to have a CT w/o contrast.   Order pended to Trevor.   Notified the pt and  Told her that when she got started wtith PT give us a call and we cuould move her appt up with Trevor.   She said she is not going to start Pt until she gets the CT.   She has a scheduled appt for 9/1/2021.   Pt voiced understanding.

## 2021-07-27 ENCOUNTER — TELEPHONE (OUTPATIENT)
Dept: INTERNAL MEDICINE | Facility: CLINIC | Age: 56
End: 2021-07-27

## 2021-07-27 DIAGNOSIS — M51.37 DEGENERATION OF LUMBOSACRAL INTERVERTEBRAL DISC: ICD-10-CM

## 2021-07-27 DIAGNOSIS — M96.1 LUMBAR POST-LAMINECTOMY SYNDROME: Primary | ICD-10-CM

## 2021-07-27 NOTE — TELEPHONE ENCOUNTER
Caller: Hanna Luciano    Relationship to patient: Self    Best call back number: [phone]    Patient states that [facility] is under investigation for breaking the law. She no longer wants to see them . She would like to be referred [facility].    Telephone for clinic: [phone]

## 2021-08-03 ENCOUNTER — TELEPHONE (OUTPATIENT)
Dept: NEUROSURGERY | Facility: CLINIC | Age: 56
End: 2021-08-03

## 2021-08-03 ENCOUNTER — TELEPHONE (OUTPATIENT)
Dept: INTERNAL MEDICINE | Facility: CLINIC | Age: 56
End: 2021-08-03

## 2021-08-03 NOTE — TELEPHONE ENCOUNTER
Per Trevor this is probably muscle not spine pain.    If pain is that may need to go to the ED.   Pt reports heel is swollen.   Pt voiced understanding.

## 2021-08-03 NOTE — TELEPHONE ENCOUNTER
Patient called back again because she has not heard back yet. She may go to the ER but is unsure yet. I advised I would route another message so we can get back to her quickly to hopefully advise her on what we can do for her.

## 2021-08-03 NOTE — TELEPHONE ENCOUNTER
PATIENT CALLED IN STATING THE REFERRAL TO [facility] TOLD PATIENT THEY HAVE NOT RECEIVED HER REFERRAL YET     PATIENT REQUEST THAT WE GET THAT SENT AS SOON AS POSSIBLE     GOOD CALL BACK   [phone]

## 2021-08-03 NOTE — TELEPHONE ENCOUNTER
Provider: KIANA ZAPIEN MD / MAYANK RUSSELL    Caller: CHIVO ARTEAGA ( ON LINE)  Relationship to Patient: SELF (WITH )    Pharmacy:[facility] [address]     Phone Number: [phone]    Reason for Call: PAIN LEVELS INCREASING.     When was the patient last seen: 7/15 WITH MAYANK RUSSELL. PATIENT HAS UPCOMING APPOINTMENT WITH DR. ZAPIEN ON 9/01    When did it start: PATIENT STATES THAT THE PAIN LEVELS BEGAN INCREASING ABOUT FOUR DAYS AGO.        Where is it located: RIGH LEG TO HEEL, LEFT LEG, LOWER BACK.     Characteristics of symptom/severity: PATIENT IS EXPERIENCING RADIATING PAIN FROM HER HIP DOWN TO HER HEEL. SHE STATES THAT HER LOWER BACK PAIN LEVEL IS STILL HIGH, LEFT LEG PAIN IS ALSO INCREASING IN PAIN FROM THE LEVEL IT HAD PREVIOUSLY BEEN AT.      PATIENT IS WANTING TO KNOW IF THE CLINICAL STAFF HAS ANY SUGGESTIONS TO HELP ALLEVIATE HER PAIN, SUCH AS USING A WALKER OR CANE.   PATIENT BELIEVES IT HAS SOMETHING TO DO WITH HER ONGOING ISSUES WITH THE NERVES AT L-4, L-5, S-1    What makes it worse: WALKING  What makes it better: N/A    What therapies/medications have you tried:  PATIENT HAS ATTEMPTED TO USE AN ICE PACK AND EXPERIENCED VERY LITTLE RELIEF.   PATIENT SAYS SHE WILL TRY SOAKING IN  A TUB TO SEE IF THAT WILL HELP.

## 2021-08-09 RX ORDER — METHYLPREDNISOLONE 4 MG/1
TABLET ORAL
Qty: 21 TABLET | Refills: 0 | Status: SHIPPED | OUTPATIENT
Start: 2021-08-09 | End: 2021-11-12

## 2021-08-09 NOTE — TELEPHONE ENCOUNTER
Pt states she is having a lot of left foot pain. Asking if she can get a steroid injection or more medication (whatever you gave her last time-started with an R) .  Has an appt with pain md next wed-would to have meds sent in versus coming in for the steroid injection.  Please advise.    [facility]

## 2021-08-10 DIAGNOSIS — F51.01 PRIMARY INSOMNIA: ICD-10-CM

## 2021-08-10 RX ORDER — TEMAZEPAM 30 MG/1
30 CAPSULE ORAL DAILY
Qty: 30 CAPSULE | Refills: 0 | OUTPATIENT
Start: 2021-08-10

## 2021-08-10 NOTE — TELEPHONE ENCOUNTER
Drug therapy appt 5/10/21  Contract, UDS and Inocente urine done 01/25/2021    LVM with  for patient that she is due for her 3 month follow up on her controlled medication and we could set her up for an appt tomorrow with YARITZA Smith.

## 2021-08-10 NOTE — TELEPHONE ENCOUNTER
"Patient called back and claims she didn't know she had to have an appt every 3 months to get her controlled medication refilled.  I explained to her we have explained this to her several times and even explained it to her when she signed the contract last January.  She then said she doesn't remember that.  I offered her an appt for tomorrow to see Karyn and she said she couldn't because she'd have to try and find transportation to get here.  I asked when she thought she could and she just kept saying, \"I don't know.\" She then said, \"So this is my responsibly to remember when I'm due every 3 months.\" and I said \"yes marian\".  I let her know that I would let Karyn know about this tomorrow morning since she is out of the office currently.   "

## 2021-08-10 NOTE — TELEPHONE ENCOUNTER
Caller: Mustapha Hanna Anali    Relationship: Self    Best call back number: [phone]    Medication needed:   Requested Prescriptions     Pending Prescriptions Disp Refills   • temazepam (RESTORIL) 30 MG capsule 30 capsule 0     Sig: Take 1 capsule by mouth Daily.       When do you need the refill by: TODAY    Does the patient have less than a 3 day supply:  [x] Yes  [] No    What is the patient's preferred pharmacy: [facility] [address] [phone] [facility] [phone]

## 2021-08-11 RX ORDER — TEMAZEPAM 30 MG/1
30 CAPSULE ORAL DAILY
Qty: 7 CAPSULE | Refills: 0 | Status: SHIPPED | OUTPATIENT
Start: 2021-08-11 | End: 2021-08-13 | Stop reason: SDUPTHER

## 2021-08-11 NOTE — TELEPHONE ENCOUNTER
Left message with  for patient explaining Karyn will send her in an Rx for 1 week and she needs to make arrangements with transportation to get here within the week.

## 2021-08-11 NOTE — TELEPHONE ENCOUNTER
Please put in refill request for one week of medication and she needs to make arrangements to come in within the next week. If she would like an in person  instead of the iPad she needs to let us know so we can make that accomodation.

## 2021-08-13 ENCOUNTER — OFFICE VISIT (OUTPATIENT)
Dept: FAMILY MEDICINE CLINIC | Facility: CLINIC | Age: 56
End: 2021-08-13

## 2021-08-13 VITALS
WEIGHT: 153 LBS | SYSTOLIC BLOOD PRESSURE: 106 MMHG | RESPIRATION RATE: 16 BRPM | HEIGHT: 64 IN | DIASTOLIC BLOOD PRESSURE: 72 MMHG | TEMPERATURE: 97.4 F | HEART RATE: 74 BPM | BODY MASS INDEX: 26.12 KG/M2 | OXYGEN SATURATION: 98 %

## 2021-08-13 DIAGNOSIS — F51.01 PRIMARY INSOMNIA: Primary | ICD-10-CM

## 2021-08-13 DIAGNOSIS — G89.29 CHRONIC MIDLINE LOW BACK PAIN WITH LEFT-SIDED SCIATICA: ICD-10-CM

## 2021-08-13 DIAGNOSIS — M54.42 CHRONIC MIDLINE LOW BACK PAIN WITH LEFT-SIDED SCIATICA: ICD-10-CM

## 2021-08-13 PROCEDURE — 99213 OFFICE O/P EST LOW 20 MIN: CPT | Performed by: NURSE PRACTITIONER

## 2021-08-13 RX ORDER — TEMAZEPAM 30 MG/1
30 CAPSULE ORAL DAILY
Qty: 30 CAPSULE | Refills: 0 | Status: SHIPPED | OUTPATIENT
Start: 2021-08-13 | End: 2021-08-20

## 2021-08-13 NOTE — PROGRESS NOTES
CC: controlled medication monitoring     History:  Hanna Luciano is a 55 y.o. female who presents today for evaluation of the above problems.      Patient reports that her gabapentin and temazepam continue to work well for pain and insomnia. She will be starting with Pain Mgmt. this coming Wednesday. She notes that she has been having increased pain in her left hip and low back with radiation down her leg.  She states that her left foot has been having pain right in front of the heel. This has improved since she has been taking the steroid dose pack that I sent.       HPI  ROS:  Review of Systems   Musculoskeletal: Positive for back pain.        Left low back, hip and leg   Psychiatric/Behavioral: Negative for sleep disturbance.       Allergies   Allergen Reactions   • Sulfamethoxazole-Trimethoprim Anaphylaxis   • Atorvastatin Unknown (See Comments)     Pt unsure of allergy   • Baclofen GI Intolerance   • Diphenhydramine Other (See Comments)     Pt unsure of allergy   • Lorazepam Other (See Comments)     Pt unsure of allergy   • Morphine GI Intolerance   • Penicillins Unknown (See Comments)     Pt unsure of allergy   • Soma [Carisoprodol] Unknown (See Comments)     Pt unsure of allergy   • Valproic Acid Unknown (See Comments)     Pt unsure of allergy     Past Medical History:   Diagnosis Date   • Arthritis    • Depression    • Family history of colon cancer    • Generalized headaches    • GERD (gastroesophageal reflux disease)    • History of colon polyps    • Hyperlipidemia      Past Surgical History:   Procedure Laterality Date   • APPENDECTOMY     • BACK SURGERY  2003   • CHOLECYSTECTOMY     • COLONOSCOPY N/A 4/28/2021    Procedure: COLONOSCOPY WITH ANESTHESIA;  Surgeon: Sandra Nguyen MD;  Location: [facility] ENDOSCOPY;  Service: Gastroenterology;  Laterality: N/A;  preop; epogastric pain  postop; polyps   PCP Andres Martinez    • ENDOSCOPY N/A 4/28/2021    Procedure: ESOPHAGOGASTRODUODENOSCOPY WITH ANESTHESIA;   Surgeon: Sandra Nguyen MD;  Location: [facility] ENDOSCOPY;  Service: Gastroenterology;  Laterality: N/A;  preop; dysphagia  postop; normal  PCP Kirti Martinez    • EYE SURGERY Left 1965    left eyebrow   • OOPHORECTOMY Left 2000     Family History   Problem Relation Age of Onset   • Hyperlipidemia Other    • Heart disease Other    • Hypertension Other    • Diabetes Other    • Cancer Other    • Arthritis Other    • Mental illness Other    • Colon cancer Mother 77   • No Known Problems Father    • Colon polyps Neg Hx    • Esophageal cancer Neg Hx    • Liver cancer Neg Hx    • Liver disease Neg Hx    • Rectal cancer Neg Hx    • Stomach cancer Neg Hx       reports that she quit smoking about 21 years ago. Her smoking use included cigarettes and electronic cigarette. She has never used smokeless tobacco. She reports previous alcohol use. She reports that she does not use drugs.      Current Outpatient Medications:   •  acetaminophen (TYLENOL) 500 MG tablet, Take 500 mg by mouth As Needed for Mild Pain ., Disp: , Rfl:   •  acetaminophen-codeine (TYLENOL #3) 300-30 MG per tablet, Take 1 tablet by mouth Every 6 (Six) Hours As Needed for Moderate Pain ., Disp: 90 tablet, Rfl: 0  •  azelastine (ASTEPRO) 0.15 % solution nasal spray, USE 2 SPRAYS IN EACH NOSTRIL TWICE DAILY, Disp: 30 mL, Rfl: 1  •  buPROPion XL (WELLBUTRIN XL) 300 MG 24 hr tablet, TAKE 1 TABLET BY MOUTH EVERY DAY, Disp: 30 tablet, Rfl: 5  •  famotidine (PEPCID) 20 MG tablet, Take 20 mg by mouth 2 (Two) Times a Day., Disp: , Rfl:   •  gabapentin (NEURONTIN) 800 MG tablet, Take 1 tablet by mouth 3 (Three) Times a Day., Disp: 90 tablet, Rfl: 2  •  lansoprazole (PREVACID) 30 MG capsule, TAKE 1 CAPSULE BY MOUTH DAILY, Disp: 30 capsule, Rfl: 5  •  methocarbamol (ROBAXIN) 750 MG tablet, Take 1 tablet by mouth 3 (Three) Times a Day As Needed for Muscle Spasms., Disp: 90 tablet, Rfl: 5  •  methylPREDNISolone (MEDROL) 4 MG dose pack, Take as directed on package  "instructions., Disp: 21 tablet, Rfl: 0  •  rosuvastatin (CRESTOR) 20 MG tablet, TAKE 1 TABLET BY MOUTH EVERY NIGHT., Disp: 90 tablet, Rfl: 2  •  sucralfate (Carafate) 1 g tablet, Take 1 tablet by mouth 4 (Four) Times a Day., Disp: 120 tablet, Rfl: 5  •  temazepam (RESTORIL) 30 MG capsule, Take 1 capsule by mouth Daily for 7 days., Disp: 30 capsule, Rfl: 0  •  ibuprofen (ADVIL,MOTRIN) 800 MG tablet, Take 1 tablet by mouth Every 8 (Eight) Hours As Needed for Moderate Pain ., Disp: 60 tablet, Rfl: 0    OBJECTIVE:  /72 (BP Location: Left arm, Patient Position: Sitting, Cuff Size: Adult)   Pulse 74   Temp 97.4 °F (36.3 °C) (Temporal)   Resp 16   Ht 162.6 cm (64\")   Wt 69.4 kg (153 lb)   LMP  (LMP Unknown)   SpO2 98%   BMI 26.26 kg/m²    Physical Exam  Vitals reviewed.   Constitutional:       Appearance: She is well-developed.   Cardiovascular:      Rate and Rhythm: Normal rate.   Pulmonary:      Effort: Pulmonary effort is normal.   Neurological:      Mental Status: She is alert and oriented to person, place, and time.   Psychiatric:         Behavior: Behavior normal.         Assessment/Plan    Diagnoses and all orders for this visit:    1. Primary insomnia (Primary)  -     temazepam (RESTORIL) 30 MG capsule; Take 1 capsule by mouth Daily for 7 days.  Dispense: 30 capsule; Refill: 0    2. Chronic midline low back pain with left-sided sciatica    Restoril refill sent to pharmacy. Gabapentin just refilled.  Contract and UDS up to date.       An After Visit Summary was printed and given to the patient at discharge.  Return in about 3 months (around 11/13/2021) for Next scheduled follow up - controlled med monitoring and  Annual physical.       YARITZA Smith 8/13/21    Electronically signed.  "

## 2021-08-18 ENCOUNTER — TELEPHONE (OUTPATIENT)
Dept: FAMILY MEDICINE CLINIC | Facility: CLINIC | Age: 56
End: 2021-08-18

## 2021-08-18 NOTE — TELEPHONE ENCOUNTER
[facility] CALLING TO CLARIFY RX DIRECTIONS FOR TEMAZEPAM RX SENT ON 08.13.21.  QUANTITY DID NOT MATCH DIRECTIONS.

## 2021-08-23 ENCOUNTER — HOSPITAL ENCOUNTER (OUTPATIENT)
Dept: NEUROLOGY | Facility: HOSPITAL | Age: 56
Discharge: HOME | End: 2021-08-23

## 2021-08-23 ENCOUNTER — HOSPITAL ENCOUNTER (OUTPATIENT)
Dept: CT IMAGING | Facility: HOSPITAL | Age: 56
Discharge: HOME | End: 2021-08-23

## 2021-08-23 DIAGNOSIS — M96.1 FAILED BACK SYNDROME: ICD-10-CM

## 2021-08-23 DIAGNOSIS — M79.604 PAIN IN BOTH LOWER EXTREMITIES: ICD-10-CM

## 2021-08-23 DIAGNOSIS — M54.50 LUMBAR BACK PAIN: ICD-10-CM

## 2021-08-23 DIAGNOSIS — R20.0 NUMBNESS AND TINGLING OF BOTH LOWER EXTREMITIES: ICD-10-CM

## 2021-08-23 DIAGNOSIS — M79.605 PAIN IN BOTH LOWER EXTREMITIES: ICD-10-CM

## 2021-08-23 DIAGNOSIS — R20.2 NUMBNESS AND TINGLING OF BOTH LOWER EXTREMITIES: ICD-10-CM

## 2021-08-23 PROCEDURE — 72131 CT LUMBAR SPINE W/O DYE: CPT

## 2021-08-23 PROCEDURE — 95886 MUSC TEST DONE W/N TEST COMP: CPT

## 2021-08-23 PROCEDURE — 95911 NRV CNDJ TEST 9-10 STUDIES: CPT

## 2021-08-24 ENCOUNTER — TELEPHONE (OUTPATIENT)
Dept: INTERNAL MEDICINE | Facility: CLINIC | Age: 56
End: 2021-08-24

## 2021-08-24 NOTE — TELEPHONE ENCOUNTER
Patient notified with help of  and I also reminded her that she will need to keep her appt in November for her temazepam refill.  She signed that she understood per .

## 2021-08-24 NOTE — TELEPHONE ENCOUNTER
Caller: Hanna Luciano    Relationship to patient: Self    Best call back number: [phone]    Patient is needing:     -PATIENT WANTED TO ADVISE PCP THAT SHE WANTS TO DELAY MAMMOGRAM REQUEST UNTIL NEXT YEAR DUE TO HER PAYING ON ANOTHER HOSPITAL BILL.     -PATIENT ALSO WANTED TO SEE IF PCP WOULD MAINTAIN HER RX FOR TAMAZEPAM-30MG AS HER PAIN MGMT DOCTOR CAN NOT.       PLEASE CONTACT PATIENT IF ANY QUESTIONS.

## 2021-08-25 ENCOUNTER — TELEPHONE (OUTPATIENT)
Dept: INTERNAL MEDICINE | Facility: CLINIC | Age: 56
End: 2021-08-25

## 2021-08-25 RX ORDER — NITROFURANTOIN 25; 75 MG/1; MG/1
100 CAPSULE ORAL 2 TIMES DAILY
Qty: 10 CAPSULE | Refills: 0 | Status: SHIPPED | OUTPATIENT
Start: 2021-08-25 | End: 2021-08-30

## 2021-08-25 NOTE — TELEPHONE ENCOUNTER
Caller: PATIENT / VIA     Relationship: SELF    Best call back number: [phone]        Who are you requesting to speak with     CLINICAL STAFF    Do you know the name of the person who called: CHIVO [facility]ARING     What was the call regarding:     PATIENT STATES SHE HAS A UTI. ASKING FOR A ANTIBIOTIC    Do you require a callback:     PLEASE ADVISE    [facility] - [address] [phone] [facility] [phone]

## 2021-08-25 NOTE — TELEPHONE ENCOUNTER
Called patient through  service.  Patient advised would need to come in to leave urinalysis.  Patient states she has been trying to find ride to office but is having issues at this time with that.  Patient complains of urinary frequency and burning for a few days.

## 2021-08-30 ENCOUNTER — TELEPHONE (OUTPATIENT)
Dept: INTERNAL MEDICINE | Facility: CLINIC | Age: 56
End: 2021-08-30

## 2021-08-30 DIAGNOSIS — B37.9 YEAST INFECTION: Primary | ICD-10-CM

## 2021-08-30 NOTE — TELEPHONE ENCOUNTER
I typically do a diflucan tablet that she would take orally and she only has to take two pills.  Is she wanting a vaginal medication instead?

## 2021-08-30 NOTE — TELEPHONE ENCOUNTER
Caller: Hanna Luciano    Relationship: Self    Best call back number: [phone]    What medication are you requesting: YEAST INFECTION MEDICATION, NEEDING A 7 DAYS MEDICATION     What are your current symptoms: WHITE DISCHARGE AND ITCHING     How long have you been experiencing symptoms: ONE DAY     Have you had these symptoms before:    [x] Yes  [] No    Have you been treated for these symptoms before:   [x] Yes  [] No    If a prescription is needed, what is your preferred pharmacy and phone number: [facility] [address] [phone] [facility] [phone]

## 2021-08-31 ENCOUNTER — TELEPHONE (OUTPATIENT)
Dept: NEUROSURGERY | Facility: CLINIC | Age: 56
End: 2021-08-31

## 2021-08-31 NOTE — TELEPHONE ENCOUNTER
CALLED PT TO CONFIRM APT WITH DR ZAPIEN FOR 09/01/2021 @ 11:30    PT CONFIRMED AND ALSO AGREED TO MOVE APT FROM 11:30 TO 10:30

## 2021-09-01 ENCOUNTER — OFFICE VISIT (OUTPATIENT)
Dept: NEUROSURGERY | Facility: CLINIC | Age: 56
End: 2021-09-01

## 2021-09-01 VITALS — WEIGHT: 156.8 LBS | BODY MASS INDEX: 26.77 KG/M2 | HEIGHT: 64 IN

## 2021-09-01 DIAGNOSIS — M79.604 PAIN IN BOTH LOWER EXTREMITIES: ICD-10-CM

## 2021-09-01 DIAGNOSIS — M54.50 LUMBAR BACK PAIN: ICD-10-CM

## 2021-09-01 DIAGNOSIS — R20.2 NUMBNESS AND TINGLING OF BOTH LOWER EXTREMITIES: ICD-10-CM

## 2021-09-01 DIAGNOSIS — Z87.891 HISTORY OF TOBACCO ABUSE: ICD-10-CM

## 2021-09-01 DIAGNOSIS — M96.1 FAILED BACK SYNDROME: Primary | ICD-10-CM

## 2021-09-01 DIAGNOSIS — M79.605 PAIN IN BOTH LOWER EXTREMITIES: ICD-10-CM

## 2021-09-01 DIAGNOSIS — R20.0 NUMBNESS AND TINGLING OF BOTH LOWER EXTREMITIES: ICD-10-CM

## 2021-09-01 DIAGNOSIS — E66.3 OVERWEIGHT WITH BODY MASS INDEX (BMI) OF 26 TO 26.9 IN ADULT: ICD-10-CM

## 2021-09-01 PROCEDURE — 99215 OFFICE O/P EST HI 40 MIN: CPT | Performed by: NEUROLOGICAL SURGERY

## 2021-09-01 RX ORDER — TRAMADOL HYDROCHLORIDE 50 MG/1
TABLET ORAL
COMMUNITY
Start: 2021-08-18 | End: 2023-02-08 | Stop reason: ALTCHOICE

## 2021-09-01 RX ORDER — FLUTICASONE PROPIONATE 50 MCG
SPRAY, SUSPENSION (ML) NASAL
COMMUNITY
Start: 2021-08-25 | End: 2022-02-07

## 2021-09-01 NOTE — PROGRESS NOTES
Chief complaint:   Chief Complaint   Patient presents with   • BACK & LEG PAIN     patient is here for follow up to discuss test results (xray, CT scan, EMG); patient was given order for therapy but did not complete any therapy.  Patient has had previous back surgery x 2 without any relief of her symptoms.  Patient is seeing pain mgmt @ [facility].       Subjective     HPI:   Interval History: Hanna returns today for reevaluation regarding her low back pain.  History of presenting illness was obtained via  from 11 23-11 50.   number [id number] and [id number].    Gradual and progressive onset of lumbar back pain over the past 20 years.      2003 she underwent an L4-S1 instrumented fusion for complaints of lumbar back and left hip pain.  Her discomfort worsened postoperatively and relatively unchanged after her additional surgery in 2010 she underwent a removal of instrumentation for broken katarina.  The second surgery was performed at [facility].  She cannot recall where the first surgery was done but believe that this was [facility].    Since we last saw her she was written for physical therapy and Occupational Therapy.  She was unable to establish physical therapy.  We did send her to Dr. Mahajan but they could not work with her due to her hearing impairment.  She has since established with the the nurse practitioner in the pain management center at [facility].  They have begun medications and she is scheduled to have injections next week.  Despite multiple attempts to open her CD on several computers her previous MRI cannot be open.  Several attempts were made to contact the patient regarding this and to obtain a new CD but she has not been able to do this.  She has obtained a noncontrast CT of the lumbar spine as well as x-rays.    Currently Hanna's pain is located in low back and left hip.  Severity: 5/10  Percentage pain: 50% back pain 50% left hip  pain  Radiation: Left leg  Numbness: Describes intermittent whole body numbness and tingling.  When she does activity this begins and spreads throughout her entire body  Weakness: Denies any loss of fine motor function in her hands.  She is hearing impaired and therefore uses American sign language  Fine Motor Skills: None  Gait: Able to ambulate without a cane or walker with normal stride and gait.  Bowel and Bladder Function: Denies    Oswestry Disability Index Lumbar = 34%  SCORE INTERPRETATION OF THE OSWESTRY LBP DISABILITY QUESTIONNAIRE     20-40% Moderate disability This group experiences more pain and problems with sitting, lifting, and standing. Travel and social life are more difficult and they may well be off work. Personal care, sexual activity, and sleeping are not grossly affected, and the back condition can usually be managed by conservative means.      Score   Pain Intensity Fairly severe pain-3   Personal Care Look after myself without pain-0   Lifting Can lift heavy weights with extra pain-1   Walking Pain prevents > 1 mile-1   Sitting Pain prevents sitting > 1 hr-2   Standing Pain limits standing to < 10 min-4   Sleeping Occasionally disturbed-1   Sex Life (if applicable) Not applicable-0   Social Life Pain limits more energetic activities-2   Traveling Pain restricts to < 1 hr-3   (Marvin et al, 1980)    Review of Systems   Constitutional: Negative.    HENT: Negative.    Eyes: Negative.    Respiratory: Negative.    Cardiovascular: Negative.    Gastrointestinal: Negative.    Endocrine: Negative.    Genitourinary: Negative.    Musculoskeletal: Positive for back pain.   Skin: Negative.    Allergic/Immunologic: Negative.    Neurological: Positive for numbness.   Hematological: Negative.    Psychiatric/Behavioral: Negative.        PFSH:  Past Medical History:   Diagnosis Date   • Arthritis    • Depression    • Family history of colon cancer    • Generalized headaches    • GERD (gastroesophageal  reflux disease)    • History of colon polyps    • Hyperlipidemia        Past Surgical History:   Procedure Laterality Date   • APPENDECTOMY     • BACK SURGERY  2003   • CHOLECYSTECTOMY     • COLONOSCOPY N/A 4/28/2021    Procedure: COLONOSCOPY WITH ANESTHESIA;  Surgeon: Sandra Nguyen MD;  Location: [facility] ENDOSCOPY;  Service: Gastroenterology;  Laterality: N/A;  preop; epogastric pain  postop; polyps   PCP Andres Martinez    • ENDOSCOPY N/A 4/28/2021    Procedure: ESOPHAGOGASTRODUODENOSCOPY WITH ANESTHESIA;  Surgeon: Sandra Nguyen MD;  Location: [facility] ENDOSCOPY;  Service: Gastroenterology;  Laterality: N/A;  preop; dysphagia  postop; normal  PCP Kirti Martinez    • EYE SURGERY Left 1965    left eyebrow   • OOPHORECTOMY Left 2000       Objective      Current Outpatient Medications   Medication Sig Dispense Refill   • acetaminophen (TYLENOL) 500 MG tablet Take 500 mg by mouth As Needed for Mild Pain .     • acetaminophen-codeine (TYLENOL #3) 300-30 MG per tablet Take 1 tablet by mouth Every 6 (Six) Hours As Needed for Moderate Pain . 90 tablet 0   • azelastine (ASTEPRO) 0.15 % solution nasal spray USE 2 SPRAYS IN EACH NOSTRIL TWICE DAILY 30 mL 1   • buPROPion XL (WELLBUTRIN XL) 300 MG 24 hr tablet TAKE 1 TABLET BY MOUTH EVERY DAY 30 tablet 5   • Diclofenac Sodium (VOLTAREN) 1 % gel gel      • famotidine (PEPCID) 20 MG tablet Take 20 mg by mouth 2 (Two) Times a Day.     • fluticasone (FLONASE) 50 MCG/ACT nasal spray      • gabapentin (NEURONTIN) 800 MG tablet Take 1 tablet by mouth 3 (Three) Times a Day. 90 tablet 2   • lansoprazole (PREVACID) 30 MG capsule TAKE 1 CAPSULE BY MOUTH DAILY 30 capsule 5   • methocarbamol (ROBAXIN) 750 MG tablet Take 1 tablet by mouth 3 (Three) Times a Day As Needed for Muscle Spasms. 90 tablet 5   • methylPREDNISolone (MEDROL) 4 MG dose pack Take as directed on package instructions. 21 tablet 0   • rosuvastatin (CRESTOR) 20 MG tablet TAKE 1 TABLET BY MOUTH EVERY NIGHT. 90 tablet 2   •  "sucralfate (Carafate) 1 g tablet Take 1 tablet by mouth 4 (Four) Times a Day. 120 tablet 5   • terconazole (TERAZOL 7) 0.4 % vaginal cream Insert 1 applicator into the vagina Every Night for 7 days. 45 g 0   • traMADol (Ultram) 50 MG tablet        No current facility-administered medications for this visit.       Vital Signs  Ht 162.6 cm (64\")   Wt 71.1 kg (156 lb 12.8 oz)   LMP  (LMP Unknown)   BMI 26.91 kg/m²   Physical Exam  Eyes:      Extraocular Movements: EOM normal.      Pupils: Pupils are equal, round, and reactive to light.   Neurological:      Mental Status: She is oriented to person, place, and time.      Gait: Gait is intact.      Deep Tendon Reflexes:      Reflex Scores:       Tricep reflexes are 2+ on the right side and 2+ on the left side.       Bicep reflexes are 2+ on the right side and 2+ on the left side.       Brachioradialis reflexes are 2+ on the right side and 2+ on the left side.       Patellar reflexes are 2+ on the right side and 2+ on the left side.       Achilles reflexes are 2+ on the right side and 2+ on the left side.  Psychiatric:         Speech: Speech normal.       Neurologic Exam     Mental Status   Oriented to person, place, and time.   Speech: speech is normal     Cranial Nerves     CN II   Visual fields full to confrontation.     CN III, IV, VI   Pupils are equal, round, and reactive to light.  Extraocular motions are normal.     CN V   Right facial sensation deficit: none  Left facial sensation deficit: none    CN VII   Facial expression full, symmetric.     CN VIII   Hearing: intact    CN IX, X   Palate: symmetric    CN XI   Right sternocleidomastoid strength: normal  Left sternocleidomastoid strength: normal    CN XII   Tongue deviation: none    Motor Exam     Strength   Right deltoid: 5/5  Left deltoid: 5/5  Right biceps: 5/5  Left biceps: 5/5  Right triceps: 5/5  Left triceps: 5/5  Right interossei: 5/5  Left interossei: 5/5  Right iliopsoas: 5/5  Left iliopsoas: " 5/5  Right quadriceps: 5/5  Left quadriceps: 5/5  Right anterior tibial: 5/5  Left anterior tibial: 5/5  Right gastroc: 5/5  Left gastroc: 5/5    Sensory Exam   Right arm light touch: normal  Left arm light touch: normal  Right leg light touch: normal  Left leg light touch: normal    Gait, Coordination, and Reflexes     Gait  Gait: normal    Reflexes   Right brachioradialis: 2+  Left brachioradialis: 2+  Right biceps: 2+  Left biceps: 2+  Right triceps: 2+  Left triceps: 2+  Right patellar: 2+  Left patellar: 2+  Right achilles: 2+  Left achilles: 2+  Right Wallace: absent  Left Wallace: absent    (12 bullet pts)    Results Review:   CT Lumbar Spine Without Contrast    Result Date: 8/23/2021  1. No acute fracture or malalignment. 2. Postoperative changes as above. Greatest spinal canal stenosis is relative mild at L3-4. This report was finalized on 08/23/2021 11:47 by Dr Kajal Bridges MD.                AP lateral flexion and extension radiographs are reviewed.  Agree with interpretation.  Noncontrast CT of the lumbar spine is reviewed.        Hanna Luciano is a 55 y.o. female with a significant medical history hearing impaired, 2 prior lumbar surgeries, level and provider unknown, 2003 and 2010, hyperlipidemia, depression, and she is a former smoker she presents today with a new problem of mechanical low back and nondermatomal bilateral leg pain, 80% back, 20% leg.  Physical exam findings of neurologically intact.  MRI of the lumbar spine performed 4/16/2021, however not available to review at this time.  Request patient mail or bring imaging for review.  Noncontrast CT shows adequate fusion from L4-S1.  Previously placed instrumentation and pedicle screws have been removed.  Wide decompression without evidence of stenosis.  Flexion-extension films show no evidence of instability. Normal EMG/NCS.      TREATMENT RECOMMENDATIONS ...  Failed back syndrome after surgery  Chronic lumbar back and left hip pain  present since prior to her first surgery  Nondermatomal leg pain, left > right  Bilateral intermittent upper and lower extremity numbness and tingling  The evaluation of cervicalgia after prior cervical spine surgery includes ...  1.  Where the correct indications for the initial surgery present?  2.  Was the surgery performed technically well without complications?  3.  Has the patient developed new pathology in the interim such as adjacent segment disease?  4.  Finally, is there a surgical indication or a misdiagnosis that if treated would improve the patient's quality of life.    Unfortunately given Hanna's limited recollection of her initial surgery and no preoperative imaging available I cannot make a determination on whether or not appropriate indications were met.  However it is she does complain of preoperative back and left hip pain and this would not be in the L4-L5 distribution.  Her original surgery appears to be performed satisfactorily.  She appears to have obtained a fusion at L4/5 and L5/1.  While there is some mild pseudoarthrosis at L4/5 which no doubt resulted in her fracture katarina, there is bridging bone to suggest she has stability.  Furthermore her flexion-extension films show no evidence of instability at this time.  Additionally she has evidence of a wide decompression.  While this would be better confirmed with an MRI, she has been unable to get this to us.  Based on her CT scan I would assume that she has no further stenosis.  Given her complaints of predominantly hip pain and isolated back pain after fusion I would favor a diagnosis of failed back syndrome rather than neurological compression.  This is based on her intact neurological exam, normal reflexes, and an EMG and nerve conduction study that shows no evidence of radicular compression.  Based on these findings I would not recommend further surgical intervention and I suggest continued work with pain management.  Her only possible  surgical intervention that might alleviate some of her pain would be implantation of a spinal cord stimulator.  But again I will defer this to pain management.  All questions were answered to the patient's satisfaction.        1. Failed back syndrome    2. Lumbar back pain    3. Pain in both lower extremities    4. Numbness and tingling of both lower extremities    5. History of tobacco abuse    6. Overweight with body mass index (BMI) of 26 to 26.9 in adult        Recommendations:  Diagnoses and all orders for this visit:    1. Failed back syndrome (Primary)    2. Lumbar back pain    3. Pain in both lower extremities    4. Numbness and tingling of both lower extremities    5. History of tobacco abuse    6. Overweight with body mass index (BMI) of 26 to 26.9 in adult        Return if symptoms worsen or fail to improve.    I spent 65 minutes caring for Hanna on this date of service. This time includes time spent by me in the following activities: preparing for the visit, reviewing tests, obtaining and/or reviewing a separately obtained history, performing a medically appropriate examination and/or evaluation and counseling and educating the patient/family/caregiver.       Thank you, for allowing me to continue to participate in the care of this patient.    Sincerely,  Nitin Jones MD

## 2021-09-01 NOTE — PATIENT INSTRUCTIONS
"PATIENT TO CONTINUE TO FOLLOW UP WITH HER PRIMARY CARE PROVIDER FOR YEARLY PHYSICAL EXAMS TO ENSURE COMPLETE HEALTH MAINTENANCE        BMI for Adults  What is BMI?  Body mass index (BMI) is a number that is calculated from a person's weight and height. BMI can help estimate how much of a person's weight is composed of fat. BMI does not measure body fat directly. Rather, it is an alternative to procedures that directly measure body fat, which can be difficult and expensive.  BMI can help identify people who may be at higher risk for certain medical problems.  What are BMI measurements used for?  BMI is used as a screening tool to identify possible weight problems. It helps determine whether a person is obese, overweight, a healthy weight, or underweight.  BMI is useful for:  · Identifying a weight problem that may be related to a medical condition or may increase the risk for medical problems.  · Promoting changes, such as changes in diet and exercise, to help reach a healthy weight. BMI screening can be repeated to see if these changes are working.  How is BMI calculated?  BMI involves measuring your weight in relation to your height. Both height and weight are measured, and the BMI is calculated from those numbers. This can be done either in English ([place] or metric measurements. Note that charts and online BMI calculators are available to help you find your BMI quickly and easily without having to do these calculations yourself.  To calculate your BMI in English ([place] measurements:    1. Measure your weight in pounds (lb).  2. Multiply the number of pounds by 703.  ? For example, for a person who weighs 180 lb, multiply that number by 703, which equals 126,540.  3. Measure your height in inches. Then multiply that number by itself to get a measurement called \"inches squared.\"  ? For example, for a person who is 70 inches tall, the \"inches squared\" measurement is 70 inches x 70 inches, which equals 4,900 inches " "squared.  4. Divide the total from step 2 (number of lb x 703) by the total from step 3 (inches squared): 126,540 ÷ 4,900 = 25.8. This is your BMI.  To calculate your BMI in metric measurements:  1. Measure your weight in kilograms (kg).  2. Measure your height in meters (m). Then multiply that number by itself to get a measurement called \"meters squared.\"  ? For example, for a person who is 1.75 m tall, the \"meters squared\" measurement is 1.75 m x 1.75 m, which is equal to 3.1 meters squared.  3. Divide the number of kilograms (your weight) by the meters squared number. In this example: 70 ÷ 3.1 = 22.6. This is your BMI.  What do the results mean?  BMI charts are used to identify whether you are underweight, normal weight, overweight, or obese. The following guidelines will be used:  · Underweight: BMI less than 18.5.  · Normal weight: BMI between 18.5 and 24.9.  · Overweight: BMI between 25 and 29.9.  · Obese: BMI of 30 or above.  Keep these notes in mind:  · Weight includes both fat and muscle, so someone with a muscular build, such as an athlete, may have a BMI that is higher than 24.9. In cases like these, BMI is not an accurate measure of body fat.  · To determine if excess body fat is the cause of a BMI of 25 or higher, further assessments may need to be done by a health care provider.  · BMI is usually interpreted in the same way for men and women.  Where to find more information  For more information about BMI, including tools to quickly calculate your BMI, go to these websites:  · Centers for Disease Control and Prevention: www.cdc.gov  · American Heart Association: www.heart.org  · National Heart, Lung, and Blood [place]: www.nhlbi.nih.gov  Summary  · Body mass index (BMI) is a number that is calculated from a person's weight and height.  · BMI may help estimate how much of a person's weight is composed of fat. BMI can help identify those who may be at higher risk for certain medical problems.  · BMI " "can be measured using English measurements or metric measurements.  · BMI charts are used to identify whether you are underweight, normal weight, overweight, or obese.  This information is not intended to replace advice given to you by your health care provider. Make sure you discuss any questions you have with your health care provider.  Document Revised: 09/09/2020 Document Reviewed: 07/17/2020  [employer] Patient Education © 2021 [employer]      https://www.nhlbi.nih.gov/files/docs/public/heart/dash_brief.pdf\">   DASH Eating Plan  DASH stands for Dietary Approaches to Stop Hypertension. The DASH eating plan is a healthy eating plan that has been shown to:  · Reduce high blood pressure (hypertension).  · Reduce your risk for type 2 diabetes, heart disease, and stroke.  · Help with weight loss.  What are tips for following this plan?  Reading food labels  · Check food labels for the amount of salt (sodium) per serving. Choose foods with less than 5 percent of the Daily Value of sodium. Generally, foods with less than 300 milligrams (mg) of sodium per serving fit into this eating plan.  · To find whole grains, look for the word \"whole\" as the first word in the ingredient list.  Shopping  · Buy products labeled as \"low-sodium\" or \"no salt added.\"  · Buy fresh foods. Avoid canned foods and pre-made or frozen meals.  Cooking  · Avoid adding salt when cooking. Use salt-free seasonings or herbs instead of table salt or sea salt. Check with your health care provider or pharmacist before using salt substitutes.  · Do not blackwell foods. Cook foods using healthy methods such as baking, boiling, grilling, roasting, and broiling instead.  · Cook with heart-healthy oils, such as olive, canola, avocado, soybean, or sunflower oil.  Meal planning    · Eat a balanced diet that includes:  ? 4 or more servings of fruits and 4 or more servings of vegetables each day. Try to fill one-half of your plate with fruits and vegetables.  ? 6-8 " servings of whole grains each day.  ? Less than 6 oz (170 g) of lean meat, poultry, or fish each day. A 3-oz (85-g) serving of meat is about the same size as a deck of cards. One egg equals 1 oz (28 g).  ? 2-3 servings of low-fat dairy each day. One serving is 1 cup (237 mL).  ? 1 serving of nuts, seeds, or beans 5 times each week.  ? 2-3 servings of heart-healthy fats. Healthy fats called omega-3 fatty acids are found in foods such as walnuts, flaxseeds, fortified milks, and eggs. These fats are also found in cold-water fish, such as sardines, salmon, and mackerel.  · Limit how much you eat of:  ? Canned or prepackaged foods.  ? Food that is high in trans fat, such as some fried foods.  ? Food that is high in saturated fat, such as fatty meat.  ? Desserts and other sweets, sugary drinks, and other foods with added sugar.  ? Full-fat dairy products.  · Do not salt foods before eating.  · Do not eat more than 4 egg yolks a week.  · Try to eat at least 2 vegetarian meals a week.  · Eat more home-cooked food and less restaurant, buffet, and fast food.  Lifestyle  · When eating at a restaurant, ask that your food be prepared with less salt or no salt, if possible.  · If you drink alcohol:  ? Limit how much you use to:  § 0-1 drink a day for women who are not pregnant.  § 0-2 drinks a day for men.  ? Be aware of how much alcohol is in your drink. In the [place] one drink equals one 12 oz bottle of beer (355 mL), one 5 oz glass of wine (148 mL), or one 1½ oz glass of hard liquor (44 mL).  General information  · Avoid eating more than 2,300 mg of salt a day. If you have hypertension, you may need to reduce your sodium intake to 1,500 mg a day.  · Work with your health care provider to maintain a healthy body weight or to lose weight. Ask what an ideal weight is for you.  · Get at least 30 minutes of exercise that causes your heart to beat faster (aerobic exercise) most days of the week. Activities may include walking,  swimming, or biking.  · Work with your health care provider or dietitian to adjust your eating plan to your individual calorie needs.  What foods should I eat?  Fruits  All fresh, dried, or frozen fruit. Canned fruit in natural juice (without added sugar).  Vegetables  Fresh or frozen vegetables (raw, steamed, roasted, or grilled). Low-sodium or reduced-sodium tomato and vegetable juice. Low-sodium or reduced-sodium tomato sauce and tomato paste. Low-sodium or reduced-sodium canned vegetables.  Grains  Whole-grain or whole-wheat bread. Whole-grain or whole-wheat pasta. Brown rice. Oatmeal. Quinoa. Bulgur. Whole-grain and low-sodium cereals. Valerie bread. Low-fat, low-sodium crackers. Whole-wheat flour tortillas.  Meats and other proteins  Skinless chicken or turkey. Ground chicken or turkey. Pork with fat trimmed off. Fish and seafood. Egg whites. Dried beans, peas, or lentils. Unsalted nuts, nut butters, and seeds. Unsalted canned beans. Lean cuts of beef with fat trimmed off. Low-sodium, lean precooked or cured meat, such as sausages or meat loaves.  Dairy  Low-fat (1%) or fat-free (skim) milk. Reduced-fat, low-fat, or fat-free cheeses. Nonfat, low-sodium ricotta or cottage cheese. Low-fat or nonfat yogurt. Low-fat, low-sodium cheese.  Fats and oils  Soft margarine without trans fats. Vegetable oil. Reduced-fat, low-fat, or light mayonnaise and salad dressings (reduced-sodium). Canola, safflower, olive, avocado, soybean, and sunflower oils. Avocado.  Seasonings and condiments  Herbs. Spices. Seasoning mixes without salt.  Other foods  Unsalted popcorn and pretzels. Fat-free sweets.  The items listed above may not be a complete list of foods and beverages you can eat. Contact a dietitian for more information.  What foods should I avoid?  Fruits  Canned fruit in a light or heavy syrup. Fried fruit. Fruit in cream or butter sauce.  Vegetables  Creamed or fried vegetables. Vegetables in a cheese sauce. Regular canned  vegetables (not low-sodium or reduced-sodium). Regular canned tomato sauce and paste (not low-sodium or reduced-sodium). Regular tomato and vegetable juice (not low-sodium or reduced-sodium). Pickles. Olives.  Grains  Baked goods made with fat, such as croissants, muffins, or some breads. Dry pasta or rice meal packs.  Meats and other proteins  Fatty cuts of meat. Ribs. Fried meat. Brown. Bologna, salami, and other precooked or cured meats, such as sausages or meat loaves. Fat from the back of a pig (fatback). Bratwurst. Salted nuts and seeds. Canned beans with added salt. Canned or smoked fish. Whole eggs or egg yolks. Chicken or turkey with skin.  Dairy  Whole or 2% milk, cream, and half-and-half. Whole or full-fat cream cheese. Whole-fat or sweetened yogurt. Full-fat cheese. Nondairy creamers. Whipped toppings. Processed cheese and cheese spreads.  Fats and oils  Butter. Stick margarine. Lard. Shortening. Ghee. Brown fat. Tropical oils, such as coconut, palm kernel, or palm oil.  Seasonings and condiments  Onion salt, garlic salt, seasoned salt, table salt, and sea salt. Worcestershire sauce. Tartar sauce. Barbecue sauce. Teriyaki sauce. Soy sauce, including reduced-sodium. Steak sauce. Canned and packaged gravies. Fish sauce. Oyster sauce. Cocktail sauce. Store-bought horseradish. Ketchup. Mustard. Meat flavorings and tenderizers. Bouillon cubes. Hot sauces. Pre-made or packaged marinades. Pre-made or packaged taco seasonings. Relishes. Regular salad dressings.  Other foods  Salted popcorn and pretzels.  The items listed above may not be a complete list of foods and beverages you should avoid. Contact a dietitian for more information.  Where to find more information  · National Heart, Lung, and Blood [place]: www.nhlbi.nih.gov  · American Heart Association: www.heart.org  · Academy of Nutrition and Dietetics: www.eatright.org  · National Kidney Foundation: www.kidney.org  Summary  · The DASH eating plan is a  healthy eating plan that has been shown to reduce high blood pressure (hypertension). It may also reduce your risk for type 2 diabetes, heart disease, and stroke.  · When on the DASH eating plan, aim to eat more fresh fruits and vegetables, whole grains, lean proteins, low-fat dairy, and heart-healthy fats.  · With the DASH eating plan, you should limit salt (sodium) intake to 2,300 mg a day. If you have hypertension, you may need to reduce your sodium intake to 1,500 mg a day.  · Work with your health care provider or dietitian to adjust your eating plan to your individual calorie needs.  This information is not intended to replace advice given to you by your health care provider. Make sure you discuss any questions you have with your health care provider.  Document Revised: 11/20/2020 Document Reviewed: 11/20/2020  [employer] Patient Education © 2021 [employer]

## 2021-09-15 DIAGNOSIS — F51.01 PRIMARY INSOMNIA: Primary | ICD-10-CM

## 2021-09-15 NOTE — TELEPHONE ENCOUNTER
Patient contacted through  service for clarification on what medication she needs refilled.  Patient requesting refill on temazepam.      Drug therapy appt 08/13/2021  Contract, UDS and Inocente urine done 01/25/2021    Rx Refill Note  Requested Prescriptions     Pending Prescriptions Disp Refills   • temazepam (RESTORIL) 30 MG capsule 30 capsule 0     Sig: Take 1 capsule by mouth Every Night.      Last office visit with prescribing clinician: 8/13/2021      Next office visit with prescribing clinician: 11/12/2021            Juju Walsh MA  09/15/21, 15:18 CDT

## 2021-09-15 NOTE — TELEPHONE ENCOUNTER
Caller: Hanna Luciano    Relationship: Self    Best call back number: [phone]     Medication needed:   Requested Prescriptions      No prescriptions requested or ordered in this encounter   Clonazepam - Not in pt med list     When do you need the refill by: Friday     What additional details did the patient provide when requesting the medication:     Does the patient have less than a 3 day supply:  [] Yes  [] No    What is the patient's preferred pharmacy:    [facility] [address] [phone] [facility] [phone]

## 2021-09-16 RX ORDER — TEMAZEPAM 30 MG/1
30 CAPSULE ORAL NIGHTLY
Qty: 30 CAPSULE | Refills: 0 | Status: SHIPPED | OUTPATIENT
Start: 2021-09-16 | End: 2021-10-15 | Stop reason: SDUPTHER

## 2021-09-23 RX ORDER — AZELASTINE HCL 205.5 UG/1
SPRAY NASAL
Qty: 30 ML | Refills: 0 | Status: SHIPPED | OUTPATIENT
Start: 2021-09-23 | End: 2021-10-27

## 2021-09-23 NOTE — TELEPHONE ENCOUNTER
Rx Refill Note  Requested Prescriptions     Pending Prescriptions Disp Refills   • azelastine (ASTEPRO) 0.15 % solution nasal spray [Pharmacy Med Name: AZELASTINE SPR 0.15%] 30 mL 0     Sig: USE 2 SPRAYS IN EACH NOSTRIL TWICE DAILY      Last office visit with prescribing clinician: 8/13/2021-back pain    Next office visit with prescribing clinician: 11/12/2021    LRX:7/12/2021      Faith Greene MA  09/23/21, 09:42 CDT

## 2021-10-15 DIAGNOSIS — F51.01 PRIMARY INSOMNIA: ICD-10-CM

## 2021-10-15 DIAGNOSIS — Z23 NEED FOR SHINGLES VACCINE: Primary | ICD-10-CM

## 2021-10-15 RX ORDER — TEMAZEPAM 30 MG/1
30 CAPSULE ORAL NIGHTLY
Qty: 30 CAPSULE | Refills: 0 | Status: SHIPPED | OUTPATIENT
Start: 2021-10-15 | End: 2021-11-12 | Stop reason: SDUPTHER

## 2021-10-15 NOTE — TELEPHONE ENCOUNTER
Caller: Hanna Luciano    Relationship: Self      Medication requested (name and dosage): temazepam (RESTORIL) 30 MG capsule    Pharmacy where request should be sent: [facility] [address] [phone] [facility] [phone]   [phone]    Additional details provided by patient: PATIENT ALSO REQUESTING A SHINGLES VACCINATION ORDER BE SENT TO PHARMACY     Best call back number: [phone]    Does the patient have less than a 3 day supply:  [x] Yes  [] No    Aguilar Sierra Rep   10/15/21 12:17 CDT

## 2021-10-15 NOTE — TELEPHONE ENCOUNTER
Rx Refill Note  Requested Prescriptions     Pending Prescriptions Disp Refills   • temazepam (RESTORIL) 30 MG capsule 30 capsule 0     Sig: Take 1 capsule by mouth Every Night.      Last office visit with prescribing clinician: 8/13/2021      Next office visit with prescribing clinician: 11/12/2021   CPE past due since 09/2021    Drug therapy appt 08/13/2021  Contract, UDS and Inocente urine done 01/25/2021    Note sent with Rx directions on the temazepam that there are no more refills till after her 11/12/2021 appointment with Karyn Claros.     Is Refill Pharmacy correct?: yes    Juju Sawyer MA  10/15/21, 12:47 CDT

## 2021-10-22 RX ORDER — FAMOTIDINE 20 MG/1
TABLET, FILM COATED ORAL
Qty: 60 TABLET | Refills: 5 | Status: SHIPPED | OUTPATIENT
Start: 2021-10-22 | End: 2022-05-05

## 2021-10-22 NOTE — TELEPHONE ENCOUNTER
Rx Refill Note  Requested Prescriptions     Pending Prescriptions Disp Refills   • famotidine (PEPCID) 20 MG tablet [Pharmacy Med Name: FAMOTIDINE 20MG] 60 tablet 5     Sig: TAKE 1 TABLET BY MOUTH TWO TIMES A DAY.      Last office visit with prescribing clinician: Visit date not found      Next office visit with prescribing clinician: Visit date not found     s Refill Pharmacy correct?: yes    Juju Sawyer MA  10/22/21, 15:20 CDT

## 2021-10-27 RX ORDER — AZELASTINE HCL 205.5 UG/1
SPRAY NASAL
Qty: 30 ML | Refills: 0 | Status: SHIPPED | OUTPATIENT
Start: 2021-10-27 | End: 2021-12-20

## 2021-11-12 ENCOUNTER — OFFICE VISIT (OUTPATIENT)
Dept: FAMILY MEDICINE CLINIC | Facility: CLINIC | Age: 56
End: 2021-11-12

## 2021-11-12 VITALS
OXYGEN SATURATION: 97 % | BODY MASS INDEX: 27.18 KG/M2 | TEMPERATURE: 98.6 F | SYSTOLIC BLOOD PRESSURE: 110 MMHG | HEIGHT: 64 IN | DIASTOLIC BLOOD PRESSURE: 72 MMHG | HEART RATE: 79 BPM | WEIGHT: 159.2 LBS

## 2021-11-12 DIAGNOSIS — F51.01 PRIMARY INSOMNIA: ICD-10-CM

## 2021-11-12 DIAGNOSIS — Z23 ENCOUNTER FOR IMMUNIZATION: ICD-10-CM

## 2021-11-12 DIAGNOSIS — M51.37 DEGENERATION OF LUMBOSACRAL INTERVERTEBRAL DISC: ICD-10-CM

## 2021-11-12 DIAGNOSIS — Z51.81 THERAPEUTIC DRUG MONITORING: ICD-10-CM

## 2021-11-12 DIAGNOSIS — R79.89 ELEVATED SERUM CREATININE: ICD-10-CM

## 2021-11-12 DIAGNOSIS — R53.83 FATIGUE, UNSPECIFIED TYPE: ICD-10-CM

## 2021-11-12 DIAGNOSIS — Z78.0 POSTMENOPAUSAL: ICD-10-CM

## 2021-11-12 DIAGNOSIS — Z12.31 ENCOUNTER FOR SCREENING MAMMOGRAM FOR MALIGNANT NEOPLASM OF BREAST: ICD-10-CM

## 2021-11-12 DIAGNOSIS — F41.9 ANXIETY: ICD-10-CM

## 2021-11-12 DIAGNOSIS — E78.2 MIXED HYPERLIPIDEMIA: ICD-10-CM

## 2021-11-12 DIAGNOSIS — G25.81 RESTLESS LEG SYNDROME: ICD-10-CM

## 2021-11-12 DIAGNOSIS — Z00.00 MEDICARE ANNUAL WELLNESS VISIT, SUBSEQUENT: Primary | ICD-10-CM

## 2021-11-12 DIAGNOSIS — K21.9 GASTROESOPHAGEAL REFLUX DISEASE, UNSPECIFIED WHETHER ESOPHAGITIS PRESENT: ICD-10-CM

## 2021-11-12 PROBLEM — R10.13 EPIGASTRIC PAIN: Status: RESOLVED | Noted: 2021-03-05 | Resolved: 2021-11-12

## 2021-11-12 PROBLEM — R12 HEARTBURN: Status: RESOLVED | Noted: 2021-03-05 | Resolved: 2021-11-12

## 2021-11-12 LAB
BILIRUB BLD-MCNC: NEGATIVE MG/DL
CLARITY, POC: CLEAR
COLOR UR: YELLOW
GLUCOSE UR STRIP-MCNC: NEGATIVE MG/DL
KETONES UR QL: NEGATIVE
LEUKOCYTE EST, POC: NEGATIVE
NITRITE UR-MCNC: NEGATIVE MG/ML
PH UR: 5.5 [PH] (ref 5–8)
PROT UR STRIP-MCNC: NEGATIVE MG/DL
RBC # UR STRIP: NEGATIVE /UL
SP GR UR: 1 (ref 1–1.03)
UROBILINOGEN UR QL: NORMAL

## 2021-11-12 PROCEDURE — 1126F AMNT PAIN NOTED NONE PRSNT: CPT | Performed by: NURSE PRACTITIONER

## 2021-11-12 PROCEDURE — 1170F FXNL STATUS ASSESSED: CPT | Performed by: NURSE PRACTITIONER

## 2021-11-12 PROCEDURE — 90686 IIV4 VACC NO PRSV 0.5 ML IM: CPT | Performed by: NURSE PRACTITIONER

## 2021-11-12 PROCEDURE — G0439 PPPS, SUBSEQ VISIT: HCPCS | Performed by: NURSE PRACTITIONER

## 2021-11-12 PROCEDURE — G0008 ADMIN INFLUENZA VIRUS VAC: HCPCS | Performed by: NURSE PRACTITIONER

## 2021-11-12 PROCEDURE — 1159F MED LIST DOCD IN RCRD: CPT | Performed by: NURSE PRACTITIONER

## 2021-11-12 PROCEDURE — 81003 URINALYSIS AUTO W/O SCOPE: CPT | Performed by: NURSE PRACTITIONER

## 2021-11-12 RX ORDER — TEMAZEPAM 30 MG/1
30 CAPSULE ORAL NIGHTLY PRN
Qty: 30 CAPSULE | Refills: 0 | Status: SHIPPED | OUTPATIENT
Start: 2021-11-12 | End: 2021-12-16 | Stop reason: SDUPTHER

## 2021-11-12 NOTE — PATIENT INSTRUCTIONS
Medicare Wellness  Personal Prevention Plan of Service     Date of Office Visit:  2021  Encounter Provider:  YARITZA Talley  Place of Service:  [facility]  Patient Name: Hanna Luciano  :  1965    As part of the Medicare Wellness portion of your visit today, we are providing you with this personalized preventive plan of services (PPPS). This plan is based upon recommendations of the [place] Preventive Services Task Force (USPSTF) and the Advisory Committee on Immunization Practices (ACIP).    This lists the preventive care services that should be considered, and provides dates of when you are due. Items listed as completed are up-to-date and do not require any further intervention.    Health Maintenance   Topic Date Due   • DXA SCAN  2021   • INFLUENZA VACCINE  2021   • ZOSTER VACCINE (1 of 2) 2021 (Originally 10/10/2014)   • TDAP/TD VACCINES (1 - Tdap) 2022 (Originally 10/10/1983)   • LIPID PANEL  2022   • MAMMOGRAM  2022   • ANNUAL WELLNESS VISIT  2022   • PAP SMEAR  2023   • COLORECTAL CANCER SCREENING  2026   • HEPATITIS C SCREENING  Completed   • COVID-19 Vaccine  Completed   • Pneumococcal Vaccine 0-64  Aged Out       Orders Placed This Encounter   Procedures   • DEXA Bone Density Axial     Standing Status:   Future     Standing Expiration Date:   2022     Order Specific Question:   Reason for Exam:     Answer:   screening   • Mammo Screening Digital Tomosynthesis Bilateral With CAD     Standing Status:   Future     Standing Expiration Date:   2022     Order Specific Question:   Reason for Exam:     Answer:   screening   • FluLaval/Fluarix/Fluzone >6 Months ([id number])   • Comprehensive Metabolic Panel     Order Specific Question:   Release to patient     Answer:   Immediate   • Lipid Panel   • TSH     Order Specific Question:   Release to patient     Answer:   Immediate   • T4,  free     Order Specific Question:   Release to patient     Answer:   Immediate   • POC Urinalysis Dipstick, Multipro     Order Specific Question:   Release to patient     Answer:   Immediate   • CBC & Differential     Order Specific Question:   Manual Differential     Answer:   No       Return in about 3 months (around 2/12/2022), or if symptoms worsen or fail to improve, for Next scheduled follow up.      Advance Care Planning and Advance Directives     You make decisions on a daily basis - decisions about where you want to live, your career, your home, your life. Perhaps one of the most important decisions you face is your choice for future medical care. Take time to talk with your family and your healthcare team and start planning today.  Advance Care Planning is a process that can help you:  · Understand possible future healthcare decisions in light of your own experiences  · Reflect on those decision in light of your goals and values  · Discuss your decisions with those closest to you and the healthcare professionals that care for you  · Make a plan by creating a document that reflects your wishes    Surrogate Decision Maker  In the event of a medical emergency, which has left you unable to communicate or to make your own decisions, you would need someone to make decisions for you.  It is important to discuss your preferences for medical treatment with this person while you are in good health.     Qualities of a surrogate decision maker:  • Willing to take on this role and responsibility  • Knows what you want for future medical care  • Willing to follow your wishes even if they don't agree with them  • Able to make difficult medical decisions under stressful circumstances    Advance Directives  These are legal documents you can create that will guide your healthcare team and decision maker(s) when needed. These documents can be stored in the electronic medical record.    · Living Will - a legal document to  guide your care if you have a terminal condition or a serious illness and are unable to communicate. States vary by statute in document names/types, but most forms may include one or more of the following:        -  Directions regarding life-prolonging treatments        -  Directions regarding artificially provided nutrition/hydration        -  Choosing a healthcare decision maker        -  Direction regarding organ/tissue donation    · Durable Power of  for Healthcare - this document names an -in-fact to make medical decisions for you, but it may also allow this person to make personal and financial decisions for you. Please seek the advice of an  if you need this type of document.    **Advance Directives are not required and no one may discriminate against you if you do not sign one.    Medical Orders  Many states allow specific forms/orders signed by your physician to record your wishes for medical treatment in your current state of health. This form, signed in personal communication with your physician, addresses resuscitation and other medical interventions that you may or may not want.      For more information or to schedule a time with a [facility] Advance Care Planning Facilitator contact: [facility] [place]/ACP or call [phone] and someone will contact you directly.          Mediterranean Diet  A Mediterranean diet refers to food and lifestyle choices that are based on the traditions of countries located on the [place]. This way of eating has been shown to help prevent certain conditions and improve outcomes for people who have chronic diseases, like kidney disease and heart disease.  What are tips for following this plan?  Lifestyle  · Cook and eat meals together with your family, when possible.  · Drink enough fluid to keep your urine clear or pale yellow.  · Be physically active every day. This includes:  ? Aerobic exercise like running or swimming.  ? Leisure  activities like gardening, walking, or housework.  · Get 7-8 hours of sleep each night.  · If recommended by your health care provider, drink red wine in moderation. This means 1 glass a day for nonpregnant women and 2 glasses a day for men. A glass of wine equals 5 oz (150 mL).  Reading food labels    · Check the serving size of packaged foods. For foods such as rice and pasta, the serving size refers to the amount of cooked product, not dry.  · Check the total fat in packaged foods. Avoid foods that have saturated fat or trans fats.  · Check the ingredients list for added sugars, such as corn syrup.    Shopping  · At the grocery store, buy most of your food from the areas near the walls of the store. This includes:  ? Fresh fruits and vegetables (produce).  ? Grains, beans, nuts, and seeds. Some of these may be available in unpackaged forms or large amounts (in bulk).  ? Fresh seafood.  ? Poultry and eggs.  ? Low-fat dairy products.  · Buy whole ingredients instead of prepackaged foods.  · Buy fresh fruits and vegetables in-season from local [employer] markets.  · Buy frozen fruits and vegetables in resealable bags.  · If you do not have access to quality fresh seafood, buy precooked frozen shrimp or canned fish, such as tuna, salmon, or sardines.  · Buy small amounts of raw or cooked vegetables, salads, or olives from the deli or salad bar at your store.  · Stock your pantry so you always have certain foods on hand, such as olive oil, canned tuna, canned tomatoes, rice, pasta, and beans.  Cooking  · Cook foods with extra-virgin olive oil instead of using butter or other vegetable oils.  · Have meat as a side dish, and have vegetables or grains as your main dish. This means having meat in small portions or adding small amounts of meat to foods like pasta or stew.  · Use beans or vegetables instead of meat in common dishes like chili or lasagna.  · Levasy with different cooking methods. Try roasting or broiling  vegetables instead of steaming or sautéeing them.  · Add frozen vegetables to soups, stews, pasta, or rice.  · Add nuts or seeds for added healthy fat at each meal. You can add these to yogurt, salads, or vegetable dishes.  · Marinate fish or vegetables using olive oil, lemon juice, garlic, and fresh herbs.  Meal planning    · Plan to eat 1 vegetarian meal one day each week. Try to work up to 2 vegetarian meals, if possible.  · Eat seafood 2 or more times a week.  · Have healthy snacks readily available, such as:  ? Vegetable sticks with hummus.  ? Greek yogurt.  ? Fruit and nut trail mix.  · Eat balanced meals throughout the week. This includes:  ? Fruit: 2-3 servings a day  ? Vegetables: 4-5 servings a day  ? Low-fat dairy: 2 servings a day  ? Fish, poultry, or lean meat: 1 serving a day  ? Beans and legumes: 2 or more servings a week  ? Nuts and seeds: 1-2 servings a day  ? Whole grains: 6-8 servings a day  ? Extra-virgin olive oil: 3-4 servings a day  · Limit red meat and sweets to only a few servings a month    What are my food choices?  · Mediterranean diet  ? Recommended  § Grains: Whole-grain pasta. Brown rice. Bulgar wheat. Polenta. Couscous. Whole-wheat bread. Oatmeal. Quinoa.  § Vegetables: Artichokes. Beets. Broccoli. Cabbage. Carrots. Eggplant. Green beans. Chard. Kale. Spinach. Onions. Leeks. Peas. Squash. Tomatoes. Peppers. Radishes.  § Fruits: Apples. Apricots. Avocado. Berries. Bananas. Cherries. Dates. Figs. Grapes. [place]. Melon. Oranges. Peaches. Plums. Pomegranate.  § Meats and other protein foods: Beans. Almonds. Sunflower seeds. Pine nuts. Peanuts. Cod. [place]. Scallops. Shrimp. Tuna. Tilapia. Clams. Oysters. Eggs.  § Dairy: Low-fat milk. Cheese. Greek yogurt.  § Beverages: Water. Red wine. Herbal tea.  § Fats and oils: Extra virgin olive oil. Avocado oil. Grape seed oil.  § Sweets and desserts: Greek yogurt with honey. Baked apples. Poached pears. Trail mix.  § Seasoning and other foods:  Basil. Cilantro. Coriander. Cumin. Mint. Parsley. Justus. Rosemary. Tarragon. Garlic. Oregano. Thyme. Pepper. Balsalmic vinegar. Tahini. Hummus. Tomato sauce. Olives. Mushrooms.  ? Limit these  § Grains: Prepackaged pasta or rice dishes. Prepackaged cereal with added sugar.  § Vegetables: Deep fried potatoes (french fries).  § Fruits: Fruit canned in syrup.  § Meats and other protein foods: Beef. Pork. Lamb. Poultry with skin. Hot dogs. Brown.  § Dairy: Ice cream. Sour cream. Whole milk.  § Beverages: Juice. Sugar-sweetened soft drinks. Beer. Liquor and spirits.  § Fats and oils: Butter. Canola oil. Vegetable oil. Beef fat (tallow). Lard.  § Sweets and desserts: Cookies. Cakes. Pies. Candy.  § Seasoning and other foods: Mayonnaise. Premade sauces and marinades.  The items listed may not be a complete list. Talk with your dietitian about what dietary choices are right for you.  Summary  · The Mediterranean diet includes both food and lifestyle choices.  · Eat a variety of fresh fruits and vegetables, beans, nuts, seeds, and whole grains.  · Limit the amount of red meat and sweets that you eat.  · Talk with your health care provider about whether it is safe for you to drink red wine in moderation. This means 1 glass a day for nonpregnant women and 2 glasses a day for men. A glass of wine equals 5 oz (150 mL).  This information is not intended to replace advice given to you by your health care provider. Make sure you discuss any questions you have with your health care provider.  Document Revised: 08/17/2017 Document Reviewed: 08/10/2017  [employer] Patient Education © 2020 [employer]

## 2021-11-12 NOTE — PROGRESS NOTES
The ABCs of the Annual Wellness Visit  Subsequent Medicare Wellness Visit    Chief Complaint   Patient presents with   • Medicare Wellness-subsequent     non fasting   • Med Refill     dioni - temazepam      Subjective    History of Present Illness:  Hanna Luciano is a 56 y.o. female who presents for a Subsequent Medicare Wellness Visit.    The following portions of the patient's history were reviewed and   updated as appropriate: allergies, current medications, past family history, past medical history, past social history, past surgical history and problem list.    Compared to one year ago, the patient feels her physical   health is better.    Compared to one year ago, the patient feels her mental   health is better.      CONTROLLED SUBSTANCE TRACKING 1/25/2021 5/10/2021 8/13/2021 11/12/2021   Last Dioni 1/25/2021 5/10/2021 8/13/2021 11/12/2021   Report Number Karyn reviewed through EPIC reviewed by Karyn Claros - reviewed through [facility]   Last UDS 1/25/2021 1/25/2021 1/25/2021 1/25/2021   Last Controlled Substance Agreement 1/25/2021 1/25/2021 1/25/2021 1/25/2021       Recent Hospitalizations:  She was not admitted to the hospital during the last year.       Current Medical Providers:  Patient Care Team:  Karyn Claros APRN as PCP - General (Nurse Practitioner)  April Tolliver APRN as Nurse Practitioner (Otolaryngology)  Harpreet Rene MD as Consulting Physician (Otolaryngology)  Isa Vora APRN as Referring Physician (Family Medicine)  Tyler Howard MD as Consulting Physician (Pain Medicine)    Outpatient Medications Prior to Visit   Medication Sig Dispense Refill   • acetaminophen (TYLENOL) 500 MG tablet Take 500 mg by mouth As Needed for Mild Pain .     • acetaminophen-codeine (TYLENOL #3) 300-30 MG per tablet Take 1 tablet by mouth Every 6 (Six) Hours As Needed for Moderate Pain . 90 tablet 0   • azelastine (ASTEPRO) 0.15 % solution nasal spray USE 2 SPRAYS IN EACH  NOSTRIL TWICE DAILY 30 mL 0   • buPROPion XL (WELLBUTRIN XL) 300 MG 24 hr tablet TAKE 1 TABLET BY MOUTH EVERY DAY 30 tablet 5   • CALCIUM PO Take  by mouth.     • Diclofenac Sodium (VOLTAREN) 1 % gel gel      • famotidine (PEPCID) 20 MG tablet TAKE 1 TABLET BY MOUTH TWO TIMES A DAY. 60 tablet 5   • fluticasone (FLONASE) 50 MCG/ACT nasal spray      • gabapentin (NEURONTIN) 800 MG tablet Take 1 tablet by mouth 3 (Three) Times a Day. 90 tablet 2   • methocarbamol (ROBAXIN) 750 MG tablet Take 1 tablet by mouth 3 (Three) Times a Day As Needed for Muscle Spasms. 90 tablet 5   • rosuvastatin (CRESTOR) 20 MG tablet TAKE 1 TABLET BY MOUTH EVERY NIGHT. 90 tablet 2   • sucralfate (Carafate) 1 g tablet Take 1 tablet by mouth 4 (Four) Times a Day. 120 tablet 5   • traMADol (Ultram) 50 MG tablet      • temazepam (RESTORIL) 30 MG capsule Take 1 capsule by mouth Every Night. No more refills till after your 11/12/2021 appointment with Karyn Claros. 30 capsule 0   • lansoprazole (PREVACID) 30 MG capsule TAKE 1 CAPSULE BY MOUTH DAILY 30 capsule 5   • methylPREDNISolone (MEDROL) 4 MG dose pack Take as directed on package instructions. 21 tablet 0     No facility-administered medications prior to visit.       Opioid medication/s are on active medication list.  and I have evaluated her active treatment plan and pain score trends (see table).  Vitals:    11/12/21 0858   PainSc: 0-No pain     I have reviewed the chart for potential of high risk medication and harmful drug interactions in the elderly.            Aspirin is not on active medication list.  Aspirin use is not indicated based on review of current medical condition/s. Risk of harm outweighs potential benefits.  .    Patient Active Problem List   Diagnosis   • On long term drug therapy   • Anxiety   • Degeneration of lumbosacral intervertebral disc   • Gastroesophageal reflux disease   • Hyperlipidemia   • Insomnia   • Low back pain   • Lumbar post-laminectomy syndrome   •  "Depression   • Trochanteric bursitis of left hip   • Pain of left sacroiliac joint   • Trochanteric bursitis of right hip   • Pain of both hip joints   • Restless leg syndrome   • Other dysphagia   • Diarrhea   • Adenomatous colon polyp   • Former smoker   • Overweight with body mass index (BMI) of 26 to 26.9 in adult     Advance Care Planning  Advance Directive is not on file.  ACP discussion was not held with patient. Information included in After Visit Summary.     Review of Systems   Constitutional: Negative for fever.   Respiratory: Negative for shortness of breath.    Cardiovascular: Negative for chest pain.   Gastrointestinal: Negative for constipation and diarrhea.   Musculoskeletal: Positive for back pain.        Chronic - sees pain mgmt.   Neurological: Negative for dizziness and light-headedness.        Objective    Vitals:    11/12/21 0858   BP: 110/72   BP Location: Right arm   Patient Position: Sitting   Cuff Size: Large Adult   Pulse: 79   Temp: 98.6 °F (37 °C)   TempSrc: Temporal   SpO2: 97%   Weight: 72.2 kg (159 lb 3.2 oz)   Height: 162.6 cm (64\")   PainSc: 0-No pain     BMI Readings from Last 1 Encounters:   11/12/21 27.33 kg/m²   BMI is above normal parameters. Recommendations include: educational material    Does the patient have evidence of cognitive impairment? No    Physical Exam  Vitals reviewed.   Constitutional:       Appearance: She is well-developed.   HENT:      Right Ear: Tympanic membrane, ear canal and external ear normal.      Left Ear: Tympanic membrane, ear canal and external ear normal.      Ears:      Comments: Patient is deaf  Neck:      Vascular: No carotid bruit.   Cardiovascular:      Rate and Rhythm: Normal rate and regular rhythm.      Heart sounds: Normal heart sounds.   Pulmonary:      Effort: Pulmonary effort is normal.      Breath sounds: Normal breath sounds.   Chest:   Breasts:      Right: Normal.      Left: Normal.       Abdominal:      General: Abdomen is flat. " Bowel sounds are normal.      Palpations: Abdomen is soft.   Genitourinary:     Labia:         Right: No rash, tenderness, lesion or injury.         Left: No rash, tenderness, lesion or injury.       Cervix: Normal.      Uterus: Normal.       Adnexa: Right adnexa normal and left adnexa normal.   Skin:     General: Skin is warm and dry.   Neurological:      Mental Status: She is alert and oriented to person, place, and time.   Psychiatric:         Behavior: Behavior normal.                 HEALTH RISK ASSESSMENT    Smoking Status:  Social History     Tobacco Use   Smoking Status Former Smoker   • Packs/day: 0.25   • Years: 3.00   • Pack years: 0.75   • Types: Cigarettes, Electronic Cigarette   • Quit date: 3/5/2000   • Years since quittin.7   Smokeless Tobacco Never Used   Tobacco Comment    Cigarettes     Alcohol Consumption:  Social History     Substance and Sexual Activity   Alcohol Use Never     Fall Risk Screen:    [facility] Fall Risk Assessment has not been completed.    Depression Screening:  PHQ-2/PHQ-9 Depression Screening 2021   Little interest or pleasure in doing things 0   Feeling down, depressed, or hopeless 0   Total Score 0       Health Habits and Functional and Cognitive Screening:  Functional & Cognitive Status 2021   Do you have difficulty preparing food and eating? No   Do you have difficulty bathing yourself, getting dressed or grooming yourself? No   Do you have difficulty using the toilet? No   Do you have difficulty moving around from place to place? No   Do you have trouble with steps or getting out of a bed or a chair? No   Current Diet Well Balanced Diet   Dental Exam Up to date   Eye Exam Up to date   Exercise (times per week) 0 times per week   Current Exercises Include No Regular Exercise   Current Exercise Activities Include -   Do you need help using the phone?  No   Are you deaf or do you have serious difficulty hearing?  Yes   Do you need help with transportation? No    Do you need help shopping? No   Do you need help preparing meals?  No   Do you need help with housework?  No   Do you need help with laundry? No   Do you need help taking your medications? No   Do you need help managing money? No   Do you ever drive or ride in a car without wearing a seat belt? No   Have you felt unusual stress, anger or loneliness in the last month? No   Who do you live with? Alone   If you need help, do you have trouble finding someone available to you? No   Have you been bothered in the last four weeks by sexual problems? No   Do you have difficulty concentrating, remembering or making decisions? Yes       Age-appropriate Screening Schedule:  Refer to the list below for future screening recommendations based on patient's age, sex and/or medical conditions. Orders for these recommended tests are listed in the plan section. The patient has been provided with a written plan.    Health Maintenance   Topic Date Due   • DXA SCAN  07/25/2021   • INFLUENZA VACCINE  08/01/2021   • ZOSTER VACCINE (1 of 2) 11/12/2021 (Originally 10/10/2014)   • TDAP/TD VACCINES (1 - Tdap) 11/12/2022 (Originally 10/10/1983)   • LIPID PANEL  01/25/2022   • MAMMOGRAM  11/09/2022   • PAP SMEAR  11/09/2023              Assessment/Plan   CMS Preventative Services Quick Reference  Risk Factors Identified During Encounter  Immunizations Discussed/Encouraged (specific Immunizations; Influenza  Obesity/Overweight   The above risks/problems have been discussed with the patient.  Follow up actions/plans if indicated are seen below in the Assessment/Plan Section.  Pertinent information has been shared with the patient in the After Visit Summary.    Diagnoses and all orders for this visit:    1. Medicare annual wellness visit, subsequent (Primary)  -     CBC & Differential  -     Comprehensive Metabolic Panel  -     Lipid Panel  -     POC Urinalysis Dipstick, Multipro  -     TSH  -     T4, free  -     FluLaval/Fluarix/Fluzone >6 Months  ([id number])  -     DEXA Bone Density Axial; Future  -     Mammo Screening Digital Tomosynthesis Bilateral With CAD; Future    2. Anxiety  -     CBC & Differential  -     Comprehensive Metabolic Panel  -     POC Urinalysis Dipstick, Multipro  -     TSH  -     T4, free    3. Gastroesophageal reflux disease, unspecified whether esophagitis present  -     CBC & Differential  -     Comprehensive Metabolic Panel    4. Mixed hyperlipidemia  -     Lipid Panel    5. Primary insomnia  -     Comprehensive Metabolic Panel  -     temazepam (RESTORIL) 30 MG capsule; Take 1 capsule by mouth At Night As Needed for Sleep.  Dispense: 30 capsule; Refill: 0    6. Restless leg syndrome  -     CBC & Differential  -     Comprehensive Metabolic Panel    7. Degeneration of lumbosacral intervertebral disc    8. Encounter for screening mammogram for malignant neoplasm of breast  -     Mammo Screening Digital Tomosynthesis Bilateral With CAD; Future    9. Postmenopausal  -     POC Urinalysis Dipstick, Multipro  -     DEXA Bone Density Axial; Future    10. Encounter for immunization  -     FluLaval/Fluarix/Fluzone >6 Months ([id number])    11. Fatigue, unspecified type  -     CBC & Differential  -     Comprehensive Metabolic Panel  -     POC Urinalysis Dipstick, Multipro  -     TSH  -     T4, free    12. Therapeutic drug monitoring    Shine reviewed and appropriate. Contract and UDS up to date.          [id number] - [facility] - was used for [facility] interpretation for this visit.     Follow Up:   Return in about 3 months (around 2/12/2022), or if symptoms worsen or fail to improve, for Next scheduled follow up.     Karyn Claros, YARITZA 11/12/21    An After Visit Summary and PPPS were made available to the patient.

## 2021-11-13 LAB
ALBUMIN SERPL-MCNC: 4.3 G/DL (ref 3.5–5.2)
ALBUMIN/GLOB SERPL: 2 G/DL
ALP SERPL-CCNC: 82 U/L (ref 39–117)
ALT SERPL-CCNC: 10 U/L (ref 1–33)
AST SERPL-CCNC: 15 U/L (ref 1–32)
BASOPHILS # BLD AUTO: 0.04 10*3/MM3 (ref 0–0.2)
BASOPHILS NFR BLD AUTO: 0.8 % (ref 0–1.5)
BILIRUB SERPL-MCNC: 0.4 MG/DL (ref 0–1.2)
BUN SERPL-MCNC: 13 MG/DL (ref 6–20)
BUN/CREAT SERPL: 11.4 (ref 7–25)
CALCIUM SERPL-MCNC: 9.6 MG/DL (ref 8.6–10.5)
CHLORIDE SERPL-SCNC: 100 MMOL/L (ref 98–107)
CHOLEST SERPL-MCNC: 154 MG/DL (ref 0–200)
CO2 SERPL-SCNC: 29.5 MMOL/L (ref 22–29)
CREAT SERPL-MCNC: 1.14 MG/DL (ref 0.57–1)
EOSINOPHIL # BLD AUTO: 0.18 10*3/MM3 (ref 0–0.4)
EOSINOPHIL NFR BLD AUTO: 3.7 % (ref 0.3–6.2)
ERYTHROCYTE [DISTWIDTH] IN BLOOD BY AUTOMATED COUNT: 12.3 % (ref 12.3–15.4)
GLOBULIN SER CALC-MCNC: 2.2 GM/DL
GLUCOSE SERPL-MCNC: 74 MG/DL (ref 65–99)
HCT VFR BLD AUTO: 40.5 % (ref 34–46.6)
HDLC SERPL-MCNC: 76 MG/DL (ref 40–60)
HGB BLD-MCNC: 12.6 G/DL (ref 12–15.9)
IMM GRANULOCYTES # BLD AUTO: 0.01 10*3/MM3 (ref 0–0.05)
IMM GRANULOCYTES NFR BLD AUTO: 0.2 % (ref 0–0.5)
LDLC SERPL CALC-MCNC: 61 MG/DL (ref 0–100)
LYMPHOCYTES # BLD AUTO: 1.2 10*3/MM3 (ref 0.7–3.1)
LYMPHOCYTES NFR BLD AUTO: 24.8 % (ref 19.6–45.3)
MCH RBC QN AUTO: 30.9 PG (ref 26.6–33)
MCHC RBC AUTO-ENTMCNC: 31.1 G/DL (ref 31.5–35.7)
MCV RBC AUTO: 99.3 FL (ref 79–97)
MONOCYTES # BLD AUTO: 0.47 10*3/MM3 (ref 0.1–0.9)
MONOCYTES NFR BLD AUTO: 9.7 % (ref 5–12)
NEUTROPHILS # BLD AUTO: 2.94 10*3/MM3 (ref 1.7–7)
NEUTROPHILS NFR BLD AUTO: 60.8 % (ref 42.7–76)
NRBC BLD AUTO-RTO: 0 /100 WBC (ref 0–0.2)
PLATELET # BLD AUTO: 222 10*3/MM3 (ref 140–450)
POTASSIUM SERPL-SCNC: 4.5 MMOL/L (ref 3.5–5.2)
PROT SERPL-MCNC: 6.5 G/DL (ref 6–8.5)
RBC # BLD AUTO: 4.08 10*6/MM3 (ref 3.77–5.28)
SODIUM SERPL-SCNC: 137 MMOL/L (ref 136–145)
T4 FREE SERPL-MCNC: 0.94 NG/DL (ref 0.93–1.7)
TRIGL SERPL-MCNC: 94 MG/DL (ref 0–150)
TSH SERPL DL<=0.005 MIU/L-ACNC: 2.41 UIU/ML (ref 0.27–4.2)
VLDLC SERPL CALC-MCNC: 17 MG/DL (ref 5–40)
WBC # BLD AUTO: 4.84 10*3/MM3 (ref 3.4–10.8)

## 2021-11-16 DIAGNOSIS — N28.9 FUNCTION KIDNEY DECREASED: Primary | ICD-10-CM

## 2021-11-19 DIAGNOSIS — K21.9 GASTROESOPHAGEAL REFLUX DISEASE: ICD-10-CM

## 2021-11-19 NOTE — TELEPHONE ENCOUNTER
Rx Refill Note  Requested Prescriptions     Pending Prescriptions Disp Refills   • lansoprazole (PREVACID) 30 MG capsule [Pharmacy Med Name: LANSOPRAZOLE 30MG DR] 30 capsule 4     Sig: TAKE 1 CAPSULE BY MOUTH DAILY      Last office visit with prescribing clinician: 11/12/2021      Next office visit with prescribing clinician: 2/11/2022   CPE done 11/2021       {TIP  Is Refill Pharmacy correct?: yes    Juju Sawyer MA  11/19/21, 16:30 CST

## 2021-11-20 RX ORDER — LANSOPRAZOLE 30 MG/1
30 CAPSULE, DELAYED RELEASE ORAL DAILY
Qty: 30 CAPSULE | Refills: 4 | Status: SHIPPED | OUTPATIENT
Start: 2021-11-20 | End: 2022-05-11 | Stop reason: SDUPTHER

## 2021-11-30 ENCOUNTER — LAB (OUTPATIENT)
Dept: FAMILY MEDICINE CLINIC | Facility: CLINIC | Age: 56
End: 2021-11-30

## 2021-12-01 LAB
ALBUMIN SERPL-MCNC: 4.4 G/DL (ref 3.5–5.2)
ALBUMIN/GLOB SERPL: 2.1 G/DL
ALP SERPL-CCNC: 87 U/L (ref 39–117)
ALT SERPL-CCNC: 15 U/L (ref 1–33)
AST SERPL-CCNC: 20 U/L (ref 1–32)
BILIRUB SERPL-MCNC: 0.4 MG/DL (ref 0–1.2)
BUN SERPL-MCNC: 14 MG/DL (ref 6–20)
BUN/CREAT SERPL: 14 (ref 7–25)
CALCIUM SERPL-MCNC: 9.7 MG/DL (ref 8.6–10.5)
CHLORIDE SERPL-SCNC: 99 MMOL/L (ref 98–107)
CO2 SERPL-SCNC: 29.2 MMOL/L (ref 22–29)
CREAT SERPL-MCNC: 1 MG/DL (ref 0.57–1)
GLOBULIN SER CALC-MCNC: 2.1 GM/DL
GLUCOSE SERPL-MCNC: 90 MG/DL (ref 65–99)
POTASSIUM SERPL-SCNC: 3.9 MMOL/L (ref 3.5–5.2)
PROT SERPL-MCNC: 6.5 G/DL (ref 6–8.5)
SODIUM SERPL-SCNC: 138 MMOL/L (ref 136–145)

## 2021-12-07 ENCOUNTER — TELEPHONE (OUTPATIENT)
Dept: NEUROSURGERY | Facility: CLINIC | Age: 56
End: 2021-12-07

## 2021-12-07 NOTE — TELEPHONE ENCOUNTER
LM W/ SERVICE - WE HAVE DOWLOADED THE CD FROM [facility] - WANT TO KNOW IF SHE WANTS THE CD BACK OR YOU WANT US TO DESTROY.   LF MY DIRECT PHONE NO TO CALL BACK.

## 2021-12-11 NOTE — TELEPHONE ENCOUNTER
Pt was advised...she would like to have MRI done 1st and then she would like to have referral placed to Dr. Mccracken.   normal sinus rhythm

## 2021-12-15 ENCOUNTER — TELEPHONE (OUTPATIENT)
Dept: NEUROSURGERY | Facility: CLINIC | Age: 56
End: 2021-12-15

## 2021-12-16 DIAGNOSIS — F51.01 PRIMARY INSOMNIA: ICD-10-CM

## 2021-12-16 RX ORDER — TEMAZEPAM 30 MG/1
30 CAPSULE ORAL NIGHTLY PRN
Qty: 30 CAPSULE | Refills: 0 | OUTPATIENT
Start: 2021-12-16

## 2021-12-16 RX ORDER — TEMAZEPAM 30 MG/1
30 CAPSULE ORAL NIGHTLY PRN
Qty: 30 CAPSULE | Refills: 0 | Status: SHIPPED | OUTPATIENT
Start: 2021-12-16 | End: 2022-01-12 | Stop reason: SDUPTHER

## 2021-12-16 NOTE — TELEPHONE ENCOUNTER
Drug therapy appt 11/12/2021  Contract, UDS and Inocente urine done 01/25/2021      Rx Refill Note  Requested Prescriptions     Pending Prescriptions Disp Refills   • temazepam (RESTORIL) 30 MG capsule 30 capsule 0     Sig: Take 1 capsule by mouth At Night As Needed for Sleep.      Last office visit with prescribing clinician: 11/12/2021      Next office visit with prescribing clinician: 2/11/2022            Juju Walsh MA  12/16/21, 09:03 CST

## 2021-12-20 RX ORDER — AZELASTINE HCL 205.5 UG/1
SPRAY NASAL
Qty: 30 ML | Refills: 0 | Status: SHIPPED | OUTPATIENT
Start: 2021-12-20 | End: 2022-02-07

## 2021-12-20 NOTE — TELEPHONE ENCOUNTER
Rx Refill Note  Requested Prescriptions     Pending Prescriptions Disp Refills   • azelastine (ASTEPRO) 0.15 % solution nasal spray [Pharmacy Med Name: AZELASTINE SPR 0.15%] 30 mL 0     Sig: INSTILL 2 SPRAYS IN EACH NOSTRIL TWICE DAILY      Last office visit with prescribing clinician: 11/12/2021      Next office visit with prescribing clinician: 2/11/2022    Last refill: 10/27/21      Trish Castaneda  12/20/21, 11:17 CST

## 2021-12-21 RX ORDER — BUPROPION HYDROCHLORIDE 300 MG/1
TABLET ORAL
Qty: 30 TABLET | Refills: 4 | Status: SHIPPED | OUTPATIENT
Start: 2021-12-21 | End: 2022-06-06

## 2022-01-12 DIAGNOSIS — F51.01 PRIMARY INSOMNIA: ICD-10-CM

## 2022-01-12 RX ORDER — ROSUVASTATIN CALCIUM 20 MG/1
20 TABLET, COATED ORAL NIGHTLY
Qty: 90 TABLET | Refills: 2 | Status: SHIPPED | OUTPATIENT
Start: 2022-01-12 | End: 2022-11-03 | Stop reason: SDUPTHER

## 2022-01-12 NOTE — TELEPHONE ENCOUNTER
Caller: Hanna Luciano    Relationship: Self    Best call back number:  [phone] (F)     Requested Prescriptions:   Requested Prescriptions     Pending Prescriptions Disp Refills   • sucralfate (Carafate) 1 g tablet 120 tablet 5     Sig: Take 1 tablet by mouth 4 (Four) Times a Day.   • temazepam (RESTORIL) 30 MG capsule 30 capsule 0     Sig: Take 1 capsule by mouth At Night As Needed for Sleep.        Pharmacy where request should be sent: [facility] [address] [phone] [facility] [phone]      Additional details provided by patient: 1 left     Does the patient have less than a 3 day supply:  [] Yes  [] No    Aguilar Malone Rep   01/12/22 12:30 CST

## 2022-01-12 NOTE — TELEPHONE ENCOUNTER
Drug therapy appt 11/12/2021  Contract, UDS and Inocente urine done 01/25/2021    Rx Refill Note  Requested Prescriptions     Pending Prescriptions Disp Refills   • sucralfate (Carafate) 1 g tablet 120 tablet 5     Sig: Take 1 tablet by mouth 4 (Four) Times a Day.   • temazepam (RESTORIL) 30 MG capsule 30 capsule 0     Sig: Take 1 capsule by mouth At Night As Needed for Sleep.      Last office visit with prescribing clinician: 11/12/2021      Next office visit with prescribing clinician: 2/11/2022            Juju Walsh MA  01/12/22, 12:34 CST

## 2022-01-12 NOTE — TELEPHONE ENCOUNTER
Rx Refill Note  Requested Prescriptions     Pending Prescriptions Disp Refills   • rosuvastatin (CRESTOR) 20 MG tablet [Pharmacy Med Name: ROSUVASTATIN 20MG] 90 tablet 1     Sig: TAKE 1 TABLET BY MOUTH EVERY NIGHT.      Last office visit with prescribing clinician: 11/12/2021      Next office visit with prescribing clinician: 2/11/2022            Juju Walsh MA  01/12/22, 12:59 CST

## 2022-01-14 RX ORDER — SUCRALFATE 1 G/1
1 TABLET ORAL 4 TIMES DAILY
Qty: 120 TABLET | Refills: 5 | Status: SHIPPED | OUTPATIENT
Start: 2022-01-14 | End: 2022-09-12

## 2022-01-14 RX ORDER — TEMAZEPAM 30 MG/1
30 CAPSULE ORAL NIGHTLY PRN
Qty: 30 CAPSULE | Refills: 0 | Status: SHIPPED | OUTPATIENT
Start: 2022-01-14 | End: 2022-02-11 | Stop reason: SDUPTHER

## 2022-02-07 RX ORDER — FLUTICASONE PROPIONATE 50 MCG
SPRAY, SUSPENSION (ML) NASAL
Qty: 16 G | Refills: 4 | Status: SHIPPED | OUTPATIENT
Start: 2022-02-07 | End: 2022-10-06 | Stop reason: SDUPTHER

## 2022-02-07 RX ORDER — AZELASTINE HCL 205.5 UG/1
SPRAY NASAL
Qty: 30 ML | Refills: 0 | Status: SHIPPED | OUTPATIENT
Start: 2022-02-07 | End: 2022-04-01

## 2022-02-11 ENCOUNTER — OFFICE VISIT (OUTPATIENT)
Dept: FAMILY MEDICINE CLINIC | Facility: CLINIC | Age: 57
End: 2022-02-11

## 2022-02-11 VITALS
TEMPERATURE: 98.6 F | OXYGEN SATURATION: 99 % | WEIGHT: 170.2 LBS | SYSTOLIC BLOOD PRESSURE: 110 MMHG | HEIGHT: 64 IN | DIASTOLIC BLOOD PRESSURE: 74 MMHG | HEART RATE: 75 BPM | BODY MASS INDEX: 29.06 KG/M2

## 2022-02-11 DIAGNOSIS — Z51.81 THERAPEUTIC DRUG MONITORING: Primary | ICD-10-CM

## 2022-02-11 DIAGNOSIS — F51.01 PRIMARY INSOMNIA: ICD-10-CM

## 2022-02-11 PROCEDURE — 99213 OFFICE O/P EST LOW 20 MIN: CPT | Performed by: NURSE PRACTITIONER

## 2022-02-11 RX ORDER — TEMAZEPAM 30 MG/1
30 CAPSULE ORAL NIGHTLY PRN
Qty: 30 CAPSULE | Refills: 0 | Status: SHIPPED | OUTPATIENT
Start: 2022-02-11 | End: 2022-03-11

## 2022-02-11 RX ORDER — HYDROCODONE BITARTRATE AND ACETAMINOPHEN 5; 325 MG/1; MG/1
TABLET ORAL
COMMUNITY
Start: 2022-01-12 | End: 2022-04-25

## 2022-02-11 NOTE — PROGRESS NOTES
CC:  Controlled medication monitoring    History:  Hanna Luciano is a 56 y.o. female who presents today for evaluation of the above problems.      Patient is here for refill of her temazepam.  She states that this medication continues to work very well for her RLS and helping her sleep. She states she does not want to make any changes and is not having any issues.       CONTROLLED SUBSTANCE TRACKING 1/25/2021 5/10/2021 8/13/2021 11/12/2021 2/11/2022   Last Shine 1/25/2021 5/10/2021 8/13/2021 11/12/2021 2/11/2022   Report Number Karyn reviewed through EPIC reviewed by Karyn Claros - reviewed through EPIC reviewed through  [facility]   Last UDS 1/25/2021 1/25/2021 1/25/2021 1/25/2021 2/11/2022   Last Controlled Substance Agreement 1/25/2021 1/25/2021 1/25/2021 1/25/2021 2/11/2022       HPI  ROS:  Review of Systems   Psychiatric/Behavioral: Negative for sleep disturbance. The patient is not nervous/anxious.        Allergies   Allergen Reactions   • Sulfamethoxazole-Trimethoprim Anaphylaxis   • Atorvastatin Unknown (See Comments)     Pt unsure of allergy   • Baclofen GI Intolerance   • Diphenhydramine Other (See Comments)     Pt unsure of allergy   • Lorazepam Other (See Comments)     Pt unsure of allergy   • Morphine GI Intolerance   • Penicillins Unknown (See Comments)     Pt unsure of allergy   • Soma [Carisoprodol] Unknown (See Comments)     Pt unsure of allergy   • Valproic Acid Unknown (See Comments)     Pt unsure of allergy     Past Medical History:   Diagnosis Date   • ADHD (attention deficit hyperactivity disorder) 2003    From [facility] in [place]   • Allergic Since 2001   • Arthritis    • Colon polyp 2014 & 2021    Removed 4th polp   • Depression    • Family history of colon cancer    • Generalized headaches    • GERD (gastroesophageal reflux disease)    • History of colon polyps    • HL (hearing loss) Since Birth    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   • Hyperlipidemia    • Low back pain 2003    Major Back  surgery   • Osteopenia    • Tuberculosis 1998    Not active     Past Surgical History:   Procedure Laterality Date   • APPENDECTOMY     • BACK SURGERY  2003   • CHOLECYSTECTOMY     • COLONOSCOPY N/A 4/28/2021    Procedure: COLONOSCOPY WITH ANESTHESIA;  Surgeon: Sandra Nguyen MD;  Location: [facility] END[facility];  Service: Gastroenterology;  Laterality: N/A;  preop; epogastric pain  postop; polyps   PCP Andres Martinez    • ENDOSCOPY N/A 4/28/2021    Procedure: ESOPHAGOGASTRODUODENOSCOPY WITH ANESTHESIA;  Surgeon: Sandra Nguyen MD;  Location: [facility] ENDOSCOPY;  Service: Gastroenterology;  Laterality: N/A;  preop; dysphagia  postop; normal  PCP Kirti Martinez    • EYE SURGERY Left 1965    left eyebrow   • OOPHORECTOMY Left 2000   • SPINE SURGERY       Family History   Problem Relation Age of Onset   • Hyperlipidemia Other    • Heart disease Other    • Hypertension Other    • Diabetes Other    • Cancer Other    • Arthritis Other    • Mental illness Other    • Colon cancer Mother 77   • Alcohol abuse Mother    • No Known Problems Father    • Colon polyps Neg Hx    • Esophageal cancer Neg Hx    • Liver cancer Neg Hx    • Liver disease Neg Hx    • Rectal cancer Neg Hx    • Stomach cancer Neg Hx       reports that she quit smoking about 21 years ago. Her smoking use included cigarettes and electronic cigarette. She has a 0.75 pack-year smoking history. She has never used smokeless tobacco. She reports that she does not drink alcohol and does not use drugs.      Current Outpatient Medications:   •  acetaminophen (TYLENOL) 500 MG tablet, Take 500 mg by mouth As Needed for Mild Pain ., Disp: , Rfl:   •  acetaminophen-codeine (TYLENOL #3) 300-30 MG per tablet, Take 1 tablet by mouth Every 6 (Six) Hours As Needed for Moderate Pain ., Disp: 90 tablet, Rfl: 0  •  azelastine (ASTEPRO) 0.15 % solution nasal spray, SPRAY 2 SPRAYS IN EACH NOSTRIL TWICE DAILY, Disp: 30 mL, Rfl: 0  •  buPROPion XL (WELLBUTRIN XL) 300 MG 24 hr tablet, TAKE 1  "TABLET BY MOUTH EVERY DAY, Disp: 30 tablet, Rfl: 4  •  CALCIUM PO, Take  by mouth., Disp: , Rfl:   •  Diclofenac Sodium (VOLTAREN) 1 % gel gel, , Disp: , Rfl:   •  famotidine (PEPCID) 20 MG tablet, TAKE 1 TABLET BY MOUTH TWO TIMES A DAY., Disp: 60 tablet, Rfl: 5  •  fluticasone (FLONASE) 50 MCG/ACT nasal spray, INSTILL 2 SPRAYS INTO THE NOSTRILS AS DIRECTED BY PROVIDER DAILY., Disp: 16 g, Rfl: 4  •  gabapentin (NEURONTIN) 800 MG tablet, Take 1 tablet by mouth 3 (Three) Times a Day., Disp: 90 tablet, Rfl: 2  •  HYDROcodone-acetaminophen (NORCO) 5-325 MG per tablet, , Disp: , Rfl:   •  lansoprazole (PREVACID) 30 MG capsule, TAKE 1 CAPSULE BY MOUTH DAILY, Disp: 30 capsule, Rfl: 4  •  methocarbamol (ROBAXIN) 750 MG tablet, Take 1 tablet by mouth 3 (Three) Times a Day As Needed for Muscle Spasms., Disp: 90 tablet, Rfl: 5  •  rosuvastatin (CRESTOR) 20 MG tablet, TAKE 1 TABLET BY MOUTH EVERY NIGHT., Disp: 90 tablet, Rfl: 2  •  sucralfate (Carafate) 1 g tablet, Take 1 tablet by mouth 4 (Four) Times a Day., Disp: 120 tablet, Rfl: 5  •  temazepam (RESTORIL) 30 MG capsule, Take 1 capsule by mouth At Night As Needed for Sleep., Disp: 30 capsule, Rfl: 0  •  traMADol (Ultram) 50 MG tablet, , Disp: , Rfl:     OBJECTIVE:  /74 (BP Location: Left arm, Patient Position: Sitting, Cuff Size: Adult)   Pulse 75   Temp 98.6 °F (37 °C) (Temporal)   Ht 162.6 cm (64\")   Wt 77.2 kg (170 lb 3.2 oz)   LMP  (LMP Unknown)   SpO2 99%   Breastfeeding No   BMI 29.21 kg/m²    Physical Exam  Vitals reviewed.   Constitutional:       Appearance: She is well-developed.   Cardiovascular:      Rate and Rhythm: Normal rate and regular rhythm.      Heart sounds: Normal heart sounds.   Pulmonary:      Effort: Pulmonary effort is normal.      Breath sounds: Normal breath sounds.   Neurological:      Mental Status: She is alert and oriented to person, place, and time.   Psychiatric:         Behavior: Behavior normal. " "        Assessment/Plan    Diagnoses and all orders for this visit:    1. Therapeutic drug monitoring (Primary)  -     ToxASSURE Select 13 (MW) - Urine, Clean Catch    2. Primary insomnia  -     temazepam (RESTORIL) 30 MG capsule; Take 1 capsule by mouth At Night As Needed for Sleep.  Dispense: 30 capsule; Refill: 0    Shine reviewed and appropriate. Contract and UDS today.        \"Yumiko\" [id number] used for visit.     An After Visit Summary was printed and given to the patient at discharge.  Return in about 3 months (around 5/11/2022) for Next scheduled follow up - 6 month check up and controlled medication monitoring.       Karyn Claros, YARITZA  02/11/22    Electronically signed.  "

## 2022-02-14 DIAGNOSIS — M51.37 DEGENERATION OF LUMBOSACRAL INTERVERTEBRAL DISC: ICD-10-CM

## 2022-02-14 RX ORDER — CALCIUM CARBONATE/VITAMIN D3 600 MG-10
TABLET ORAL
Qty: 180 TABLET | Refills: 0 | OUTPATIENT
Start: 2022-02-14

## 2022-02-17 LAB — DRUGS UR: NORMAL

## 2022-03-08 RX ORDER — CALCIUM CARBONATE/VITAMIN D3 600 MG-10
TABLET ORAL
Qty: 180 TABLET | Refills: 2 | Status: SHIPPED | OUTPATIENT
Start: 2022-03-08 | End: 2023-03-14

## 2022-03-08 NOTE — TELEPHONE ENCOUNTER
Caller: Hanna Luciano    Relationship: Self    Best call back number: [phone]    Requested Prescriptions:   Requested Prescriptions     Pending Prescriptions Disp Refills   • Calcium + Vitamin D3 600-10 MG-MCG tablet [Pharmacy Med Name: CALCIUM/D3 600-10] 180 tablet 0     Sig: TAKE 1 TABLET BY MOUTH 2 TIMES A DAY.        Pharmacy where request should be sent: [facility] [address] [phone] [facility] [phone] FX     Additional details provided by patient:   Patient will be out of medication this week.    Does the patient have less than a 3 day supply:  [] Yes  [] No    Lisbeth Tolbert, PCT   03/08/22 10:15 CST

## 2022-03-08 NOTE — TELEPHONE ENCOUNTER
Rx Refill Note  Requested Prescriptions     Pending Prescriptions Disp Refills   • Calcium + Vitamin D3 600-10 MG-MCG tablet [Pharmacy Med Name: CALCIUM/D3 600-10] 180 tablet 0     Sig: TAKE 1 TABLET BY MOUTH 2 TIMES A DAY.      Last office visit with prescribing clinician: 02/11/2022  Next office visit with prescribing clinician: 05/10/2022    Leila Mcclure MA  03/08/22, 10:18 CST

## 2022-03-11 DIAGNOSIS — F51.01 PRIMARY INSOMNIA: ICD-10-CM

## 2022-03-11 RX ORDER — TEMAZEPAM 30 MG/1
30 CAPSULE ORAL NIGHTLY PRN
Qty: 30 CAPSULE | Refills: 0 | Status: SHIPPED | OUTPATIENT
Start: 2022-03-11 | End: 2022-04-11 | Stop reason: SDUPTHER

## 2022-03-11 RX ORDER — TEMAZEPAM 30 MG/1
30 CAPSULE ORAL NIGHTLY PRN
Qty: 30 CAPSULE | Refills: 0 | Status: CANCELLED | OUTPATIENT
Start: 2022-03-11

## 2022-03-11 NOTE — TELEPHONE ENCOUNTER
Caller: Hanna Luciano    Relationship: Self    Best call back number: [phone]    Requested Prescriptions:   Requested Prescriptions     Pending Prescriptions Disp Refills   • temazepam (RESTORIL) 30 MG capsule 30 capsule 0     Sig: Take 1 capsule by mouth At Night As Needed for Sleep.      Pharmacy where request should be sent: [facility] [address] [phone] [facility] [phone] FX     Additional details provided by patient: PT HAS BEEN HAVING TO CALL FOR A REFILL EVERY MONTH, WANTS TO KNOW IF A 90 DAY SUPPLY CAN BE PRESCRIBED OR IF SHE CAN AT LEAST GET MORE REFILLS PUT ON THIS MEDICATION    Does the patient have less than a 3 day supply:  [x] Yes  [] No    Aguilar Ramos Rep   03/11/22 11:12 CST

## 2022-03-11 NOTE — TELEPHONE ENCOUNTER
Drug therapy appt 2/11/22  Contract, UDS 2/11/22    Rx Refill Note  Requested Prescriptions     Pending Prescriptions Disp Refills   • temazepam (RESTORIL) 30 MG capsule [Pharmacy Med Name: TEMAZEPAM 30MG] 30 capsule 0     Sig: TAKE 1 CAPSULE BY MOUTH AT NIGHT AS NEEDED FOR SLEEP.      Last office visit with prescribing clinician: 2/11/2022      Next office visit with prescribing clinician: 5/10/2022            Juju Walsh MA  03/11/22, 12:12 CST

## 2022-03-25 NOTE — TELEPHONE ENCOUNTER
Chief complaint:   Chief Complaint   Patient presents with   • Office Visit       Vitals:  Visit Vitals  /77 (BP Location: RUE - Right upper extremity, Patient Position: Sitting, Cuff Size: Large Adult)   Pulse 94   Temp 98.6 °F (37 °C) (Oral)   Resp 16   Ht 5' 9\" (1.753 m) Comment: per pt   Wt 85.7 kg (189 lb) Comment: weighed   SpO2 99%   BMI 27.91 kg/m²       HISTORY OF PRESENT ILLNESS     Patient is a 40-year-old female who comes in for same-day visit for cough and congestion.  Recall that patient has a relatively recent diagnosis of dental infection.  She had been on clindamycin, however, lost the clindamycin script part way through.  She was recently ordered clindamycin yesterday and did restart it.  Patient comes in today because she has had some congestion for 2-3 days associated with sinus pressure, postnasal drip, cough productive of clear sputum.  No known cold exposure.  No fevers.  She thought it was perhaps her allergist and she took Zyrtec.  She has not been using any Flonase nasal sprays or any other nasal sprays.      Other significant problems:  Patient Active Problem List    Diagnosis Date Noted   • Diarrhea in adult patient 07/08/2021     Priority: Low     After eating starts to feel nausea and has sharp pains. Pains are on the right side. Happens 1 hour after eating. This lasts one hour. Then she has diarrhea. Diarrhea looks watery. Does not feel better after the diarrhea - feels generally ill. Pizza triggers it. She did get sick after [employer] as well. She feels like she needs to vomit but doesn't.     • RUQ pain 07/08/2021     Priority: Low     Patient has 2 months of waxing and waning right upper quadrant pain.  She states that she really feels like this is related to a gallbladder issue.  She has pain when she eats and then it goes away after about an hour.  She has some nausea with this.  Does not vomit.  She also has occasional diarrhea.  She did have unremarkable abdominal  Patient requesting to change providers.  States it appears that there is a conflict of interest.  Patient states one of her medications that she has been prescribed is not safe for her to be without.  Patient states it is gabapentin and she contacted Dr. Beth's office for refill. Patient is upset and felt like Karyn should have communicated with Dr. Beth's office.  Has also contacted Dr. Beth's office for temazepam and methocarbamol refill.        Patient was advised Dr. Olguin's response.  Patient states she does not feel that she is listened to by Karyn.  She states she has difficulty with the mask as she reads lips to help her understand.        Patient was offered in person  versus ipad.  Patient is requesting in person .  States she feels that would help her be able to understand better.      Patient states she has stopped taking gummies that contained THC.   ultrasound.     • Therapeutic drug monitoring 05/17/2021     Priority: Low   • Bilateral ovarian cysts 05/04/2021     Priority: Low     Patient has history of ovarian cysts.  Recently advised to establish with an OB because of an enlarging cyst.  She did miss that appointment.     • RLQ abdominal pain 05/04/2021     Priority: Low     Patient's primary complaint is that for the last week or so, she has had right lower quadrant abdominal pain.  Is worse after eating and takes hours to resolve.  It actually started in the right upper quadrant, went to the periumbilical region, then radiates to the right lower quadrant.  Again, has been present for 1 week.  She has chronic nausea associated with this.  Has not vomited.  She has no fever.  She was evaluated in urgent care who sent her to the emergency room.  In the emergency room, patient had a CT scan of the abdomen and pelvis on April 30th that was unrevealing for any suspected appendicitis.  It did comment on ovarian cyst with the right worse than left.  She does have a known history of ovarian cyst.  She has had difficulty with eating due to the nausea.     • Black stools 05/04/2021     Priority: Low     Patient endorses that she had diarrhea for a while, however, she then developed more formed stools that were black in nature.  She denies any recent use to Pepto-Bismol.  Denies any bright red blood per rectum.  She states that she has had about 2 episodes of formed, black stools.     • Hematuria 05/04/2021     Priority: Low     Patient has had prior negative workup from Urology.  She does have some hematuria and ER.  Denies any dysuria.     • Anticoagulated 05/21/2020     Priority: Low     INR on 7/8/21 was 1.4. Thinks she may have missed a dose     • Elevated hemoglobin (CMS/HCC) 04/02/2018     Priority: Low     Likely 2/2 smoking but referring to hematology     • Chronic fatigue 03/30/2018     Priority: Low   • Inflammatory papule 06/14/2017     Priority: Low   •  Spongiotic dermatitis 2017     Priority: Low     17 Left periareolar dermal punch biopsy     • Pain management contract agreement 2016     Priority: Low     Under contract with Dr. Raman for [facility]     • Palpitations 2016     Priority: Low   • Microhematuria 2015     Priority: Low   • Pharyngoesophageal dysphagia 2015     Priority: Low   • Chronic midline low back pain with left-sided sciatica 2015     Priority: Low     Patient has a past medical history of chronic low back pain.  She again has a moderate worsening of her pain on the left.  She states that a normal amount of pain her to be in is 5/10.  Currently she is in 8/10.  She states that last fall, the pain got as high as 10/10.  Pain is in the low back and then travels down the left leg.  She does state that the hydrocodone she takes helps her to sleep through the night.  She denies any injury.  She does drives for a living.  She is trying to avoid her pain flare getting bad like it was last year.  She previously has not really responded to prednisone, muscle relaxers.  She cannot take anti-inflammatories because of her Coumadin.    Dec 2021: takes one at night on good days. On bad days takes two at night  Lidocaine patches do help but wasn't sure if they were    Pain still goes down left leg  No numbness/tingling. No loss of bowel or bladder continence.     • Acute exacerbation of chronic low back pain 2015     Priority: Low     Patient has history of chronic low back pain.  She takes hydrocodone.  Typically, she takes hydrocodone 1 pill at night, however, she will take 2 at night if she is feeling worse.  She states that she is currently feeling like there is a pull or strain in her back that is going down her left leg.  This is typical of her prior back pain flares.  She has had previously relief with taking Medrol Dosepak.  She has no loss of bowel or bladder continence.  There is no numbness.      • Heterozygous factor V Leiden mutation (CMS/[facility])      Priority: Low   • History of pulmonary embolism 01/19/2015     Priority: Low   • Long term (current) use of anticoagulants 12/22/2014     Priority: Low     Patient is on anticoagulation for her history of pulmonary embolism, smoking, heterozygous factor 5     • Tobacco use disorder 10/14/2014     Priority: Low   • Endometriosis, site unspecified 05/02/2011     Priority: Low       PAST MEDICAL, FAMILY AND SOCIAL HISTORY     Medications:  Current Outpatient Medications   Medication   • clindamycin (Cleocin) 300 MG capsule   • HYDROcodone-acetaminophen (NORCO) 7.5-325 MG per tablet   • albuterol 108 (90 Base) MCG/ACT inhaler   • mometasone-formoterol (Dulera) 100-5 MCG/ACT inhaler   • warfarin (COUMADIN) 5 MG tablet   • cetirizine (ZyrTEC) 10 MG tablet   • lidocaine (LIDODERM) 5 % patch   • folic acid (FOLATE) 1 MG tablet   • nicotine (NICODERM) 14 MG/24HR patch   • azelastine (ASTELIN) 0.1 % nasal spray   • albuterol-ipratropium 2.5 mg/0.5 mg (DUONEB) 0.5-2.5 (3) MG/3ML nebulizer solution   • fluticasone (FLONASE) 50 MCG/ACT nasal spray   • montelukast (Singulair) 10 MG tablet     No current facility-administered medications for this visit.       Allergies:  ALLERGIES:   Allergen Reactions   • Penicillins SHORTNESS OF BREATH       Past Medical  History/Surgeries:  Past Medical History:   Diagnosis Date   • Abnormal mammogram 9/2016    Discussed breast imaging - possible sebaceous or benign cysts in breast, small calcifications. repeat in 6 months   • Abnormal PAP Smear     ASCUS and HPV positive   • Abnormal uterine bleeding     after starting warfarin, ultrasound negative for cause, started depo   • Allergy    • Allergy     seasonal   • Asthma     No recent hospitalizations.   • Back pain    • Blood clot associated with vein wall inflammation    • Bronchitis    • Chronic pain    • Dislocation of left patella 5/2012    and at age 15   • Dysphagia      regurgitation of solids. Sees ENT.   • Endometriosis    • Hand pain, left-Work Related Injury 6/20/17 6/22/2017   • Heterozygous factor V Leiden mutation (CMS/HCC)    • HLD (hyperlipidemia)    • Pneumonia    • Pulmonary embolism (CMS/HCC) 12/17/2014    small, bilateral   • Spongiotic dermatitis    • Tobacco abuse        Past Surgical History:   Procedure Laterality Date   • Cardiac stress test complete  8/2013    normal NM scan   • Echo heart resting  9/2015    normal. LVEF 76%. RVSP 27mmHg   • Endometrial ablation     • Esophagogastroduodenoscopy  9/2013    normal   • Pap smear  9/2015    WNL and HPV neg   • Shoulder surg proc unlisted      Unspecified shoulder procedure   • Skin biopsy  2017    not Paget's   • Tonsillectomy and adenoidectomy     • Tympanostomy tube placement         Family History:  Family History   Problem Relation Age of Onset   • Cancer, Ovarian Maternal Grandmother    • Hypertension Maternal Grandmother    • Stroke Maternal Grandfather    • Hypertension Maternal Grandfather    • Sleep Apnea Mother    • * Mother         restless leg   • Allergic Rhinitis Sister    • * Sister         abnormal pap   • Cancer, Breast Paternal Aunt         questionable   • Cancer, Breast Paternal Aunt         questionable       Social History:  Social History     Tobacco Use   • Smoking status: Current Every Day Smoker     Packs/day: 0.50     Years: 10.00     Pack years: 5.00     Types: Cigarettes     Start date: 8/10/2020   • Smokeless tobacco: Never Used   Substance Use Topics   • Alcohol use: No     Alcohol/week: 0.0 standard drinks       REVIEW OF SYSTEMS     Review of Systems  As per HPI and MA note  PHYSICAL EXAM     Physical Exam  Vitals and nursing note reviewed.   Constitutional:       General: She is not in acute distress.     Appearance: She is well-developed. She is not diaphoretic.   HENT:      Head: Normocephalic and atraumatic.      Right Ear: Tympanic membrane and ear canal normal.      Left Ear:  Tympanic membrane and ear canal normal.      Nose: Congestion and rhinorrhea present.      Mouth/Throat:      Mouth: Mucous membranes are moist.      Neck: Normal range of motion.   Eyes:      General: No scleral icterus.        Right eye: No discharge.         Left eye: No discharge.      Conjunctiva/sclera: Conjunctivae normal.   Cardiovascular:      Rate and Rhythm: Normal rate.   Pulmonary:      Effort: Pulmonary effort is normal. No respiratory distress.   Musculoskeletal:         General: No deformity. Normal range of motion.   Lymphadenopathy:      Cervical: No cervical adenopathy.   Skin:     General: Skin is warm and dry.      Coloration: Skin is not pale.      Findings: No erythema or rash.   Neurological:      Mental Status: She is alert and oriented to person, place, and time.      Gait: Gait normal.   Psychiatric:         Behavior: Behavior normal.         Thought Content: Thought content normal.         Judgment: Judgment normal.         ASSESSMENT/PLAN     Elo was seen today for office visit.    Diagnoses and all orders for this visit:    Anticoagulated  -     POCT PT/INR    Upper respiratory tract infection, unspecified type  -     RAPID SARS-COV-2 BY PCR    Chronic bronchitis, unspecified chronic bronchitis type (CMS/HCC)  -     fluticasone (FLONASE) 50 MCG/ACT nasal spray; Spray 1 spray in each nostril 2 times daily.    Seasonal allergies  -     montelukast (Singulair) 10 MG tablet; Take 1 tablet by mouth nightly.    Patient seen today for same-day visit for upper respiratory symptoms.  At this time, she has 3 days of congestion.  She is already on clindamycin for presumed dental infection.  I do not see indication to escalate antibiotics.  I believe the cause of her sinus congestion is more likely to be due to allergies or viral etiology. COVID swab was negative.  At this time, I would like her to restart Flonase nasal spray and also Singulair that she was not actually taking.  Continue  supportive care.

## 2022-04-01 RX ORDER — AZELASTINE HCL 205.5 UG/1
SPRAY NASAL
Qty: 30 ML | Refills: 0 | Status: SHIPPED | OUTPATIENT
Start: 2022-04-01 | End: 2022-06-09 | Stop reason: SDUPTHER

## 2022-04-06 ENCOUNTER — TELEPHONE (OUTPATIENT)
Dept: INTERNAL MEDICINE | Facility: CLINIC | Age: 57
End: 2022-04-06

## 2022-04-08 NOTE — TELEPHONE ENCOUNTER
Patient called back and became very upset.  I called and spoke to my manager about the call and she said she would call and speak with the patient.

## 2022-04-11 DIAGNOSIS — F51.01 PRIMARY INSOMNIA: ICD-10-CM

## 2022-04-11 RX ORDER — TEMAZEPAM 30 MG/1
30 CAPSULE ORAL NIGHTLY PRN
Qty: 30 CAPSULE | Refills: 0 | Status: SHIPPED | OUTPATIENT
Start: 2022-04-11 | End: 2022-05-11 | Stop reason: SDUPTHER

## 2022-04-11 NOTE — TELEPHONE ENCOUNTER
Drug therapy appt 2/11/22  Contract, UDS 2/11/22      Rx Refill Note  Requested Prescriptions     Pending Prescriptions Disp Refills   • temazepam (RESTORIL) 30 MG capsule 30 capsule 0     Sig: Take 1 capsule by mouth At Night As Needed for Sleep.      Last office visit with prescribing clinician: 2/11/2022      Next office visit with prescribing clinician: 5/10/2022          {TIP  Is Refill Pharmacy correct?:23}  Juju Walsh MA  04/11/22, 12:02 CDT

## 2022-04-11 NOTE — TELEPHONE ENCOUNTER
Caller: Hanna Luciano    Relationship: Self    Best call back number: [phone]    Requested Prescriptions:   Requested Prescriptions     Pending Prescriptions Disp Refills   • temazepam (RESTORIL) 30 MG capsule 30 capsule 0     Sig: Take 1 capsule by mouth At Night As Needed for Sleep.        Pharmacy where request should be sent: [facility] [address] [phone] [facility] [phone] FX         Does the patient have less than a 3 day supply:  [x] Yes  [] No    Aguilar Phillips Rep   04/11/22 10:56 CDT

## 2022-04-25 ENCOUNTER — OFFICE VISIT (OUTPATIENT)
Dept: FAMILY MEDICINE CLINIC | Facility: CLINIC | Age: 57
End: 2022-04-25

## 2022-04-25 ENCOUNTER — TELEPHONE (OUTPATIENT)
Dept: INTERNAL MEDICINE | Facility: CLINIC | Age: 57
End: 2022-04-25

## 2022-04-25 VITALS
WEIGHT: 177.4 LBS | SYSTOLIC BLOOD PRESSURE: 110 MMHG | HEIGHT: 64 IN | HEART RATE: 80 BPM | TEMPERATURE: 98.7 F | BODY MASS INDEX: 30.29 KG/M2 | OXYGEN SATURATION: 98 % | DIASTOLIC BLOOD PRESSURE: 76 MMHG

## 2022-04-25 DIAGNOSIS — G25.81 RESTLESS LEG SYNDROME: ICD-10-CM

## 2022-04-25 DIAGNOSIS — E78.2 MIXED HYPERLIPIDEMIA: ICD-10-CM

## 2022-04-25 DIAGNOSIS — E66.3 OVERWEIGHT WITH BODY MASS INDEX (BMI) OF 26 TO 26.9 IN ADULT: ICD-10-CM

## 2022-04-25 DIAGNOSIS — E66.9 CLASS 1 OBESITY WITHOUT SERIOUS COMORBIDITY WITH BODY MASS INDEX (BMI) OF 30.0 TO 30.9 IN ADULT, UNSPECIFIED OBESITY TYPE: ICD-10-CM

## 2022-04-25 DIAGNOSIS — F41.9 ANXIETY: Primary | ICD-10-CM

## 2022-04-25 DIAGNOSIS — F32.A DEPRESSION, UNSPECIFIED DEPRESSION TYPE: ICD-10-CM

## 2022-04-25 PROCEDURE — 99214 OFFICE O/P EST MOD 30 MIN: CPT | Performed by: NURSE PRACTITIONER

## 2022-04-25 RX ORDER — METHYLPREDNISOLONE 4 MG/1
TABLET ORAL
COMMUNITY
Start: 2022-04-12 | End: 2022-11-10

## 2022-04-25 NOTE — PROGRESS NOTES
CC: controlled medication monitoring - RLS    History:  Hanna Luciano is a 56 y.o. female who presents today for evaluation of the above problems.      Patient states that her Pain Mgmt. Team had changed her from tylenol 3 to hydrocodone, however, she believes that it is causing her to be unable to sleep. Believes that tylenol 3 works better for her and doesn't interfere with her sleep. She stopped taking the hydrocodone and went back to the tylenol and will discuss with pain mgmt at her next appt. States that her sleep has improved since doing this.   Continues to take temazepam for RLS and this does work well for her.     CONTROLLED SUBSTANCE TRACKING 1/25/2021 5/10/2021 8/13/2021 11/12/2021 2/11/2022 4/25/2022   Last Shine 1/25/2021 5/10/2021 8/13/2021 11/12/2021 2/11/2022 4/25/2022   Report Number Karyn reviewed through EPIC reviewed by Karyn Claros - reviewed through EPIC reviewed through  epic reviewed through [facility]   Last UDS 1/25/2021 1/25/2021 1/25/2021 1/25/2021 2/11/2022 2/11/2022   Last Controlled Substance Agreement 1/25/2021 1/25/2021 1/25/2021 1/25/2021 2/11/2022 2/11/2022      Yaquelin [id number] via iPad was used as  for visit.    HPI  ROS:  Review of Systems   Psychiatric/Behavioral: Positive for sleep disturbance.       Allergies   Allergen Reactions   • Sulfamethoxazole-Trimethoprim Anaphylaxis   • Atorvastatin Unknown (See Comments)     Pt unsure of allergy   • Baclofen GI Intolerance   • Diphenhydramine Other (See Comments)     Pt unsure of allergy   • Lorazepam Other (See Comments)     Pt unsure of allergy   • Morphine GI Intolerance   • Penicillins Unknown (See Comments)     Pt unsure of allergy   • Soma [Carisoprodol] Unknown (See Comments)     Pt unsure of allergy   • Valproic Acid Unknown (See Comments)     Pt unsure of allergy     Past Medical History:   Diagnosis Date   • ADHD (attention deficit hyperactivity disorder) 2003    From [facility] in [place]   • Allergic  Since 2001   • Arthritis    • Colon polyp 2014 & 2021    Removed 4th polp   • Depression    • Family history of colon cancer    • Generalized headaches    • GERD (gastroesophageal reflux disease)    • History of colon polyps    • HL (hearing loss) Since Birth    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   • Hyperlipidemia    • Low back pain 2003    Major Back surgery   • Osteopenia    • Tuberculosis 1998    Not active     Past Surgical History:   Procedure Laterality Date   • APPENDECTOMY     • BACK SURGERY  2003   • CHOLECYSTECTOMY     • COLONOSCOPY N/A 4/28/2021    Procedure: COLONOSCOPY WITH ANESTHESIA;  Surgeon: Sandra Nguyen MD;  Location: [facility];  Service: Gastroenterology;  Laterality: N/A;  preop; epogastric pain  postop; polyps   PCP Andres Martinez    • ENDOSCOPY N/A 4/28/2021    Procedure: ESOPHAGOGASTRODUODENOSCOPY WITH ANESTHESIA;  Surgeon: Sandra Nguyen MD;  Location: [facility] END[facility];  Service: Gastroenterology;  Laterality: N/A;  preop; dysphagia  postop; normal  PCP Kirti Martinez    • EYE SURGERY Left 1965    left eyebrow   • OOPHORECTOMY Left 2000   • SPINE SURGERY       Family History   Problem Relation Age of Onset   • Hyperlipidemia Other    • Heart disease Other    • Hypertension Other    • Diabetes Other    • Cancer Other    • Arthritis Other    • Mental illness Other    • Colon cancer Mother 77   • Alcohol abuse Mother    • No Known Problems Father    • Colon polyps Neg Hx    • Esophageal cancer Neg Hx    • Liver cancer Neg Hx    • Liver disease Neg Hx    • Rectal cancer Neg Hx    • Stomach cancer Neg Hx       reports that she quit smoking about 22 years ago. Her smoking use included cigarettes and electronic cigarette. She has a 0.75 pack-year smoking history. She has never used smokeless tobacco. She reports that she does not drink alcohol and does not use drugs.      Current Outpatient Medications:   •  acetaminophen (TYLENOL) 500 MG tablet, Take 500 mg by mouth As Needed for Mild Pain  "., Disp: , Rfl:   •  acetaminophen-codeine (TYLENOL #3) 300-30 MG per tablet, Take 1 tablet by mouth Every 6 (Six) Hours As Needed for Moderate Pain ., Disp: 90 tablet, Rfl: 0  •  azelastine (ASTEPRO) 0.15 % solution nasal spray, SPRAY 2 SPRAYS IN EACH NOSTRIL TWICE DAILY, Disp: 30 mL, Rfl: 0  •  buPROPion XL (WELLBUTRIN XL) 300 MG 24 hr tablet, TAKE 1 TABLET BY MOUTH EVERY DAY, Disp: 30 tablet, Rfl: 4  •  Calcium + Vitamin D3 600-10 MG-MCG tablet, TAKE 1 TABLET BY MOUTH 2 TIMES A DAY., Disp: 180 tablet, Rfl: 2  •  CALCIUM PO, Take  by mouth., Disp: , Rfl:   •  Diclofenac Sodium (VOLTAREN) 1 % gel gel, , Disp: , Rfl:   •  famotidine (PEPCID) 20 MG tablet, TAKE 1 TABLET BY MOUTH TWO TIMES A DAY., Disp: 60 tablet, Rfl: 5  •  fluticasone (FLONASE) 50 MCG/ACT nasal spray, INSTILL 2 SPRAYS INTO THE NOSTRILS AS DIRECTED BY PROVIDER DAILY., Disp: 16 g, Rfl: 4  •  gabapentin (NEURONTIN) 800 MG tablet, Take 1 tablet by mouth 3 (Three) Times a Day., Disp: 90 tablet, Rfl: 2  •  lansoprazole (PREVACID) 30 MG capsule, TAKE 1 CAPSULE BY MOUTH DAILY, Disp: 30 capsule, Rfl: 4  •  methocarbamol (ROBAXIN) 750 MG tablet, Take 1 tablet by mouth 3 (Three) Times a Day As Needed for Muscle Spasms., Disp: 90 tablet, Rfl: 5  •  rosuvastatin (CRESTOR) 20 MG tablet, TAKE 1 TABLET BY MOUTH EVERY NIGHT., Disp: 90 tablet, Rfl: 2  •  sucralfate (Carafate) 1 g tablet, Take 1 tablet by mouth 4 (Four) Times a Day., Disp: 120 tablet, Rfl: 5  •  temazepam (RESTORIL) 30 MG capsule, Take 1 capsule by mouth At Night As Needed for Sleep., Disp: 30 capsule, Rfl: 0  •  traMADol (ULTRAM) 50 MG tablet, , Disp: , Rfl:   •  methylPREDNISolone (MEDROL) 4 MG dose pack, , Disp: , Rfl:     OBJECTIVE:  /76 (BP Location: Right arm, Patient Position: Sitting, Cuff Size: Adult)   Pulse 80   Temp 98.7 °F (37.1 °C) (Temporal)   Ht 162.6 cm (64\")   Wt 80.5 kg (177 lb 6.4 oz)   LMP  (LMP Unknown)   SpO2 98%   Breastfeeding No   BMI 30.45 kg/m²    Physical " Exam  Vitals reviewed.   Constitutional:       Appearance: She is well-developed.   Cardiovascular:      Rate and Rhythm: Normal rate and regular rhythm.      Heart sounds: Normal heart sounds.   Pulmonary:      Effort: Pulmonary effort is normal.      Breath sounds: Normal breath sounds.   Neurological:      Mental Status: She is alert and oriented to person, place, and time.   Psychiatric:         Behavior: Behavior normal.         Assessment/Plan    Diagnoses and all orders for this visit:    1. Anxiety (Primary)  -     Comprehensive Metabolic Panel  -     CBC & Differential    2. Depression, unspecified depression type  -     Comprehensive Metabolic Panel  -     CBC & Differential    3. Mixed hyperlipidemia  -     Lipid Panel    4. Overweight with body mass index (BMI) of 26 to 26.9 in adult  -     Comprehensive Metabolic Panel  -     CBC & Differential    5. Class 1 obesity without serious comorbidity with body mass index (BMI) of 30.0 to 30.9 in adult, unspecified obesity type  -     Comprehensive Metabolic Panel  -     CBC & Differential    6. Restless leg syndrome  -     Comprehensive Metabolic Panel  -     CBC & Differential        An After Visit Summary was printed and given to the patient at discharge.  Return in about 3 months (around 7/25/2022) for Next scheduled follow up.       YARITZA Smith 4/25/22    Electronically signed.

## 2022-04-25 NOTE — TELEPHONE ENCOUNTER
Caller: Luciano Hanna KATI    Relationship: Self    Best call back number: [phone] (H)  What is the best time to reach you: ANYTIME IF NEEDED     Who are you requesting to speak with (clinical staff, provider,  specific staff member): CLINICAL     Do you know the name of the person who called: FRIENDLY REMINDER   NO ONE CALLED     What was the call regarding: SHE STATES SHE WANTED TO GIVE A FRIENDLY REMINDER TO THE CLINICAL TEAM TO HAVE THE IPAD CHARGED SHE WILL BE IN TODAY 04/25/22 AT 9:30    Do you require a callback: IF NEEDED

## 2022-04-26 LAB
ALBUMIN SERPL-MCNC: 4.2 G/DL (ref 3.5–5.2)
ALBUMIN/GLOB SERPL: 2 G/DL
ALP SERPL-CCNC: 103 U/L (ref 39–117)
ALT SERPL-CCNC: 19 U/L (ref 1–33)
AST SERPL-CCNC: 21 U/L (ref 1–32)
BASOPHILS # BLD AUTO: 0.05 10*3/MM3 (ref 0–0.2)
BASOPHILS NFR BLD AUTO: 0.9 % (ref 0–1.5)
BILIRUB SERPL-MCNC: 0.3 MG/DL (ref 0–1.2)
BUN SERPL-MCNC: 13 MG/DL (ref 6–20)
BUN/CREAT SERPL: 11.5 (ref 7–25)
CALCIUM SERPL-MCNC: 9.5 MG/DL (ref 8.6–10.5)
CHLORIDE SERPL-SCNC: 100 MMOL/L (ref 98–107)
CHOLEST SERPL-MCNC: 159 MG/DL (ref 0–200)
CO2 SERPL-SCNC: 29.9 MMOL/L (ref 22–29)
CREAT SERPL-MCNC: 1.13 MG/DL (ref 0.57–1)
EGFRCR SERPLBLD CKD-EPI 2021: 56.9 ML/MIN/1.73
EOSINOPHIL # BLD AUTO: 0.13 10*3/MM3 (ref 0–0.4)
EOSINOPHIL NFR BLD AUTO: 2.3 % (ref 0.3–6.2)
ERYTHROCYTE [DISTWIDTH] IN BLOOD BY AUTOMATED COUNT: 12.4 % (ref 12.3–15.4)
GLOBULIN SER CALC-MCNC: 2.1 GM/DL
GLUCOSE SERPL-MCNC: 94 MG/DL (ref 65–99)
HCT VFR BLD AUTO: 41.5 % (ref 34–46.6)
HDLC SERPL-MCNC: 74 MG/DL (ref 40–60)
HGB BLD-MCNC: 13.3 G/DL (ref 12–15.9)
IMM GRANULOCYTES # BLD AUTO: 0.03 10*3/MM3 (ref 0–0.05)
IMM GRANULOCYTES NFR BLD AUTO: 0.5 % (ref 0–0.5)
LDLC SERPL CALC-MCNC: 62 MG/DL (ref 0–100)
LYMPHOCYTES # BLD AUTO: 1.23 10*3/MM3 (ref 0.7–3.1)
LYMPHOCYTES NFR BLD AUTO: 21.4 % (ref 19.6–45.3)
MCH RBC QN AUTO: 31.2 PG (ref 26.6–33)
MCHC RBC AUTO-ENTMCNC: 32 G/DL (ref 31.5–35.7)
MCV RBC AUTO: 97.4 FL (ref 79–97)
MONOCYTES # BLD AUTO: 0.52 10*3/MM3 (ref 0.1–0.9)
MONOCYTES NFR BLD AUTO: 9 % (ref 5–12)
NEUTROPHILS # BLD AUTO: 3.79 10*3/MM3 (ref 1.7–7)
NEUTROPHILS NFR BLD AUTO: 65.9 % (ref 42.7–76)
NRBC BLD AUTO-RTO: 0 /100 WBC (ref 0–0.2)
PLATELET # BLD AUTO: 230 10*3/MM3 (ref 140–450)
POTASSIUM SERPL-SCNC: 4.2 MMOL/L (ref 3.5–5.2)
PROT SERPL-MCNC: 6.3 G/DL (ref 6–8.5)
RBC # BLD AUTO: 4.26 10*6/MM3 (ref 3.77–5.28)
SODIUM SERPL-SCNC: 141 MMOL/L (ref 136–145)
TRIGL SERPL-MCNC: 136 MG/DL (ref 0–150)
VLDLC SERPL CALC-MCNC: 23 MG/DL (ref 5–40)
WBC # BLD AUTO: 5.75 10*3/MM3 (ref 3.4–10.8)

## 2022-05-05 RX ORDER — FAMOTIDINE 20 MG/1
TABLET, FILM COATED ORAL
Qty: 60 TABLET | Refills: 4 | Status: SHIPPED | OUTPATIENT
Start: 2022-05-05 | End: 2022-10-06 | Stop reason: SDUPTHER

## 2022-05-05 NOTE — TELEPHONE ENCOUNTER
Rx Refill Note  Requested Prescriptions     Pending Prescriptions Disp Refills   • famotidine (PEPCID) 20 MG tablet [Pharmacy Med Name: FAMOTIDINE 20MG] 60 tablet 4     Sig: TAKE 1 TABLET BY MOUTH TWO TIMES A DAY.      Last office visit with prescribing clinician: 4/25/2022      Next office visit with prescribing clinician: 7/25/2022            Juju Walsh MA  05/05/22, 09:32 CDT

## 2022-05-11 DIAGNOSIS — K21.9 GASTROESOPHAGEAL REFLUX DISEASE: ICD-10-CM

## 2022-05-11 DIAGNOSIS — F51.01 PRIMARY INSOMNIA: ICD-10-CM

## 2022-05-11 NOTE — TELEPHONE ENCOUNTER
Rx Refill Note  Requested Prescriptions     Pending Prescriptions Disp Refills   • temazepam (RESTORIL) 30 MG capsule 30 capsule 0     Sig: Take 1 capsule by mouth At Night As Needed for Sleep.     Refused Prescriptions Disp Refills   • lansoprazole (PREVACID) 30 MG capsule 30 capsule 4     Sig: Take 1 capsule by mouth Daily.      Last office visit with prescribing clinician: 4/25/2022      Next office visit with prescribing clinician: 7/25/2022     Drug therapy appt 4/25/22  Contract, UDS 2/11/22  Patient requested to put off her Mammogram till 2022.     {TIP  Please add Last Relevant Lab Date if appropriate: 04/25/2021    {TIP  Is Refill Pharmacy correct?: yes    Juju Sawyer MA  05/11/22, 13:00 CDT

## 2022-05-11 NOTE — TELEPHONE ENCOUNTER
Caller: Hanna Luciano    Relationship: Self    Best call back number: [phone]     Requested Prescriptions:   Requested Prescriptions     Pending Prescriptions Disp Refills   • lansoprazole (PREVACID) 30 MG capsule 30 capsule 4     Sig: Take 1 capsule by mouth Daily.   • temazepam (RESTORIL) 30 MG capsule 30 capsule 0     Sig: Take 1 capsule by mouth At Night As Needed for Sleep.        Pharmacy where request should be sent: [facility] [address] [phone] [facility] [phone] FX     Additional details provided by patient: ONE DAY LEFT, WOULD LIKE TO  TOMORROW.    Does the patient have less than a 3 day supply:  [x] Yes  [] No    Aguilar Artis   05/11/22 12:35 CDT

## 2022-05-13 RX ORDER — LANSOPRAZOLE 30 MG/1
30 CAPSULE, DELAYED RELEASE ORAL DAILY
Qty: 30 CAPSULE | Refills: 4 | Status: SHIPPED | OUTPATIENT
Start: 2022-05-13 | End: 2022-10-12

## 2022-05-13 RX ORDER — TEMAZEPAM 30 MG/1
30 CAPSULE ORAL NIGHTLY PRN
Qty: 30 CAPSULE | Refills: 0 | Status: SHIPPED | OUTPATIENT
Start: 2022-05-13 | End: 2022-06-09 | Stop reason: SDUPTHER

## 2022-05-13 NOTE — TELEPHONE ENCOUNTER
She stated that the cream caused vaginal bleeding when she used it before, so I don't think I want to refill that.

## 2022-06-06 RX ORDER — BUPROPION HYDROCHLORIDE 300 MG/1
TABLET ORAL
Qty: 90 TABLET | Refills: 1 | Status: SHIPPED | OUTPATIENT
Start: 2022-06-06 | End: 2022-12-05 | Stop reason: SDUPTHER

## 2022-06-06 NOTE — TELEPHONE ENCOUNTER
Rx Refill Note  Requested Prescriptions     Pending Prescriptions Disp Refills   • buPROPion XL (WELLBUTRIN XL) 300 MG 24 hr tablet [Pharmacy Med Name: BUPROPN HCL 300MG XL] 30 tablet 3     Sig: TAKE 1 TABLET BY MOUTH ONCE DAILY      Last office visit with prescribing clinician: 4/25/22  Next office visit with prescribing clinician: 7/25/22           Juju Walsh MA  06/06/22, 10:52 CDT

## 2022-06-09 DIAGNOSIS — F51.01 PRIMARY INSOMNIA: ICD-10-CM

## 2022-06-09 RX ORDER — AZELASTINE HCL 205.5 UG/1
1 SPRAY NASAL 2 TIMES DAILY
Qty: 30 ML | Refills: 1 | Status: SHIPPED | OUTPATIENT
Start: 2022-06-09 | End: 2022-08-17

## 2022-06-09 RX ORDER — TEMAZEPAM 30 MG/1
30 CAPSULE ORAL NIGHTLY PRN
Qty: 30 CAPSULE | Refills: 0 | Status: SHIPPED | OUTPATIENT
Start: 2022-06-09 | End: 2022-07-07 | Stop reason: SDUPTHER

## 2022-06-09 NOTE — TELEPHONE ENCOUNTER
Drug therapy appt 4/25/22  Contract, UDS 2/11/22    Rx Refill Note  Requested Prescriptions     Pending Prescriptions Disp Refills   • temazepam (RESTORIL) 30 MG capsule 30 capsule 0     Sig: Take 1 capsule by mouth At Night As Needed for Sleep.   • azelastine (ASTEPRO) 0.15 % solution nasal spray 30 mL 0      Last office visit with prescribing clinician: 4/25/2022      Next office visit with prescribing clinician: 7/25/2022            Juju Walsh MA  06/09/22, 10:58 CDT

## 2022-06-09 NOTE — TELEPHONE ENCOUNTER
Caller: Hanna Luciano    Relationship: Self    Best call back number: [phone]  Requested Prescriptions:   Requested Prescriptions     Pending Prescriptions Disp Refills   • temazepam (RESTORIL) 30 MG capsule 30 capsule 0     Sig: Take 1 capsule by mouth At Night As Needed for Sleep.   • azelastine (ASTEPRO) 0.15 % solution nasal spray 30 mL 0        Pharmacy where request should be sent: [facility] [address] [phone] [facility] [phone] FX         Does the patient have less than a 3 day supply:  [x] Yes  [] No    Aguilar WHELAN Rep   06/09/22 10:48 CDT

## 2022-06-15 ENCOUNTER — HOSPITAL ENCOUNTER (OUTPATIENT)
Dept: BONE DENSITY | Facility: HOSPITAL | Age: 57
Discharge: HOME | End: 2022-06-15

## 2022-06-15 ENCOUNTER — HOSPITAL ENCOUNTER (OUTPATIENT)
Dept: MAMMOGRAPHY | Facility: HOSPITAL | Age: 57
Discharge: HOME | End: 2022-06-15

## 2022-06-15 DIAGNOSIS — Z00.00 MEDICARE ANNUAL WELLNESS VISIT, SUBSEQUENT: ICD-10-CM

## 2022-06-15 DIAGNOSIS — Z78.0 POSTMENOPAUSAL: ICD-10-CM

## 2022-06-15 DIAGNOSIS — Z12.31 ENCOUNTER FOR SCREENING MAMMOGRAM FOR MALIGNANT NEOPLASM OF BREAST: ICD-10-CM

## 2022-06-15 PROBLEM — M81.8 OTHER OSTEOPOROSIS WITHOUT CURRENT PATHOLOGICAL FRACTURE: Status: ACTIVE | Noted: 2022-06-15

## 2022-06-15 PROCEDURE — 77080 DXA BONE DENSITY AXIAL: CPT

## 2022-06-15 PROCEDURE — 77067 SCR MAMMO BI INCL CAD: CPT

## 2022-06-15 PROCEDURE — 77063 BREAST TOMOSYNTHESIS BI: CPT

## 2022-06-15 RX ORDER — ALENDRONATE SODIUM 70 MG/1
70 TABLET ORAL
Qty: 12 TABLET | Refills: 3 | Status: SHIPPED | OUTPATIENT
Start: 2022-06-15 | End: 2023-03-14

## 2022-06-15 NOTE — PROGRESS NOTES
Dexa scan shows osteoporosis. Bones are week. Needs fosamax weekly.  Must stay upright for an hour after taking medication to prevent irritation of the esophagus.

## 2022-07-07 DIAGNOSIS — F51.01 PRIMARY INSOMNIA: ICD-10-CM

## 2022-07-07 RX ORDER — TEMAZEPAM 30 MG/1
30 CAPSULE ORAL NIGHTLY PRN
Qty: 30 CAPSULE | Refills: 0 | Status: SHIPPED | OUTPATIENT
Start: 2022-07-07 | End: 2022-08-08

## 2022-07-07 NOTE — TELEPHONE ENCOUNTER
Rx Refill Note  Requested Prescriptions     Pending Prescriptions Disp Refills   • temazepam (RESTORIL) 30 MG capsule 30 capsule 0     Sig: Take 1 capsule by mouth At Night As Needed for Sleep.      Last office visit with prescribing clinician: 4/25/2022      Next office visit with prescribing clinician: 7/25/2022       Drug therapy appt 4/25/22  Contract, UDS 2/11/22         Juju Sawyer MA  07/07/22, 09:19 CDT

## 2022-07-07 NOTE — TELEPHONE ENCOUNTER
Caller: Hanna Luciano    Relationship: Self    Best call back number: [phone]    Requested Prescriptions:   Requested Prescriptions     Pending Prescriptions Disp Refills   • temazepam (RESTORIL) 30 MG capsule 30 capsule 0     Sig: Take 1 capsule by mouth At Night As Needed for Sleep.        Pharmacy where request should be sent: [facility] [address] [phone] [facility] [phone] FX       Does the patient have less than a 3 day supply:  [x] Yes  [] No    Aguilar Ramos Rep   07/07/22 09:14 CDT

## 2022-07-25 ENCOUNTER — OFFICE VISIT (OUTPATIENT)
Dept: FAMILY MEDICINE CLINIC | Facility: CLINIC | Age: 57
End: 2022-07-25

## 2022-07-25 VITALS
SYSTOLIC BLOOD PRESSURE: 104 MMHG | TEMPERATURE: 99.3 F | HEIGHT: 64 IN | WEIGHT: 184.6 LBS | DIASTOLIC BLOOD PRESSURE: 71 MMHG | BODY MASS INDEX: 31.51 KG/M2 | OXYGEN SATURATION: 96 % | HEART RATE: 82 BPM

## 2022-07-25 DIAGNOSIS — F51.01 PRIMARY INSOMNIA: Primary | ICD-10-CM

## 2022-07-25 PROCEDURE — 99213 OFFICE O/P EST LOW 20 MIN: CPT | Performed by: NURSE PRACTITIONER

## 2022-07-25 NOTE — PROGRESS NOTES
CC: controlled medication monitoring - insomnia    History:  Hanna Luciano is a 56 y.o. female who presents today for evaluation of the above problems.      ASL interpretor Angelique ([id number]) used for this visit.     Patient presents for 3 month monitoring of controlled medication for insomnia. Medication continues to work well. Denies any issues with treatment.     CONTROLLED SUBSTANCE TRACKING 1/25/2021 5/10/2021 8/13/2021 11/12/2021 2/11/2022 4/25/2022 7/25/2022   Last Shine 1/25/2021 5/10/2021 8/13/2021 11/12/2021 2/11/2022 4/25/2022 7/25/2022   Report Number Karyn reviewed through EPIC reviewed by Karyn Claros - reviewed through EPIC reviewed through  epic reviewed through [facility]VIEWED [facility]OUGH [facility]   Last UDS 1/25/2021 1/25/2021 1/25/2021 1/25/2021 2/11/2022 2/11/2022 2/11/2022   Last Controlled Substance Agreement 1/25/2021 1/25/2021 1/25/2021 1/25/2021 2/11/2022 2/11/2022 2/11/2022       HPI  ROS:  Review of Systems   Psychiatric/Behavioral: Negative for sleep disturbance.       Allergies   Allergen Reactions   • Sulfamethoxazole-Trimethoprim Anaphylaxis   • Atorvastatin Unknown (See Comments)     Pt unsure of allergy   • Baclofen GI Intolerance   • Diphenhydramine Other (See Comments)     Pt unsure of allergy   • Lorazepam Other (See Comments)     Pt unsure of allergy   • Morphine GI Intolerance   • Penicillins Unknown (See Comments)     Pt unsure of allergy   • Soma [Carisoprodol] Unknown (See Comments)     Pt unsure of allergy   • Valproic Acid Unknown (See Comments)     Pt unsure of allergy     Past Medical History:   Diagnosis Date   • ADHD (attention deficit hyperactivity disorder) 2003    [facility] [place]   • Allergic Since 2001   • Arthritis    • Colon polyp 2014 & 2021    Removed 4th polp   • Depression    • Family history of colon cancer    • Generalized headaches    • GERD (gastroesophageal reflux disease)    • History of colon polyps    • HL (hearing loss) Since Birth    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;  Service: Gastroenterology;  Laterality: N/A;  preop; epogastric pain  postop; polyps   PCP Andres Martinez    • ENDOSCOPY N/A 04/28/2021    Procedure: ESOPHAGOGASTRODUODENOSCOPY WITH ANESTHESIA;  Surgeon: Sandra Nguyen MD;  Location: [facility];  Service: Gastroenterology;  Laterality: N/A;  preop; dysphagia  postop; normal  PCP Kirti Martinez    • EYE SURGERY Left 1965    left eyebrow   • OOPHORECTOMY Left 2000   • SPINE SURGERY     • TUBAL ABDOMINAL LIGATION  1994    I’m done being pregnant     Family History   Problem Relation Age of Onset   • Colon cancer Mother 77   • Alcohol abuse Mother    • No Known Problems Father    • Hyperlipidemia Other    • Heart disease Other    • Hypertension Other    • Diabetes Other    • Cancer Other    • Arthritis Other    • Mental illness Other    • Colon polyps Neg Hx    • Esophageal cancer Neg Hx    • Liver cancer Neg Hx    • Liver disease Neg Hx    • Rectal cancer Neg Hx    • Stomach cancer Neg Hx    • Breast cancer Neg Hx       reports that she quit smoking about 22 years ago. Her smoking use included cigarettes, electronic cigarette, cigarettes, and electronic cigarette. She has a 0.75 pack-year smoking history. She has never used smokeless tobacco. She reports that she does not drink alcohol and does not use drugs.      Current Outpatient Medications:   •  acetaminophen (TYLENOL) 500 MG tablet, Take 500 mg by mouth As Needed for Mild Pain ., Disp: , Rfl:   •  acetaminophen-codeine (TYLENOL #3) 300-30 MG per tablet, Take 1 tablet by mouth Every 6 (Six) Hours As Needed for Moderate Pain .,  "Disp: 90 tablet, Rfl: 0  •  alendronate (Fosamax) 70 MG tablet, Take 1 tablet by mouth Every 7 (Seven) Days., Disp: 12 tablet, Rfl: 3  •  azelastine (ASTEPRO) 0.15 % solution nasal spray, 1 spray into the nostril(s) as directed by provider 2 (Two) Times a Day., Disp: 30 mL, Rfl: 1  •  buPROPion XL (WELLBUTRIN XL) 300 MG 24 hr tablet, TAKE 1 TABLET BY MOUTH ONCE DAILY, Disp: 90 tablet, Rfl: 1  •  Calcium + Vitamin D3 600-10 MG-MCG tablet, TAKE 1 TABLET BY MOUTH 2 TIMES A DAY., Disp: 180 tablet, Rfl: 2  •  CALCIUM PO, Take  by mouth., Disp: , Rfl:   •  Diclofenac Sodium (VOLTAREN) 1 % gel gel, , Disp: , Rfl:   •  famotidine (PEPCID) 20 MG tablet, TAKE 1 TABLET BY MOUTH TWO TIMES A DAY., Disp: 60 tablet, Rfl: 4  •  fluticasone (FLONASE) 50 MCG/ACT nasal spray, INSTILL 2 SPRAYS INTO THE NOSTRILS AS DIRECTED BY PROVIDER DAILY., Disp: 16 g, Rfl: 4  •  gabapentin (NEURONTIN) 800 MG tablet, Take 1 tablet by mouth 3 (Three) Times a Day., Disp: 90 tablet, Rfl: 2  •  lansoprazole (PREVACID) 30 MG capsule, Take 1 capsule by mouth Daily., Disp: 30 capsule, Rfl: 4  •  methocarbamol (ROBAXIN) 750 MG tablet, Take 1 tablet by mouth 3 (Three) Times a Day As Needed for Muscle Spasms., Disp: 90 tablet, Rfl: 5  •  methylPREDNISolone (MEDROL) 4 MG dose pack, , Disp: , Rfl:   •  rosuvastatin (CRESTOR) 20 MG tablet, TAKE 1 TABLET BY MOUTH EVERY NIGHT., Disp: 90 tablet, Rfl: 2  •  sucralfate (Carafate) 1 g tablet, Take 1 tablet by mouth 4 (Four) Times a Day., Disp: 120 tablet, Rfl: 5  •  temazepam (RESTORIL) 30 MG capsule, Take 1 capsule by mouth At Night As Needed for Sleep., Disp: 30 capsule, Rfl: 0  •  traMADol (ULTRAM) 50 MG tablet, , Disp: , Rfl:     OBJECTIVE:  /71 (BP Location: Left arm, Patient Position: Sitting, Cuff Size: Large Adult)   Pulse 82   Temp 99.3 °F (37.4 °C) (Temporal)   Ht 162.6 cm (64\")   Wt 83.7 kg (184 lb 9.6 oz)   LMP  (LMP Unknown)   SpO2 96%   Breastfeeding No   BMI 31.69 kg/m²    Physical " Exam  Vitals reviewed.   Constitutional:       Appearance: She is well-developed.   Cardiovascular:      Rate and Rhythm: Normal rate and regular rhythm.      Heart sounds: Normal heart sounds.   Pulmonary:      Effort: Pulmonary effort is normal.      Breath sounds: Normal breath sounds.   Neurological:      Mental Status: She is alert and oriented to person, place, and time.   Psychiatric:         Behavior: Behavior normal.         Assessment/Plan    Diagnoses and all orders for this visit:    1. Primary insomnia (Primary)    Ok to refill temazepam when due.  Contract and UDS are current. Shine reviewed and appropriate. She continues to follow with Pain Management for her other controlled medications.     An After Visit Summary was printed and given to the patient at discharge.  Return in about 3 months (around 10/25/2022) for Next scheduled follow up.       YARITZA Smith 7/25/22    Electronically signed.

## 2022-08-06 DIAGNOSIS — F51.01 PRIMARY INSOMNIA: ICD-10-CM

## 2022-08-08 RX ORDER — TEMAZEPAM 30 MG/1
30 CAPSULE ORAL NIGHTLY PRN
Qty: 30 CAPSULE | Refills: 0 | Status: SHIPPED | OUTPATIENT
Start: 2022-08-08 | End: 2022-09-09 | Stop reason: SDUPTHER

## 2022-08-17 RX ORDER — AZELASTINE HCL 205.5 UG/1
SPRAY NASAL
Qty: 30 ML | Refills: 0 | Status: SHIPPED | OUTPATIENT
Start: 2022-08-17 | End: 2022-10-06 | Stop reason: SDUPTHER

## 2022-09-09 DIAGNOSIS — F51.01 PRIMARY INSOMNIA: ICD-10-CM

## 2022-09-09 RX ORDER — TEMAZEPAM 30 MG/1
30 CAPSULE ORAL NIGHTLY PRN
Qty: 30 CAPSULE | Refills: 0 | Status: SHIPPED | OUTPATIENT
Start: 2022-09-09 | End: 2022-10-06 | Stop reason: SDUPTHER

## 2022-09-09 NOTE — TELEPHONE ENCOUNTER
Drug therapy appt 7/25/22  Contract, UDS 2/11/22    Rx Refill Note  Requested Prescriptions     Pending Prescriptions Disp Refills   • temazepam (RESTORIL) 30 MG capsule 30 capsule 0     Sig: Take 1 capsule by mouth At Night As Needed for Sleep.      Last office visit with prescribing clinician: 7/25/2022      Next office visit with prescribing clinician: 10/24/2022            Juju Walsh MA  09/09/22, 10:36 CDT

## 2022-09-09 NOTE — TELEPHONE ENCOUNTER
Caller: Hanna Luciano    Relationship: Self    Best call back number: [phone]    Requested Prescriptions:   Requested Prescriptions     Pending Prescriptions Disp Refills   • temazepam (RESTORIL) 30 MG capsule 30 capsule 0     Sig: Take 1 capsule by mouth At Night As Needed for Sleep.        Pharmacy where request should be sent: [facility] [address] [phone] [facility] [phone] FX     Additional details provided by patient: PATIENT IS COMPLETELY OUT.    Does the patient have less than a 3 day supply:  [x] Yes  [] No    Aguilar Ramos Rep   09/09/22 09:38 CDT

## 2022-09-12 RX ORDER — TEMAZEPAM 30 MG/1
30 CAPSULE ORAL NIGHTLY PRN
Qty: 30 CAPSULE | Refills: 0 | OUTPATIENT
Start: 2022-09-12

## 2022-09-12 RX ORDER — SUCRALFATE 1 G/1
TABLET ORAL
Qty: 120 TABLET | Refills: 4 | Status: SHIPPED | OUTPATIENT
Start: 2022-09-12

## 2022-09-12 NOTE — TELEPHONE ENCOUNTER
Rx Refill Note  Requested Prescriptions     Pending Prescriptions Disp Refills   • sucralfate (CARAFATE) 1 g tablet [Pharmacy Med Name: SUCRALFATE 1GM] 120 tablet 4     Sig: TAKE 1 TABLET BY MOUTH FOUR TIMES DAILY      Last office visit with prescribing clinician: 7/25/2022      Next office visit with prescribing clinician: 10/24/2022            Juju Walsh MA  09/12/22, 12:10 CDT

## 2022-10-06 DIAGNOSIS — F51.01 PRIMARY INSOMNIA: ICD-10-CM

## 2022-10-06 RX ORDER — FLUTICASONE PROPIONATE 50 MCG
1 SPRAY, SUSPENSION (ML) NASAL DAILY
Qty: 16 G | Refills: 4 | Status: SHIPPED | OUTPATIENT
Start: 2022-10-06 | End: 2023-02-28 | Stop reason: SDUPTHER

## 2022-10-06 RX ORDER — TEMAZEPAM 30 MG/1
30 CAPSULE ORAL NIGHTLY PRN
Qty: 30 CAPSULE | Refills: 0 | Status: SHIPPED | OUTPATIENT
Start: 2022-10-06 | End: 2022-11-09 | Stop reason: SDUPTHER

## 2022-10-06 RX ORDER — FAMOTIDINE 20 MG/1
20 TABLET, FILM COATED ORAL 2 TIMES DAILY
Qty: 60 TABLET | Refills: 4 | Status: SHIPPED | OUTPATIENT
Start: 2022-10-06 | End: 2023-03-09 | Stop reason: SDUPTHER

## 2022-10-06 RX ORDER — AZELASTINE HCL 205.5 UG/1
2 SPRAY NASAL 2 TIMES DAILY
Qty: 30 ML | Refills: 0 | Status: SHIPPED | OUTPATIENT
Start: 2022-10-06 | End: 2022-12-14 | Stop reason: SDUPTHER

## 2022-10-06 RX ORDER — FLUTICASONE PROPIONATE 50 MCG
SPRAY, SUSPENSION (ML) NASAL
Qty: 16 G | Refills: 3 | OUTPATIENT
Start: 2022-10-06

## 2022-10-06 NOTE — TELEPHONE ENCOUNTER
Rx Refill Note  Requested Prescriptions     Pending Prescriptions Disp Refills   • famotidine (PEPCID) 20 MG tablet 60 tablet 4     Sig: Take 1 tablet by mouth 2 (Two) Times a Day.   • azelastine (ASTEPRO) 0.15 % solution nasal spray 30 mL 0   • fluticasone (FLONASE) 50 MCG/ACT nasal spray 16 g 4      Last office visit with prescribing clinician: 7/25/2022      Next office visit with prescribing clinician: 10/24/2022            Juju Walsh MA  10/06/22, 12:26 CDT

## 2022-10-06 NOTE — TELEPHONE ENCOUNTER
Drug therapy appt 7/25/22  Contract, UDS 2/11/22  LRX9/9/22    Rx Refill Note  Requested Prescriptions     Pending Prescriptions Disp Refills   • temazepam (RESTORIL) 30 MG capsule 30 capsule 0     Sig: Take 1 capsule by mouth At Night As Needed for Sleep.      Last office visit with prescribing clinician: 7/25/2022      Next office visit with prescribing clinician: 10/24/2022            Juju Walsh MA  10/06/22, 12:25 CDT

## 2022-10-06 NOTE — TELEPHONE ENCOUNTER
Caller: Hanna Luciano    Relationship: Self    Best call back number: [phone]    Requested Prescriptions:   Requested Prescriptions     Pending Prescriptions Disp Refills   • temazepam (RESTORIL) 30 MG capsule 30 capsule 0     Sig: Take 1 capsule by mouth At Night As Needed for Sleep.        Pharmacy where request should be sent: [facility] [address] [phone] [facility] [phone] FX       Does the patient have less than a 3 day supply:  [x] Yes  [] No    Aguilar Ramos Rep   10/06/22 10:57 CDT

## 2022-10-06 NOTE — TELEPHONE ENCOUNTER
Caller: Hanna Luciano    Relationship: Self    Best call back number: [phone]    Requested Prescriptions:   Requested Prescriptions     Pending Prescriptions Disp Refills   • famotidine (PEPCID) 20 MG tablet 60 tablet 4     Sig: Take 1 tablet by mouth 2 (Two) Times a Day.   • azelastine (ASTEPRO) 0.15 % solution nasal spray 30 mL 0   • fluticasone (FLONASE) 50 MCG/ACT nasal spray 16 g 4        Pharmacy where request should be sent: [facility] [address] [phone] [facility] [phone] FX       Does the patient have less than a 3 day supply:  [x] Yes  [] No    Aguilar Ramos Rep   10/06/22 10:59 CDT

## 2022-10-12 DIAGNOSIS — K21.9 GASTROESOPHAGEAL REFLUX DISEASE: ICD-10-CM

## 2022-10-12 RX ORDER — LANSOPRAZOLE 30 MG/1
CAPSULE, DELAYED RELEASE ORAL
Qty: 30 CAPSULE | Refills: 3 | Status: SHIPPED | OUTPATIENT
Start: 2022-10-12 | End: 2023-02-02

## 2022-10-12 NOTE — TELEPHONE ENCOUNTER
Rx Refill Note  Requested Prescriptions     Pending Prescriptions Disp Refills   • lansoprazole (PREVACID) 30 MG capsule [Pharmacy Med Name: LANSOPRAZOLE 30MG DR] 30 capsule 3     Sig: TAKE 1 CAPSULE BY MOUTH ONCE DAILY      Last office visit with prescribing clinician: 7/25/2022      Next office visit with prescribing clinician: 10/24/2022            Juju Walsh MA  10/12/22, 12:52 CDT

## 2022-10-13 DIAGNOSIS — F51.01 PRIMARY INSOMNIA: ICD-10-CM

## 2022-10-13 RX ORDER — TEMAZEPAM 30 MG/1
30 CAPSULE ORAL NIGHTLY PRN
Qty: 30 CAPSULE | Refills: 0 | OUTPATIENT
Start: 2022-10-13

## 2022-10-13 NOTE — TELEPHONE ENCOUNTER
Caller: Hanna Luciano    Relationship: Self    Best call back number: [phone]  Requested Prescriptions:   Requested Prescriptions     Pending Prescriptions Disp Refills   • temazepam (RESTORIL) 30 MG capsule 30 capsule 0     Sig: Take 1 capsule by mouth At Night As Needed for Sleep.        Pharmacy where request should be sent: [facility] [address] [facility] [phone] [facility] [phone] FX     Additional details provided by patient: PATIENT REQUESTING WE RESEND THIS PHARMACY TOLD PATIENT THEY DO NOT HAVE THIS  MEDICATION FOR HER   PATIENT STATES SHE IS OUT COMPLETELY     PATIENT ALSO REQUESTING WE CALL HER BACK WHEN THIS IS SENT     Does the patient have less than a 3 day supply:  [x] Yes  [] No    Aguilar Del Toro Rep   10/13/22 08:11 CDT

## 2022-11-03 RX ORDER — ROSUVASTATIN CALCIUM 20 MG/1
20 TABLET, COATED ORAL NIGHTLY
Qty: 90 TABLET | Refills: 0 | Status: SHIPPED | OUTPATIENT
Start: 2022-11-03 | End: 2023-01-23

## 2022-11-03 NOTE — TELEPHONE ENCOUNTER
Rx Refill Note  Requested Prescriptions     Pending Prescriptions Disp Refills   • rosuvastatin (CRESTOR) 20 MG tablet 90 tablet 2     Sig: Take 1 tablet by mouth Every Night.      Last office visit with prescribing clinician: 7/25/2022      Next office visit with prescribing clinician: 11/10/2022            Juju Walsh MA  11/03/22, 12:55 CDT

## 2022-11-03 NOTE — TELEPHONE ENCOUNTER
Caller: Hanna Luciano    Relationship: Self    Best call back number:[phone]    Requested Prescriptions:   Requested Prescriptions     Pending Prescriptions Disp Refills   • rosuvastatin (CRESTOR) 20 MG tablet 90 tablet 2     Sig: Take 1 tablet by mouth Every Night.        Pharmacy where request should be sent: [address] [phone]  - [phone] FX     Additional details provided by patient: PATIENT HAS BEEN OUT OF MEDICATION FOR 5 DAYS    Does the patient have less than a 3 day supply:  [x] Yes  [] No    Aguilar Deluca Rep   11/03/22 10:24 CDT

## 2022-11-09 DIAGNOSIS — F51.01 PRIMARY INSOMNIA: ICD-10-CM

## 2022-11-09 NOTE — TELEPHONE ENCOUNTER
Caller: Hanna Luciano    Relationship: Self    Best call back number: [phone]    Requested Prescriptions:   Requested Prescriptions     Pending Prescriptions Disp Refills   • temazepam (RESTORIL) 30 MG capsule 30 capsule 0     Sig: Take 1 capsule by mouth At Night As Needed for Sleep.        Pharmacy where request should be sent: [address] - [phone]  - [phone] FX     Additional details provided by patient: PATIENT STATES THIS PHARMACY WILL BE CLOSED ON THE WEEKEND.    Does the patient have less than a 3 day supply:  [] Yes  [x] No    Aguilar Ramos Rep   11/09/22 08:29 CST

## 2022-11-09 NOTE — TELEPHONE ENCOUNTER
Drug therapy appt 7/25/22  Contract, UDS 2/11/22    Rx Refill Note  Requested Prescriptions     Pending Prescriptions Disp Refills   • temazepam (RESTORIL) 30 MG capsule 30 capsule 0     Sig: Take 1 capsule by mouth At Night As Needed for Sleep.      Last office visit with prescribing clinician: 7/25/2022      Next office visit with prescribing clinician: 11/10/2022            Juju Walsh MA  11/09/22, 08:40 CST

## 2022-11-10 ENCOUNTER — IMMUNIZATION (OUTPATIENT)
Dept: FAMILY MEDICINE CLINIC | Facility: CLINIC | Age: 57
End: 2022-11-10

## 2022-11-10 ENCOUNTER — OFFICE VISIT (OUTPATIENT)
Dept: FAMILY MEDICINE CLINIC | Facility: CLINIC | Age: 57
End: 2022-11-10

## 2022-11-10 VITALS
WEIGHT: 191.2 LBS | TEMPERATURE: 98.9 F | SYSTOLIC BLOOD PRESSURE: 113 MMHG | OXYGEN SATURATION: 98 % | BODY MASS INDEX: 32.64 KG/M2 | HEIGHT: 64 IN | DIASTOLIC BLOOD PRESSURE: 80 MMHG | HEART RATE: 79 BPM

## 2022-11-10 DIAGNOSIS — F51.01 PRIMARY INSOMNIA: ICD-10-CM

## 2022-11-10 DIAGNOSIS — R82.90 ABNORMAL URINALYSIS: ICD-10-CM

## 2022-11-10 DIAGNOSIS — Z23 NEED FOR VACCINATION: Primary | ICD-10-CM

## 2022-11-10 DIAGNOSIS — Z23 ENCOUNTER FOR IMMUNIZATION: ICD-10-CM

## 2022-11-10 DIAGNOSIS — R10.84 GENERALIZED ABDOMINAL PAIN: Primary | ICD-10-CM

## 2022-11-10 LAB
BILIRUB BLD-MCNC: NEGATIVE MG/DL
CLARITY, POC: CLEAR
COLOR UR: YELLOW
GLUCOSE UR STRIP-MCNC: NEGATIVE MG/DL
KETONES UR QL: NEGATIVE
LEUKOCYTE EST, POC: ABNORMAL
NITRITE UR-MCNC: NEGATIVE MG/ML
PH UR: 6 [PH] (ref 5–8)
PROT UR STRIP-MCNC: NEGATIVE MG/DL
RBC # UR STRIP: ABNORMAL /UL
SP GR UR: 1 (ref 1–1.03)
UROBILINOGEN UR QL: ABNORMAL

## 2022-11-10 PROCEDURE — 81003 URINALYSIS AUTO W/O SCOPE: CPT | Performed by: NURSE PRACTITIONER

## 2022-11-10 PROCEDURE — 91312 COVID-19 (PFIZER) BIVALENT BOOSTER 12+YRS: CPT | Performed by: NURSE PRACTITIONER

## 2022-11-10 PROCEDURE — 99213 OFFICE O/P EST LOW 20 MIN: CPT | Performed by: NURSE PRACTITIONER

## 2022-11-10 PROCEDURE — 0124A COVID-19 (PFIZER) BIVALENT BOOSTER 12+YRS: CPT | Performed by: NURSE PRACTITIONER

## 2022-11-10 PROCEDURE — G0008 ADMIN INFLUENZA VIRUS VAC: HCPCS | Performed by: NURSE PRACTITIONER

## 2022-11-10 PROCEDURE — 90686 IIV4 VACC NO PRSV 0.5 ML IM: CPT | Performed by: NURSE PRACTITIONER

## 2022-11-10 RX ORDER — AMITRIPTYLINE HYDROCHLORIDE 10 MG/1
TABLET, FILM COATED ORAL
COMMUNITY
Start: 2022-10-06 | End: 2023-02-08 | Stop reason: SINTOL

## 2022-11-10 RX ORDER — HYDROCODONE BITARTRATE AND ACETAMINOPHEN 5; 325 MG/1; MG/1
TABLET ORAL
COMMUNITY
Start: 2022-10-07 | End: 2023-02-08 | Stop reason: DRUGHIGH

## 2022-11-10 NOTE — PROGRESS NOTES
CC: 3 month med refill and abdominal pain    History:  Hanna Luciano is a 57 y.o. female who presents today for evaluation of the above problems.      Patient is here for her refill of her temazepam that she takes for insomnia. This continues to work well for her and she has no complaints with this. Unfortunately, she is experiencing constipation. She has had no bowel movement for 7 days and attributes this to a mix up in her pain medication.  For several days she was taking both tylenol 3 and Norco and this resulted in constipation. She has not tried any laxative. She notes that she has a poor appetite - feels full - and has pain in the right CVA area that wraps around her abdomen. Her colonoscopy is up to date.     CONTROLLED SUBSTANCE TRACKING 5/10/2021 8/13/2021 11/12/2021 2/11/2022 4/25/2022 7/25/2022 11/10/2022   Last Shine 5/10/2021 8/13/2021 11/12/2021 2/11/2022 4/25/2022 7/25/2022 11/10/2022   Report Number reviewed by Karyn Claros - reviewed through EPIC reviewed through  epic reviewed through [facility] REVIEWED THROUGH [facility] reviewed through [facility]   Last UDS 1/25/2021 1/25/2021 1/25/2021 2/11/2022 2/11/2022 2/11/2022 2/11/2022   Last Controlled Substance Agreement 1/25/2021 1/25/2021 1/25/2021 2/11/2022 2/11/2022 2/11/2022 2/11/2022       HPI  ROS:  Review of Systems   Constitutional: Positive for appetite change.   Gastrointestinal: Positive for abdominal pain and constipation. Negative for nausea.   Musculoskeletal: Positive for back pain.       Allergies   Allergen Reactions   • Sulfamethoxazole-Trimethoprim Anaphylaxis   • Atorvastatin Unknown (See Comments)     Pt unsure of allergy   • Baclofen GI Intolerance   • Diphenhydramine Other (See Comments)     Pt unsure of allergy   • Lorazepam Other (See Comments)     Pt unsure of allergy   • Morphine GI Intolerance   • Penicillins Unknown (See Comments)     Pt unsure of allergy   • Soma [Carisoprodol] Unknown (See Comments)     Pt unsure of allergy   • Valproic  Acid Unknown (See Comments)     Pt unsure of allergy     Past Medical History:   Diagnosis Date   • ADHD (attention deficit hyperactivity disorder) 2003    From [facility] [place]   • Allergic Since 2001   • Arthritis    • Colon polyp 2014 & 2021    Removed 4th polp   • Depression    • Family history of colon cancer    • Generalized headaches    • GERD (gastroesophageal reflux disease)    • History of colon polyps    • HL (hearing loss) Since Birth    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   • Hyperlipidemia    • Low back pain 2003    Major Back surgery   • Neuromuscular disorder (HCC) Aug 2022    Shooting pain - Sciatic Nerve   • Osteopenia    • Tuberculosis 1998    Not active     Past Surgical History:   Procedure Laterality Date   • APPENDECTOMY     • BACK SURGERY  2003   • CHOLECYSTECTOMY     • COLONOSCOPY N/A 04/28/2021    Procedure: COLONOSCOPY WITH ANESTHESIA;  Surgeon: Sandra Nguyen MD;  Location: [facility] ENDOSCOPY;  Service: Gastroenterology;  Laterality: N/A;  preop; epogastric pain  postop; polyps   PCP Andres Martinez    • ENDOSCOPY N/A 04/28/2021    Procedure: ESOPHAGOGASTRODUODENOSCOPY WITH ANESTHESIA;  Surgeon: Sandra Nguyen MD;  Location: [facility] ENDOSCOPY;  Service: Gastroenterology;  Laterality: N/A;  preop; dysphagia  postop; normal  PCP Kirti Martinez    • EYE SURGERY Left 1965    left eyebrow   • OOPHORECTOMY Left 2000   • SPINE SURGERY     • TUBAL ABDOMINAL LIGATION  1994    I’m done being pregnant     Family History   Problem Relation Age of Onset   • Colon cancer Mother 77   • Alcohol abuse Mother    • Alcohol abuse Father    • Hyperlipidemia Other    • Heart disease Other    • Hypertension Other    • Diabetes Other    • Cancer Other    • Arthritis Other    • Mental illness Other    • Cancer Paternal Grandmother    • Alcohol abuse Sister    • Arthritis Sister    • Depression Sister    • Diabetes Sister    • Heart disease Sister    • Hyperlipidemia Sister    • Vision loss Sister    • Arthritis  Sister    • Arthritis Brother    • Cancer Brother    • COPD Sister    • Stroke Sister    • Stroke Brother    • Colon polyps Neg Hx    • Esophageal cancer Neg Hx    • Liver cancer Neg Hx    • Liver disease Neg Hx    • Rectal cancer Neg Hx    • Stomach cancer Neg Hx    • Breast cancer Neg Hx       reports that she has quit smoking. Her smoking use included electronic cigarette. She has never used smokeless tobacco. She reports that she does not currently use alcohol. She reports that she does not currently use drugs.      Current Outpatient Medications:   •  acetaminophen (TYLENOL) 500 MG tablet, Take 500 mg by mouth As Needed for Mild Pain ., Disp: , Rfl:   •  acetaminophen-codeine (TYLENOL #3) 300-30 MG per tablet, Take 1 tablet by mouth Every 6 (Six) Hours As Needed for Moderate Pain ., Disp: 90 tablet, Rfl: 0  •  alendronate (Fosamax) 70 MG tablet, Take 1 tablet by mouth Every 7 (Seven) Days., Disp: 12 tablet, Rfl: 3  •  azelastine (ASTEPRO) 0.15 % solution nasal spray, 2 sprays into the nostril(s) as directed by provider 2 (Two) Times a Day., Disp: 30 mL, Rfl: 0  •  buPROPion XL (WELLBUTRIN XL) 300 MG 24 hr tablet, TAKE 1 TABLET BY MOUTH ONCE DAILY, Disp: 90 tablet, Rfl: 1  •  Calcium + Vitamin D3 600-10 MG-MCG tablet, TAKE 1 TABLET BY MOUTH 2 TIMES A DAY., Disp: 180 tablet, Rfl: 2  •  CALCIUM PO, Take  by mouth., Disp: , Rfl:   •  Diclofenac Sodium (VOLTAREN) 1 % gel gel, , Disp: , Rfl:   •  famotidine (PEPCID) 20 MG tablet, Take 1 tablet by mouth 2 (Two) Times a Day., Disp: 60 tablet, Rfl: 4  •  fluticasone (FLONASE) 50 MCG/ACT nasal spray, 1 spray into the nostril(s) as directed by provider Daily., Disp: 16 g, Rfl: 4  •  gabapentin (NEURONTIN) 800 MG tablet, Take 1 tablet by mouth 3 (Three) Times a Day., Disp: 90 tablet, Rfl: 2  •  lansoprazole (PREVACID) 30 MG capsule, TAKE 1 CAPSULE BY MOUTH ONCE DAILY, Disp: 30 capsule, Rfl: 3  •  methocarbamol (ROBAXIN) 750 MG tablet, Take 1 tablet by mouth 3 (Three) Times  "a Day As Needed for Muscle Spasms., Disp: 90 tablet, Rfl: 5  •  rosuvastatin (CRESTOR) 20 MG tablet, Take 1 tablet by mouth Every Night., Disp: 90 tablet, Rfl: 0  •  sucralfate (CARAFATE) 1 g tablet, TAKE 1 TABLET BY MOUTH FOUR TIMES DAILY, Disp: 120 tablet, Rfl: 4  •  temazepam (RESTORIL) 30 MG capsule, Take 1 capsule by mouth At Night As Needed for Sleep., Disp: 30 capsule, Rfl: 0  •  traMADol (ULTRAM) 50 MG tablet, , Disp: , Rfl:   •  amitriptyline (ELAVIL) 10 MG tablet, , Disp: , Rfl:   •  HYDROcodone-acetaminophen (NORCO) 5-325 MG per tablet, , Disp: , Rfl:   •  magnesium citrate solution, Drink 1/2 of bottle. If no results in 4 hours drink other 1/2 of bottle., Disp: 296 mL, Rfl: 0    OBJECTIVE:  /80 (BP Location: Left arm, Patient Position: Sitting, Cuff Size: Large Adult)   Pulse 79   Temp 98.9 °F (37.2 °C) (Temporal)   Ht 162.6 cm (64\")   Wt 86.7 kg (191 lb 3.2 oz)   LMP  (LMP Unknown)   SpO2 98%   BMI 32.82 kg/m²    Physical Exam  Vitals reviewed.   Constitutional:       Appearance: She is well-developed.   Cardiovascular:      Rate and Rhythm: Normal rate.   Pulmonary:      Effort: Pulmonary effort is normal.   Abdominal:      General: Abdomen is flat. Bowel sounds are normal.      Palpations: Abdomen is soft.      Tenderness: There is abdominal tenderness (RUQ and LLQ). There is no right CVA tenderness or left CVA tenderness.   Neurological:      Mental Status: She is alert and oriented to person, place, and time.   Psychiatric:         Behavior: Behavior normal.         Assessment/Plan    Diagnoses and all orders for this visit:    1. Generalized abdominal pain (Primary)  -     CBC & Differential  -     Comprehensive Metabolic Panel  -     Amylase  -     Lipase  -     magnesium citrate solution; Drink 1/2 of bottle. If no results in 4 hours drink other 1/2 of bottle.  Dispense: 296 mL; Refill: 0    2. Primary insomnia    3. Encounter for immunization  -     FluLaval/Fluzone >6 mos " ([id number])    Shine reviewed and appropriate. Temazepam will be refilled when due.     Abdominal pain likely related to constipation. Mag citrate ordered. Patient advised that if pain does not resolve after bowels are moving to contact office for follow up.     An After Visit Summary was printed and given to the patient at discharge.  Return in about 3 months (around 2/10/2023) for Annual physical and controlled med refill.       YARITZA Smith 11/10/22    Electronically signed.

## 2022-11-11 LAB
ALBUMIN SERPL-MCNC: 4.7 G/DL (ref 3.5–5.2)
ALBUMIN/GLOB SERPL: 2 G/DL
ALP SERPL-CCNC: 80 U/L (ref 39–117)
ALT SERPL-CCNC: 13 U/L (ref 1–33)
AMYLASE SERPL-CCNC: 53 U/L (ref 28–100)
AST SERPL-CCNC: 18 U/L (ref 1–32)
BASOPHILS # BLD AUTO: 0.05 10*3/MM3 (ref 0–0.2)
BASOPHILS NFR BLD AUTO: 0.9 % (ref 0–1.5)
BILIRUB SERPL-MCNC: 0.4 MG/DL (ref 0–1.2)
BUN SERPL-MCNC: 15 MG/DL (ref 6–20)
BUN/CREAT SERPL: 14 (ref 7–25)
CALCIUM SERPL-MCNC: 10.3 MG/DL (ref 8.6–10.5)
CHLORIDE SERPL-SCNC: 99 MMOL/L (ref 98–107)
CO2 SERPL-SCNC: 31 MMOL/L (ref 22–29)
CREAT SERPL-MCNC: 1.07 MG/DL (ref 0.57–1)
EGFRCR SERPLBLD CKD-EPI 2021: 60.3 ML/MIN/1.73
EOSINOPHIL # BLD AUTO: 0.08 10*3/MM3 (ref 0–0.4)
EOSINOPHIL NFR BLD AUTO: 1.4 % (ref 0.3–6.2)
ERYTHROCYTE [DISTWIDTH] IN BLOOD BY AUTOMATED COUNT: 12.6 % (ref 12.3–15.4)
GLOBULIN SER CALC-MCNC: 2.3 GM/DL
GLUCOSE SERPL-MCNC: 98 MG/DL (ref 65–99)
HCT VFR BLD AUTO: 41.3 % (ref 34–46.6)
HGB BLD-MCNC: 14.1 G/DL (ref 12–15.9)
IMM GRANULOCYTES # BLD AUTO: 0.02 10*3/MM3 (ref 0–0.05)
IMM GRANULOCYTES NFR BLD AUTO: 0.3 % (ref 0–0.5)
LIPASE SERPL-CCNC: 27 U/L (ref 13–60)
LYMPHOCYTES # BLD AUTO: 1.27 10*3/MM3 (ref 0.7–3.1)
LYMPHOCYTES NFR BLD AUTO: 21.6 % (ref 19.6–45.3)
MCH RBC QN AUTO: 31.8 PG (ref 26.6–33)
MCHC RBC AUTO-ENTMCNC: 34.1 G/DL (ref 31.5–35.7)
MCV RBC AUTO: 93 FL (ref 79–97)
MONOCYTES # BLD AUTO: 0.59 10*3/MM3 (ref 0.1–0.9)
MONOCYTES NFR BLD AUTO: 10.1 % (ref 5–12)
NEUTROPHILS # BLD AUTO: 3.86 10*3/MM3 (ref 1.7–7)
NEUTROPHILS NFR BLD AUTO: 65.7 % (ref 42.7–76)
NRBC BLD AUTO-RTO: 0 /100 WBC (ref 0–0.2)
PLATELET # BLD AUTO: 242 10*3/MM3 (ref 140–450)
POTASSIUM SERPL-SCNC: 4.6 MMOL/L (ref 3.5–5.2)
PROT SERPL-MCNC: 7 G/DL (ref 6–8.5)
RBC # BLD AUTO: 4.44 10*6/MM3 (ref 3.77–5.28)
SODIUM SERPL-SCNC: 138 MMOL/L (ref 136–145)
WBC # BLD AUTO: 5.87 10*3/MM3 (ref 3.4–10.8)

## 2022-11-11 RX ORDER — TEMAZEPAM 30 MG/1
30 CAPSULE ORAL NIGHTLY PRN
Qty: 30 CAPSULE | Refills: 0 | Status: SHIPPED | OUTPATIENT
Start: 2022-11-11 | End: 2022-12-05 | Stop reason: SDUPTHER

## 2022-11-12 LAB
BACTERIA UR CULT: NO GROWTH
BACTERIA UR CULT: NORMAL

## 2022-11-17 ENCOUNTER — TELEPHONE (OUTPATIENT)
Dept: FAMILY MEDICINE CLINIC | Facility: CLINIC | Age: 57
End: 2022-11-17

## 2022-11-17 NOTE — TELEPHONE ENCOUNTER
Caller: Hanna Luciano    Relationship: Self    Best call back number: [phone]    Who are you requesting to speak with (clinical staff, provider,  specific staff member):     What was the call regarding: PATIENT REQUESTING AN APPT REMINDER BE MAILED TO HER ADDRESS LISTED ON FILE.    Do you require a callback: YES

## 2022-12-02 ENCOUNTER — TELEPHONE (OUTPATIENT)
Dept: FAMILY MEDICINE CLINIC | Facility: CLINIC | Age: 57
End: 2022-12-02

## 2022-12-02 NOTE — TELEPHONE ENCOUNTER
Please advise if patient needs to be seen.  Patient called yesterday and spoke with [facility].  She was advised she needed appt and declined appt for today.

## 2022-12-02 NOTE — TELEPHONE ENCOUNTER
Caller: Hanna Luciano    Relationship: Self    Best call back number: [phone]    What is the medical concern/diagnosis: MILD PROLAPSE     What specialty or service is being requested: OB/GYN    What is the provider, practice or medical service name: [facility]

## 2022-12-05 DIAGNOSIS — F51.01 PRIMARY INSOMNIA: ICD-10-CM

## 2022-12-05 RX ORDER — TEMAZEPAM 30 MG/1
30 CAPSULE ORAL NIGHTLY PRN
Qty: 30 CAPSULE | Refills: 0 | Status: SHIPPED | OUTPATIENT
Start: 2022-12-05 | End: 2023-01-13 | Stop reason: SDUPTHER

## 2022-12-05 RX ORDER — BUPROPION HYDROCHLORIDE 300 MG/1
TABLET ORAL
Qty: 90 TABLET | Refills: 0 | Status: SHIPPED | OUTPATIENT
Start: 2022-12-05 | End: 2023-02-28 | Stop reason: SDUPTHER

## 2022-12-05 NOTE — TELEPHONE ENCOUNTER
Caller: Hanna Luciano    Relationship: Self    Best call back number: [phone]    Requested Prescriptions:   Requested Prescriptions     Pending Prescriptions Disp Refills   • temazepam (RESTORIL) 30 MG capsule 30 capsule 0     Sig: Take 1 capsule by mouth At Night As Needed for Sleep.        Pharmacy where request should be sent: [address] - [phone] [facility] [phone] FX     Additional details provided by patient:   N/A     Does the patient have less than a 3 day supply:  [] Yes  [x] No    Would you like a call back once the refill request has been completed: [x] Yes [] No    If the office needs to give you a call back, can they leave a voicemail: [] Yes [x] No    Lisbeth Tolbert, PCT   12/05/22 11:46 CST

## 2022-12-05 NOTE — TELEPHONE ENCOUNTER
Rx Refill Note  Requested Prescriptions     Pending Prescriptions Disp Refills   • buPROPion XL (WELLBUTRIN XL) 300 MG 24 hr tablet [Pharmacy Med Name: bupropion HCl  mg 24 hr tablet, extended release] 90 tablet 1     Sig: TAKE 1 TABLET BY MOUTH ONCE DAILY      Last office visit with prescribing clinician: 11/10/2022   Last telemedicine visit with prescribing clinician: 2/8/2023   Next office visit with prescribing clinician: 2/8/2023                         Would you like a call back once the refill request has been completed: [] Yes [] No    If the office needs to give you a call back, can they leave a voicemail: [] Yes [] No    Juju Walsh MA  12/05/22, 07:39 CST

## 2022-12-05 NOTE — TELEPHONE ENCOUNTER
Drug therapy appt 11/10/22  Contract, UDS 2/11/22  LRX 11/11/22    Rx Refill Note  Requested Prescriptions     Pending Prescriptions Disp Refills   • temazepam (RESTORIL) 30 MG capsule 30 capsule 0     Sig: Take 1 capsule by mouth At Night As Needed for Sleep.      Last office visit with prescribing clinician: 11/10/2022   Last telemedicine visit with prescribing clinician: 2/8/2023   Next office visit with prescribing clinician: 2/8/2023                     {TIP  Is Refill Pharmacy correct?:23}    Would you like a call back once the refill request has been completed: [] Yes [] No    If the office needs to give you a call back, can they leave a voicemail: [] Yes [] No    Juju Walsh MA  12/05/22, 12:13 CST

## 2022-12-06 ENCOUNTER — OFFICE VISIT (OUTPATIENT)
Dept: OBSTETRICS AND GYNECOLOGY | Facility: CLINIC | Age: 57
End: 2022-12-06

## 2022-12-06 VITALS
DIASTOLIC BLOOD PRESSURE: 82 MMHG | BODY MASS INDEX: 32.85 KG/M2 | HEIGHT: 64 IN | WEIGHT: 192.4 LBS | SYSTOLIC BLOOD PRESSURE: 138 MMHG

## 2022-12-06 DIAGNOSIS — R10.2 PELVIC PAIN: ICD-10-CM

## 2022-12-06 DIAGNOSIS — N81.4 UTERINE PROLAPSE: ICD-10-CM

## 2022-12-06 DIAGNOSIS — N81.6 CYSTOCELE WITH RECTOCELE: ICD-10-CM

## 2022-12-06 DIAGNOSIS — Z01.411 ENCOUNTER FOR GYNECOLOGICAL EXAMINATION WITH ABNORMAL FINDING: Primary | ICD-10-CM

## 2022-12-06 DIAGNOSIS — N81.10 CYSTOCELE WITH RECTOCELE: ICD-10-CM

## 2022-12-06 PROCEDURE — 99396 PREV VISIT EST AGE 40-64: CPT | Performed by: OBSTETRICS & GYNECOLOGY

## 2022-12-06 NOTE — PROGRESS NOTES
Delfino Gaitan MD  [facility]  [address]  [place] [address]  Office [phone]  Fax [phone]      [facility]  Hanna Luciano  : 1965  MRN: [id number]    Subjective   Subjective     Chief Complaint   Patient presents with   • Follow-up     Mammogram 2021, bone density 2022 showed low bone mass. Patient here for prolapse. Patient has a lot of pain, been dealing with this for a while. Patient noticed Friday she can feel something coming out of her vagina.  ID [id number].       History of Present Illness  Hanna Luciano is a 57 y.o. female , , who comes to the office today for prolapse evaluation. Notes pressure in the vagina along with pain for the past month. States initially thought she had a UTI. Urine culture was negative. Last Friday, now noticing bulge in the vagina now, not reducing, not worsening either. Notes pain in her lower pelvis with this. History notable for two prior vaginal deliveries. First pregnancy was a forceps assisted vaginal delivery. Denies episiotomy for either deliveries. Largest baby was 9 lbs 6 oz. Reports retention. Denies stress or urgency, or incontinence. Reports constipation but not otherwise unchanged for the past few months.     Review of Systems   Genitourinary: Positive for pelvic pain (vaginal pressure/pelvic pressure). Negative for decreased urine volume, difficulty urinating, dyspareunia, dysuria, enuresis, flank pain, frequency, genital sores, hematuria, menstrual problem, urgency, vaginal bleeding, vaginal discharge and vaginal pain.   All other systems reviewed and are negative.       OB hx:  OB History    Para Term  AB Living   2 2 2 0 0 2   SAB IAB Ectopic Molar Multiple Live Births   0 0 0 0 0 2      # Outcome Date GA Lbr Srinivas/2nd Weight Sex Delivery Anes PTL Lv   2 Term      Vag-Spont   BIN   1 Term      Vag-Spont   BIN        GYN hx:  Date of LMP: No LMP recorded (lmp unknown). Patient is  postmenopausal.  Menopause: 50s  Date/Result of last Pap smear: was done on approximately 2020 and the result was: normal PAP.   History of abnormal PAP: No  Date/Result of last mammogram: was done on approximately 6/2022 and the result was: Birads I (Normal).  History of Abnormal Mammogram: No  Date/Result of last colonoscopy: had colonoscopy in 2021, polyps, repeat 5 years  History of Abnormal colonoscopy: Yes. Details: 2021  Date/Result of last DEXA: On 2022 and results were Osteoporosis  History of Abnormal DEXA: Yes. Details: 2022  Sexually active: Yes. Details: one male   FMH of Breast, Uterine, Ovarian, or Colon cancer: Yes. Details:   -mother: colon cancer  Other OB/GYN History:   -h/o FAVD x1  -h/o tubal ligation    Personal History     The following portions of the patient's history were reviewed and updated as appropriate: allergies, current medications, past family history, past medical history, past social history, past surgical history and problem list.    History Review Reviewed Comments   Past Medical History:  [x]     Past Surgical History: [x]     Family History: [x]     Social History: [x]       Current Outpatient Medications   Medication Instructions   • acetaminophen (TYLENOL) 500 mg, Oral, As Needed   • acetaminophen-codeine (TYLENOL #3) 300-30 MG per tablet 1 tablet, Oral, Every 6 Hours PRN   • alendronate (FOSAMAX) 70 mg, Oral, Every 7 Days   • amitriptyline (ELAVIL) 10 MG tablet No dose, route, or frequency recorded.   • azelastine (ASTEPRO) 0.15 % solution nasal spray 2 sprays, Nasal, 2 Times Daily   • buPROPion XL (WELLBUTRIN XL) 300 MG 24 hr tablet TAKE 1 TABLET BY MOUTH ONCE DAILY   • Calcium + Vitamin D3 600-10 MG-MCG tablet TAKE 1 TABLET BY MOUTH 2 TIMES A DAY.   • CALCIUM PO Oral   • Diclofenac Sodium (VOLTAREN) 1 % gel gel No dose, route, or frequency recorded.   • famotidine (PEPCID) 20 mg, Oral, 2 Times Daily   • fluticasone (FLONASE) 50 MCG/ACT nasal spray 1 spray, Nasal, Daily  "  • gabapentin (NEURONTIN) 800 mg, Oral, 3 Times Daily   • HYDROcodone-acetaminophen (NORCO) 5-325 MG per tablet No dose, route, or frequency recorded.   • lansoprazole (PREVACID) 30 MG capsule TAKE 1 CAPSULE BY MOUTH ONCE DAILY   • magnesium citrate solution Drink 1/2 of bottle. If no results in 4 hours drink other 1/2 of bottle.   • methocarbamol (ROBAXIN) 750 mg, Oral, 3 Times Daily PRN   • rosuvastatin (CRESTOR) 20 mg, Oral, Nightly   • sucralfate (CARAFATE) 1 g tablet TAKE 1 TABLET BY MOUTH FOUR TIMES DAILY   • temazepam (RESTORIL) 30 mg, Oral, Nightly PRN   • traMADol (ULTRAM) 50 MG tablet No dose, route, or frequency recorded.       Allergies   Allergen Reactions   • Sulfamethoxazole-Trimethoprim Anaphylaxis   • Atorvastatin Unknown (See Comments)     Pt unsure of allergy   • Baclofen GI Intolerance   • Diphenhydramine Other (See Comments)     Pt unsure of allergy   • Lorazepam Other (See Comments)     Pt unsure of allergy   • Morphine GI Intolerance   • Penicillins Unknown (See Comments)     Pt unsure of allergy   • Soma [Carisoprodol] Unknown (See Comments)     Pt unsure of allergy   • Valproic Acid Unknown (See Comments)     Pt unsure of allergy       Objective    Objective     Vitals:   Visit Vitals  /82   Ht 162.6 cm (64\")   Wt 87.3 kg (192 lb 6.4 oz)   LMP  (LMP Unknown)   BMI 33.03 kg/m²        Physical Exam  Vitals and nursing note reviewed. Exam conducted with a chaperone present.   Constitutional:       General: She is not in acute distress.     Appearance: Normal appearance. She is not ill-appearing.   HENT:      Head: Normocephalic and atraumatic.      Nose: No congestion or rhinorrhea.   Eyes:      General: No scleral icterus.        Right eye: No discharge.         Left eye: No discharge.      Extraocular Movements: Extraocular movements intact.      Conjunctiva/sclera: Conjunctivae normal.   Cardiovascular:      Rate and Rhythm: Normal rate.      Pulses: Normal pulses.   Pulmonary:      " Effort: Pulmonary effort is normal. No accessory muscle usage or respiratory distress.   Chest:      Comments: declined  Abdominal:      General: Abdomen is flat. There is no distension.      Palpations: Abdomen is soft. There is no mass.      Tenderness: There is generalized abdominal tenderness and tenderness in the suprapubic area. There is no guarding or rebound.      Hernia: No hernia is present.       Genitourinary:     General: Normal vulva.      Exam position: Lithotomy position.      Pubic Area: No rash or pubic lice.       Labia:         Right: No rash, tenderness or lesion.         Left: No rash, tenderness or lesion.       Urethra: No prolapse or urethral lesion.      Vagina: No signs of injury and foreign body. Prolapsed vaginal walls (stage 2 cystocele, stage 1 rectocele, Stage 1 uterine prolapse) present. No vaginal discharge, erythema, tenderness, bleeding or lesions.      Cervix: Normal.      Uterus: Normal.       Adnexa: Right adnexa normal and left adnexa normal.      Rectum: No external hemorrhoid.          Comments: Consent for exam obtained verbally from patient.  Musculoskeletal:      Right lower leg: No edema.      Left lower leg: No edema.   Skin:     General: Skin is warm and dry.      Coloration: Skin is not ashen, cyanotic or jaundiced.   Neurological:      General: No focal deficit present.      Mental Status: She is alert and oriented to person, place, and time.   Psychiatric:         Mood and Affect: Mood normal.         Behavior: Behavior is cooperative.         Result Review    Pap IG (Image Guided) (11/09/2020 00:44)  NILM    POC Urinalysis Dipstick, Multipro (11/10/2022 13:58)  Urine Culture - Urine, Urine, Clean Catch (11/10/2022 13:28)  Urine culture negative for growth        Assessment & Plan   Assessment / Plan     Diagnoses and all orders for this visit:    1. Encounter for gynecological examination with abnormal finding (Primary)    2. Cystocele with rectocele  -      Ambulatory Referral to Physical Therapy    3. Uterine prolapse  -     Ambulatory Referral to Physical Therapy    4. Pelvic pain        Discussion:   BMI Body mass index is 33.03 kg/m²..   Colonoscopy: Up to date.    Mammogram: Up to date.  DEXA:  Up to date.  Pap smear, per ASCCP guidelines, Up to date.  STI screening: declines  Contraception: tubal    Encouraged self breast awareness.  Encouraged proactive weight management and importance of maintaining a healthy weight.   Encouraged regular exercise and the importance of same, in regards to a healthy heart as well as helping to maintain her weight and improving her mental health.      Exam with pelvic organ prolapse. ON exam, I was able to reduce the bladder/uterus cranially. While reducing, she reports significant decrease in her pain in the pelvis. Her pain returned when I withdrew my finger from the bimanual exam (allowing the bladder and uterus to return to its resting positions. Discussed management options including conservative and surgical options. Discussed pessary along with pelvic floor PT. She is interested in a non-surgical option at this time. Pessary reviewed with the patient. Her questions were answered. She expressed understanding and wished to proceed with pessary fitting.      Follow-up: Return in about 1 week (around 12/13/2022) for pessary fitting.    Delfino Gaitan MD

## 2022-12-14 DIAGNOSIS — F51.01 PRIMARY INSOMNIA: ICD-10-CM

## 2022-12-14 RX ORDER — TEMAZEPAM 30 MG/1
30 CAPSULE ORAL NIGHTLY PRN
Qty: 30 CAPSULE | Refills: 0 | Status: CANCELLED | OUTPATIENT
Start: 2022-12-14

## 2022-12-14 RX ORDER — AZELASTINE HCL 205.5 UG/1
2 SPRAY NASAL 2 TIMES DAILY
Qty: 30 ML | Refills: 2 | Status: SHIPPED | OUTPATIENT
Start: 2022-12-14 | End: 2023-01-25

## 2022-12-14 NOTE — TELEPHONE ENCOUNTER
Caller: CHANDLER WALDRON     Relationship:  SELF     Best call back number:   [phone]      Requested Prescriptions:      azelastine (ASTEPRO) 0.15 % solution nasal spray  2 spray, 2 Times Daily     temazepam (RESTORIL) 30 MG capsule  30 mg, Nightly PRN         Pharmacy where request should be sent:    [address] [facility] - [phone]  - [phone]   [phone]  Additional details provided by patient: NONE     Does the patient have less than a 3 day supply:  [x] Yes  [] No    Would you like a call back once the refill request has been completed: [x] Yes [] No    If the office needs to give you a call back, can they leave a voicemail: [x] Yes [] No    Aguilar Felder Rep   12/14/22 09:38 CST

## 2022-12-14 NOTE — TELEPHONE ENCOUNTER
Caller: [facility] [address] - [phone] [facility] [phone] FX    Relationship: Pharmacy    Best call back number: [phone]    Requested Prescriptions:   Requested Prescriptions     Pending Prescriptions Disp Refills   • temazepam (RESTORIL) 30 MG capsule 30 capsule 0     Sig: Take 1 capsule by mouth At Night As Needed for Sleep.        Pharmacy where request should be sent: [address] - [phone] [facility] [phone] FX     Additional details provided by patient:     PHARMACY STATES THEY DID NOT RECEIVE THIS PRESCRIPTION.    Yun Thompson, RegSched Rep   12/14/22 09:52 CST

## 2022-12-14 NOTE — TELEPHONE ENCOUNTER
Temazepam RF sent to pharmacy 12/5/22.      Spoke with Gene at [facility].  Request received but has not been filled.  Will get Rx ready for patient.

## 2023-01-03 NOTE — TELEPHONE ENCOUNTER
Caller: Hanna Luciano    Relationship to patient: Self    Best call back number: [phone]    Patient is needing:     Patient was seen and was given medication. She states they are controlling her pain, and she is feeling better. She is wondering if she is still needing to see the neurosurgeon. She does not want to have surgery. She would like to remain on the same medication, because it is working.                no

## 2023-01-04 ENCOUNTER — OFFICE VISIT (OUTPATIENT)
Dept: OBSTETRICS AND GYNECOLOGY | Facility: CLINIC | Age: 58
End: 2023-01-04
Payer: MEDICARE

## 2023-01-04 VITALS
BODY MASS INDEX: 32.78 KG/M2 | WEIGHT: 192 LBS | HEIGHT: 64 IN | DIASTOLIC BLOOD PRESSURE: 82 MMHG | SYSTOLIC BLOOD PRESSURE: 130 MMHG

## 2023-01-04 DIAGNOSIS — N81.6 CYSTOCELE WITH RECTOCELE: Primary | ICD-10-CM

## 2023-01-04 DIAGNOSIS — N81.10 CYSTOCELE WITH RECTOCELE: Primary | ICD-10-CM

## 2023-01-04 DIAGNOSIS — N81.4 UTERINE PROLAPSE: ICD-10-CM

## 2023-01-04 DIAGNOSIS — Z46.89 ENCOUNTER FOR FITTING AND ADJUSTMENT OF PESSARY: ICD-10-CM

## 2023-01-04 PROCEDURE — A4562 PESSARY, NON RUBBER,ANY TYPE: HCPCS | Performed by: OBSTETRICS & GYNECOLOGY

## 2023-01-04 PROCEDURE — 1159F MED LIST DOCD IN RCRD: CPT | Performed by: OBSTETRICS & GYNECOLOGY

## 2023-01-04 PROCEDURE — 57160 INSERT PESSARY/OTHER DEVICE: CPT | Performed by: OBSTETRICS & GYNECOLOGY

## 2023-01-04 PROCEDURE — 1160F RVW MEDS BY RX/DR IN RCRD: CPT | Performed by: OBSTETRICS & GYNECOLOGY

## 2023-01-04 NOTE — PROGRESS NOTES
Delfino Gaitan MD  [facility]  [address] [place] [address]  Office [phone]  Fax [phone]      [facility]  Hanna Luciano  : 1965  MRN: [id number]    Subjective   Subjective     Chief Complaint   Patient presents with   • Follow-up     Patient here for pessary fitting. Patient denies questions or concerns today.  used, ID [id number].        History of Present Illness  Hanna Luciano is a 57 y.o. female , , who comes to the office today for pessary fitting. No changes or complaints. Has been doing pelvic floor physical therapy and reports improved results. .        Objective    Objective     Vitals:   Visit Vitals  /82   Ht 162.6 cm (64\")   Wt 87.1 kg (192 lb)   LMP  (LMP Unknown)   BMI 32.96 kg/m²        Physical Exam  Vitals reviewed.   Constitutional:       General: She is not in acute distress.     Appearance: Normal appearance. She is not ill-appearing.   HENT:      Head: Normocephalic and atraumatic.      Nose: No congestion or rhinorrhea.   Eyes:      General: No scleral icterus.        Right eye: No discharge.         Left eye: No discharge.      Extraocular Movements: Extraocular movements intact.      Conjunctiva/sclera: Conjunctivae normal.   Pulmonary:      Effort: Pulmonary effort is normal. No accessory muscle usage or respiratory distress.   Musculoskeletal:      Right lower leg: No edema.      Left lower leg: No edema.   Skin:     General: Skin is warm and dry.      Coloration: Skin is not ashen, cyanotic or jaundiced.   Neurological:      General: No focal deficit present.      Mental Status: She is alert and oriented to person, place, and time.   Psychiatric:         Mood and Affect: Mood normal.         Behavior: Behavior is cooperative.           Procedure   The following procedures were performed in the clinic today:  Remove & Insert Drug Implant    Date/Time: 2023 3:00 PM  Performed by: Delfino Gaitan MD  Authorized by: Arnie  Delfino HERNANDEZ MD       Pessary trial ring placed. #5 initially. Tolerated placement well. Reports feels as if this is going to fall out. #5 ring removed and replaced with #6. Valsalva performed and ring stayed in place. She ambulated and voided without difficulty. Reports bladder feels back in a normal position. #6 trial ring removed and replaced with her own #6 ring with support.          Assessment & Plan   Assessment / Plan     Diagnoses and all orders for this visit:    1. Cystocele with rectocele (Primary)    2. Uterine prolapse    3. Encounter for fitting and adjustment of pessary    Other orders  -     Remove & Insert Drug Implant        Discussion: Pessary placed. She tolerated placement well.     Follow-up: Return in about 4 weeks (around 2/1/2023) for pessary clean and check.    Delfino Gaitan MD

## 2023-01-13 DIAGNOSIS — F51.01 PRIMARY INSOMNIA: ICD-10-CM

## 2023-01-13 RX ORDER — TEMAZEPAM 30 MG/1
30 CAPSULE ORAL NIGHTLY PRN
Qty: 30 CAPSULE | Refills: 0 | Status: SHIPPED | OUTPATIENT
Start: 2023-01-13 | End: 2023-02-14 | Stop reason: SDUPTHER

## 2023-01-13 NOTE — TELEPHONE ENCOUNTER
Drug therapy appt 11/10/22  Contract, UDS 2/11/22  LRX 12/5/22    Rx Refill Note  Requested Prescriptions     Pending Prescriptions Disp Refills   • temazepam (RESTORIL) 30 MG capsule 30 capsule 0     Sig: Take 1 capsule by mouth At Night As Needed for Sleep.      Last office visit with prescribing clinician: 11/10/2022   Last telemedicine visit with prescribing clinician: 2/8/2023   Next office visit with prescribing clinician: 2/8/2023                         Would you like a call back once the refill request has been completed: [] Yes [] No    If the office needs to give you a call back, can they leave a voicemail: [] Yes [] No    Juju Walsh MA  01/13/23, 12:35 CST

## 2023-01-13 NOTE — TELEPHONE ENCOUNTER
Caller: Hanna Luciano    Relationship: Self   Best call back number: [phone]    Requested Prescriptions:   Requested Prescriptions     Pending Prescriptions Disp Refills   • temazepam (RESTORIL) 30 MG capsule 30 capsule 0     Sig: Take 1 capsule by mouth At Night As Needed for Sleep.        Pharmacy where request should be sent: [address] [phone] [facility] [phone] FX     Does the patient have less than a 3 day supply:  [x] Yes  [] No    Would you like a call back once the refill request has been completed: [x] Yes [] No    If the office needs to give you a call back, can they leave a voicemail: [x] Yes [] No    Aguilar Luque Rep   01/13/23 11:58 CST

## 2023-01-23 RX ORDER — ROSUVASTATIN CALCIUM 20 MG/1
20 TABLET, COATED ORAL NIGHTLY
Qty: 90 TABLET | Refills: 0 | Status: SHIPPED | OUTPATIENT
Start: 2023-01-23

## 2023-01-23 NOTE — TELEPHONE ENCOUNTER
Rx Refill Note  Requested Prescriptions     Pending Prescriptions Disp Refills   • rosuvastatin (CRESTOR) 20 MG tablet [Pharmacy Med Name: rosuvastatin 20 mg tablet] 90 tablet 0     Sig: Take 1 tablet by mouth Every Night.      Last office visit with prescribing clinician: 11/10/2022   Last telemedicine visit with prescribing clinician: 2/8/2023   Next office visit with prescribing clinician: 2/8/2023       {TIP  Please add Last Relevant Lab 4/25/22                 Would you like a call back once the refill request has been completed: [] Yes [] No    If the office needs to give you a call back, can they leave a voicemail: [] Yes [] No    Juju Walsh MA  01/23/23, 09:45 CST

## 2023-01-25 ENCOUNTER — TELEPHONE (OUTPATIENT)
Dept: FAMILY MEDICINE CLINIC | Facility: CLINIC | Age: 58
End: 2023-01-25
Payer: MEDICARE

## 2023-01-25 RX ORDER — AZELASTINE 1 MG/ML
2 SPRAY, METERED NASAL 2 TIMES DAILY
Qty: 30 ML | Refills: 12 | Status: SHIPPED | OUTPATIENT
Start: 2023-01-25 | End: 2023-03-14

## 2023-01-25 NOTE — TELEPHONE ENCOUNTER
Patient called and stated Azelastine been on back order at pharmacy for several months.  She is requesting alternative medication.

## 2023-01-25 NOTE — TELEPHONE ENCOUNTER
Can we check at [facility], or call [facility]? I haven't had anyone else say they have had trouble getting it.

## 2023-02-02 DIAGNOSIS — K21.9 GASTROESOPHAGEAL REFLUX DISEASE: ICD-10-CM

## 2023-02-02 RX ORDER — LANSOPRAZOLE 30 MG/1
CAPSULE, DELAYED RELEASE ORAL
Qty: 30 CAPSULE | Refills: 3 | Status: SHIPPED | OUTPATIENT
Start: 2023-02-02

## 2023-02-02 NOTE — TELEPHONE ENCOUNTER
Rx Refill Note  Requested Prescriptions     Pending Prescriptions Disp Refills   • lansoprazole (PREVACID) 30 MG capsule [Pharmacy Med Name: lansoprazole 30 mg capsule,delayed release] 30 capsule 3     Sig: TAKE 1 CAPSULE BY MOUTH ONCE DAILY      Last office visit with prescribing clinician: 11/10/2022   Last telemedicine visit with prescribing clinician: 2/8/2023   Next office visit with prescribing clinician: 2/8/2023                         Would you like a call back once the refill request has been completed: [] Yes [] No    If the office needs to give you a call back, can they leave a voicemail: [] Yes [] No    Juju Walsh MA  02/02/23, 09:09 CST

## 2023-02-06 ENCOUNTER — OFFICE VISIT (OUTPATIENT)
Dept: OBSTETRICS AND GYNECOLOGY | Facility: CLINIC | Age: 58
End: 2023-02-06
Payer: MEDICARE

## 2023-02-06 VITALS
SYSTOLIC BLOOD PRESSURE: 132 MMHG | HEIGHT: 64 IN | BODY MASS INDEX: 32.35 KG/M2 | DIASTOLIC BLOOD PRESSURE: 80 MMHG | WEIGHT: 189.5 LBS

## 2023-02-06 DIAGNOSIS — Z46.89 ENCOUNTER FOR PESSARY MAINTENANCE: Primary | ICD-10-CM

## 2023-02-06 DIAGNOSIS — R31.9 HEMATURIA, UNSPECIFIED TYPE: ICD-10-CM

## 2023-02-06 DIAGNOSIS — N81.4 UTERINE PROLAPSE: ICD-10-CM

## 2023-02-06 DIAGNOSIS — N95.2 ATROPHIC VAGINITIS: ICD-10-CM

## 2023-02-06 PROCEDURE — 99213 OFFICE O/P EST LOW 20 MIN: CPT | Performed by: OBSTETRICS & GYNECOLOGY

## 2023-02-06 RX ORDER — DOCUSATE SODIUM 100 MG/1
CAPSULE, LIQUID FILLED ORAL
COMMUNITY
Start: 2022-12-21 | End: 2023-03-14

## 2023-02-06 RX ORDER — CONJUGATED ESTROGENS 0.62 MG/G
CREAM VAGINAL DAILY
Qty: 30 G | Refills: 1 | Status: SHIPPED | OUTPATIENT
Start: 2023-02-06 | End: 2023-03-14

## 2023-02-06 RX ORDER — SUCRALFATE ORAL 1 G/10ML
SUSPENSION ORAL
COMMUNITY
Start: 2022-12-21 | End: 2023-02-08 | Stop reason: SDUPTHER

## 2023-02-06 RX ORDER — HYDROCODONE BITARTRATE AND ACETAMINOPHEN 7.5; 325 MG/1; MG/1
1 TABLET ORAL EVERY 8 HOURS PRN
COMMUNITY
Start: 2023-01-13 | End: 2023-03-15

## 2023-02-06 NOTE — PROGRESS NOTES
"    Delfino Gaitan MD  [facility]  [address] [facility]  [place] [address]  Office [phone]  Fax [phone]      [facility]  Hanna Luciano  : 1965  MRN: [id number]    Subjective   Subjective     Chief Complaint   Patient presents with   • Follow-up     Patient here for pessary check- patient need . Patient had #6 ring. Patient states she was having blood in her urine. Patient removed the pessary.        History of Present Illness  Hanna Luciano is a 57 y.o. female , , who comes to the office today for pessary check. Removed pessary as with bleeding with urination. Once pessary was out, reports symptoms resolved. No other complaints.       Review of Systems   Genitourinary: Positive for hematuria. Negative for decreased urine volume, difficulty urinating, dyspareunia, dysuria, enuresis, flank pain, frequency, genital sores, menstrual problem, pelvic pain, urgency, vaginal bleeding, vaginal discharge and vaginal pain.   All other systems reviewed and are negative.     Objective    Objective     Vitals:   Visit Vitals  /80   Ht 162.6 cm (64\")   Wt 86 kg (189 lb 8 oz)   LMP  (LMP Unknown)   BMI 32.53 kg/m²        Physical Exam  Vitals reviewed.   Constitutional:       General: She is not in acute distress.     Appearance: Normal appearance. She is not ill-appearing.   HENT:      Head: Normocephalic and atraumatic.      Nose: No congestion or rhinorrhea.   Eyes:      General: No scleral icterus.        Right eye: No discharge.         Left eye: No discharge.      Extraocular Movements: Extraocular movements intact.      Conjunctiva/sclera: Conjunctivae normal.   Pulmonary:      Effort: Pulmonary effort is normal. No accessory muscle usage or respiratory distress.   Musculoskeletal:      Right lower leg: No edema.      Left lower leg: No edema.   Skin:     General: Skin is warm and dry.      Coloration: Skin is not ashen, cyanotic or jaundiced.   Neurological:      General: " No focal deficit present.      Mental Status: She is alert and oriented to person, place, and time.   Psychiatric:         Mood and Affect: Mood normal.         Behavior: Behavior is cooperative.         Assessment & Plan   Assessment / Plan     Diagnoses and all orders for this visit:    1. Encounter for pessary maintenance (Primary)    2. Hematuria, unspecified type  -     Urinalysis With Microscopic - Urine, Clean Catch  -     Urine Culture - Urine, Urine, Clean Catch    3. Uterine prolapse    4. Atrophic vaginitis  -     Estrogens Conjugated (Premarin) 0.625 MG/GM vaginal cream; Insert  into the vagina Daily. Insert a pea-sized amount into the vagina before bed for 2 weeks and then insert twice a week (ie Monday/thursday)  Dispense: 30 g; Refill: 1        Discussion: Check UA. Patient wants to hold off on pessary for the time being. She is following up with pelvic floor PT. Trial of vaginal estrogen as previously with vaginal atrophy. She wants to reconsider the pessary again in the future after trying vaginal estrogen cream. She wants to come back in April to revisit the above. No other complaints.     Follow-up: Return in about 8 weeks (around 4/3/2023) for GYN f/u, 30 min, poss pessary replacement.    Delfino Gaitan MD

## 2023-02-08 ENCOUNTER — OFFICE VISIT (OUTPATIENT)
Dept: FAMILY MEDICINE CLINIC | Facility: CLINIC | Age: 58
End: 2023-02-08
Payer: MEDICARE

## 2023-02-08 VITALS
WEIGHT: 193.4 LBS | TEMPERATURE: 97.8 F | HEART RATE: 69 BPM | SYSTOLIC BLOOD PRESSURE: 102 MMHG | OXYGEN SATURATION: 97 % | DIASTOLIC BLOOD PRESSURE: 70 MMHG | HEIGHT: 64 IN | BODY MASS INDEX: 33.02 KG/M2

## 2023-02-08 DIAGNOSIS — R53.83 FATIGUE, UNSPECIFIED TYPE: ICD-10-CM

## 2023-02-08 DIAGNOSIS — Z00.00 MEDICARE ANNUAL WELLNESS VISIT, SUBSEQUENT: Primary | ICD-10-CM

## 2023-02-08 DIAGNOSIS — M51.37 DEGENERATION OF LUMBOSACRAL INTERVERTEBRAL DISC: ICD-10-CM

## 2023-02-08 DIAGNOSIS — E78.2 MIXED HYPERLIPIDEMIA: ICD-10-CM

## 2023-02-08 DIAGNOSIS — K21.9 GASTROESOPHAGEAL REFLUX DISEASE, UNSPECIFIED WHETHER ESOPHAGITIS PRESENT: ICD-10-CM

## 2023-02-08 DIAGNOSIS — M81.8 OTHER OSTEOPOROSIS WITHOUT CURRENT PATHOLOGICAL FRACTURE: ICD-10-CM

## 2023-02-08 DIAGNOSIS — Z12.11 COLON CANCER SCREENING: ICD-10-CM

## 2023-02-08 DIAGNOSIS — F32.A DEPRESSION, UNSPECIFIED DEPRESSION TYPE: ICD-10-CM

## 2023-02-08 DIAGNOSIS — R82.90 ABNORMAL URINALYSIS: ICD-10-CM

## 2023-02-08 DIAGNOSIS — Z51.81 THERAPEUTIC DRUG MONITORING: ICD-10-CM

## 2023-02-08 DIAGNOSIS — E66.9 CLASS 1 OBESITY WITH SERIOUS COMORBIDITY AND BODY MASS INDEX (BMI) OF 33.0 TO 33.9 IN ADULT, UNSPECIFIED OBESITY TYPE: ICD-10-CM

## 2023-02-08 DIAGNOSIS — F41.9 ANXIETY: ICD-10-CM

## 2023-02-08 LAB
BACTERIA UR CULT: NO GROWTH
BACTERIA UR CULT: NORMAL
BILIRUB BLD-MCNC: NEGATIVE MG/DL
CLARITY, POC: ABNORMAL
COLOR UR: YELLOW
GLUCOSE UR STRIP-MCNC: NEGATIVE MG/DL
KETONES UR QL: NEGATIVE
LEUKOCYTE EST, POC: ABNORMAL
NITRITE UR-MCNC: NEGATIVE MG/ML
PH UR: 5 [PH] (ref 5–8)
PROT UR STRIP-MCNC: NEGATIVE MG/DL
RBC # UR STRIP: ABNORMAL /UL
SP GR UR: 1.01 (ref 1–1.03)
UROBILINOGEN UR QL: ABNORMAL

## 2023-02-08 PROCEDURE — 99214 OFFICE O/P EST MOD 30 MIN: CPT | Performed by: NURSE PRACTITIONER

## 2023-02-08 PROCEDURE — 1126F AMNT PAIN NOTED NONE PRSNT: CPT | Performed by: NURSE PRACTITIONER

## 2023-02-08 PROCEDURE — G0439 PPPS, SUBSEQ VISIT: HCPCS | Performed by: NURSE PRACTITIONER

## 2023-02-08 PROCEDURE — 96160 PT-FOCUSED HLTH RISK ASSMT: CPT | Performed by: NURSE PRACTITIONER

## 2023-02-08 PROCEDURE — 1159F MED LIST DOCD IN RCRD: CPT | Performed by: NURSE PRACTITIONER

## 2023-02-08 PROCEDURE — 81003 URINALYSIS AUTO W/O SCOPE: CPT | Performed by: NURSE PRACTITIONER

## 2023-02-08 PROCEDURE — 1170F FXNL STATUS ASSESSED: CPT | Performed by: NURSE PRACTITIONER

## 2023-02-08 NOTE — PATIENT INSTRUCTIONS
Medicare Wellness  Personal Prevention Plan of Service     Date of Office Visit:    Encounter Provider:  YARITZA Talley  Place of Service:  [facility]  Patient Name: Hanna Luciano  :  1965    As part of the Medicare Wellness portion of your visit today, we are providing you with this personalized preventive plan of services (PPPS). This plan is based upon recommendations of the [place] Preventive Services Task Force (USPSTF) and the Advisory Committee on Immunization Practices (ACIP).    This lists the preventive care services that should be considered, and provides dates of when you are due. Items listed as completed are up-to-date and do not require any further intervention.    Health Maintenance   Topic Date Due    TDAP/TD VACCINES (1 - Tdap) Never done    ZOSTER VACCINE (1 of 2) 2023 (Originally 10/10/2014)    LIPID PANEL  2023    PAP SMEAR  2023    ANNUAL WELLNESS VISIT  2024    MAMMOGRAM  06/15/2024    DXA SCAN  06/15/2024    COLORECTAL CANCER SCREENING  2026    HEPATITIS C SCREENING  Completed    COVID-19 Vaccine  Completed    INFLUENZA VACCINE  Completed    Pneumococcal Vaccine 0-64  Aged Out       Orders Placed This Encounter   Procedures    Comprehensive Metabolic Panel     Order Specific Question:   Release to patient     Answer:   Routine Release    Lipid Panel    TSH     Order Specific Question:   Release to patient     Answer:   Routine Release    T4, free     Order Specific Question:   Release to patient     Answer:   Routine Release    Cologuard - Stool, Per Rectum     Standing Status:   Future     Standing Expiration Date:   2024     Order Specific Question:   Release to patient     Answer:   Routine Release    ToxASSURE Select 13 (MW) - Urine, Clean Catch     Order Specific Question:   Release to patient     Answer:   Routine Release    POC Urinalysis Dipstick, Multipro     Order Specific Question:   Release  to patient     Answer:   Routine Release    CBC & Differential     Order Specific Question:   Manual Differential     Answer:   No       No follow-ups on file.

## 2023-02-08 NOTE — PROGRESS NOTES
The ABCs of the Annual Wellness Visit  Subsequent Medicare Wellness Visit    Subjective    Hanna Luciano is a 57 y.o. female who presents for a Subsequent Medicare Wellness Visit.    The following portions of the patient's history were reviewed and   updated as appropriate: allergies, current medications, past family history, past medical history, past social history, past surgical history and problem list.    Compared to one year ago, the patient feels her physical   health is the same.    Compared to one year ago, the patient feels her mental   health is the same.    Recent Hospitalizations:  She was not admitted to the hospital during the last year.       Current Medical Providers:  Patient Care Team:  Karyn Claros APRN as PCP - General (Nurse Practitioner)  April Tolliver APRN as Nurse Practitioner (Otolaryngology)  Harpreet Rene MD as Consulting Physician (Otolaryngology)  Isa Vora APRN as Referring Physician (Family Medicine)  Tyler Howard MD as Consulting Physician (Pain Medicine)    Outpatient Medications Prior to Visit   Medication Sig Dispense Refill   • acetaminophen (TYLENOL) 500 MG tablet Take 500 mg by mouth As Needed for Mild Pain .     • alendronate (Fosamax) 70 MG tablet Take 1 tablet by mouth Every 7 (Seven) Days. 12 tablet 3   • azelastine (ASTELIN) 0.1 % nasal spray 2 sprays into the nostril(s) as directed by provider 2 (Two) Times a Day. Use in each nostril as directed 30 mL 12   • buPROPion XL (WELLBUTRIN XL) 300 MG 24 hr tablet TAKE 1 TABLET BY MOUTH ONCE DAILY 90 tablet 0   • Calcium + Vitamin D3 600-10 MG-MCG tablet TAKE 1 TABLET BY MOUTH 2 TIMES A DAY. 180 tablet 2   • CALCIUM PO Take  by mouth.     • Diclofenac Sodium (VOLTAREN) 1 % gel gel      • docusate sodium (COLACE) 100 MG capsule TAKE ONE CAPSULE BY MOUTH TWICE DAILY with water FOR constipation     • Estrogens Conjugated (Premarin) 0.625 MG/GM vaginal cream Insert  into the vagina Daily. Insert a  pea-sized amount into the vagina before bed for 2 weeks and then insert twice a week (ie Monday/thursday) 30 g 1   • famotidine (PEPCID) 20 MG tablet Take 1 tablet by mouth 2 (Two) Times a Day. 60 tablet 4   • fluticasone (FLONASE) 50 MCG/ACT nasal spray 1 spray into the nostril(s) as directed by provider Daily. 16 g 4   • gabapentin (NEURONTIN) 800 MG tablet Take 1 tablet by mouth 3 (Three) Times a Day. (Patient taking differently: Take 800 mg by mouth 3 (Three) Times a Day. Prescribed by PM) 90 tablet 2   • HYDROcodone-acetaminophen (NORCO) 7.5-325 MG per tablet Take 1 tablet by mouth Every 8 (Eight) Hours As Needed. for pain     • lansoprazole (PREVACID) 30 MG capsule TAKE 1 CAPSULE BY MOUTH ONCE DAILY 30 capsule 3   • magnesium citrate solution Drink 1/2 of bottle. If no results in 4 hours drink other 1/2 of bottle. 296 mL 0   • methocarbamol (ROBAXIN) 750 MG tablet Take 1 tablet by mouth 3 (Three) Times a Day As Needed for Muscle Spasms. 90 tablet 5   • rosuvastatin (CRESTOR) 20 MG tablet Take 1 tablet by mouth Every Night. 90 tablet 0   • sucralfate (CARAFATE) 1 g tablet TAKE 1 TABLET BY MOUTH FOUR TIMES DAILY 120 tablet 4   • temazepam (RESTORIL) 30 MG capsule Take 1 capsule by mouth At Night As Needed for Sleep. 30 capsule 0   • acetaminophen-codeine (TYLENOL #3) 300-30 MG per tablet Take 1 tablet by mouth Every 6 (Six) Hours As Needed for Moderate Pain . 90 tablet 0   • amitriptyline (ELAVIL) 10 MG tablet      • HYDROcodone-acetaminophen (NORCO) 5-325 MG per tablet      • sucralfate (CARAFATE) 1 GM/10ML suspension TAKE 10 ML BY MOUTH FOUR TIMES DAILY     • traMADol (ULTRAM) 50 MG tablet        No facility-administered medications prior to visit.       Opioid medication/s are on active medication list.  and I have evaluated her active treatment plan and pain score trends (see table).  Vitals:    02/08/23 0942   PainSc: 0-No pain     I have reviewed the chart for potential of high risk medication and harmful  "drug interactions in the elderly.            Aspirin is not on active medication list.  Aspirin use is not indicated based on review of current medical condition/s. Risk of harm outweighs potential benefits.  .    Patient Active Problem List   Diagnosis   • On long term drug therapy   • Anxiety   • Degeneration of lumbosacral intervertebral disc   • Gastroesophageal reflux disease   • Hyperlipidemia   • Insomnia   • Low back pain   • Lumbar post-laminectomy syndrome   • Depression   • Trochanteric bursitis of left hip   • Pain of left sacroiliac joint   • Trochanteric bursitis of right hip   • Pain of both hip joints   • Restless leg syndrome   • Other dysphagia   • Diarrhea   • Adenomatous colon polyp   • Former smoker   • Obesity   • Other osteoporosis without current pathological fracture     Advance Care Planning  Advance Directive is on file.  ACP discussion was held with the patient during this visit. Patient has an advance directive in EMR which is still valid.      Objective    Vitals:    02/08/23 0942   BP: 102/70   BP Location: Left arm   Patient Position: Sitting   Cuff Size: Large Adult   Pulse: 69   Temp: 97.8 °F (36.6 °C)   TempSrc: Temporal   SpO2: 97%   Weight: 87.7 kg (193 lb 6.4 oz)   Height: 162.6 cm (64\")   PainSc: 0-No pain     Estimated body mass index is 33.2 kg/m² as calculated from the following:    Height as of this encounter: 162.6 cm (64\").    Weight as of this encounter: 87.7 kg (193 lb 6.4 oz).    BMI is >= 30 and <35. (Class 1 Obesity). The following options were offered after discussion;: weight loss educational material (shared in after visit summary)      Does the patient have evidence of cognitive impairment? No          HEALTH RISK ASSESSMENT    Smoking Status:  Social History     Tobacco Use   Smoking Status Former   • Types: Electronic Cigarette   Smokeless Tobacco Never   Tobacco Comments    Cigarettes     Alcohol Consumption:  Social History     Substance and Sexual Activity "   Alcohol Use Not Currently     Fall Risk Screen:    MARCIEADI Fall Risk Assessment was completed, and patient is at LOW risk for falls.Assessment completed on:2/8/2023    Depression Screening:  PHQ-2/PHQ-9 Depression Screening 2/8/2023   Little Interest or Pleasure in Doing Things 0-->not at all   Feeling Down, Depressed or Hopeless 0-->not at all   PHQ-9: Brief Depression Severity Measure Score 0       Health Habits and Functional and Cognitive Screening:  Functional & Cognitive Status 2/8/2023   Do you have difficulty preparing food and eating? No   Do you have difficulty bathing yourself, getting dressed or grooming yourself? No   Do you have difficulty using the toilet? No   Do you have difficulty moving around from place to place? No   Do you have trouble with steps or getting out of a bed or a chair? No   Current Diet Well Balanced Diet   Dental Exam Not up to date   Eye Exam Not up to date   Exercise (times per week) 0 times per week   Current Exercises Include No Regular Exercise   Current Exercise Activities Include -   Do you need help using the phone?  No   Are you deaf or do you have serious difficulty hearing?  Yes   Do you need help with transportation? No   Do you need help shopping? No   Do you need help preparing meals?  No   Do you need help with housework?  No   Do you need help with laundry? No   Do you need help taking your medications? No   Do you need help managing money? No   Do you ever drive or ride in a car without wearing a seat belt? No   Have you felt unusual stress, anger or loneliness in the last month? No   Who do you live with? Alone   If you need help, do you have trouble finding someone available to you? No   Have you been bothered in the last four weeks by sexual problems? No   Do you have difficulty concentrating, remembering or making decisions? No       Age-appropriate Screening Schedule:  Refer to the list below for future screening recommendations based on patient's age, sex  and/or medical conditions. Orders for these recommended tests are listed in the plan section. The patient has been provided with a written plan.    Health Maintenance   Topic Date Due   • TDAP/TD VACCINES (1 - Tdap) Never done   • ZOSTER VACCINE (1 of 2) 07/25/2023 (Originally 10/10/2014)   • LIPID PANEL  04/25/2023   • PAP SMEAR  11/09/2023   • MAMMOGRAM  06/15/2024   • DXA SCAN  06/15/2024   • INFLUENZA VACCINE  Completed                CMS Preventative Services Quick Reference  Risk Factors Identified During Encounter  Depression/Dysphoria: Current medication is effective, no change recommended  Hearing Problem: Patient is deaf and uses ASL.   The above risks/problems have been discussed with the patient.  Pertinent information has been shared with the patient in the After Visit Summary.  An After Visit Summary and PPPS were made available to the patient.    Follow Up:   Next Medicare Wellness visit to be scheduled in 1 year.       Additional E&M Note during same encounter follows:  Patient has multiple medical problems which are significant and separately identifiable that require additional work above and beyond the Medicare Wellness Visit.      Chief Complaint  Medicare Wellness-subsequent (Gyn elsewhere - seeing gyn in April.  Nonfasting.  Wants to discuss weight loss options.) and Med Refill (Contract, denia wheatley)    Subjective        HPI  Hanna Luciano is also being seen today for insomnia, GERD.     Patient reports that she is doing well today.  States that she has been dealing with a bladder prolapse recently.  This is a chronic issue for her she has used a pessary in the past however has been having some irritation from it and is seeing GYN.  Since she is seeing GYN she is going to have her breast and pelvic exam through that office. Other than this, she is concerned about her weight. Weight is 193 today, with a BMI of 33.2.  She has been unable to lose weight despite diet and exercise attempts. With her  "chronic back pain, GERD, and hyperlipidemia, carrying the extra weight is problematic to her.   She does continue on temazepam for sleep and this does continue to work well for her. Contract and UDS are current. Shine is reviewed and appropriate.        Objective   Vital Signs:  /70 (BP Location: Left arm, Patient Position: Sitting, Cuff Size: Large Adult)   Pulse 69   Temp 97.8 °F (36.6 °C) (Temporal)   Ht 162.6 cm (64\")   Wt 87.7 kg (193 lb 6.4 oz)   SpO2 97%   BMI 33.20 kg/m²     Physical Exam  Vitals reviewed.   Constitutional:       Appearance: She is well-developed.   HENT:      Right Ear: Tympanic membrane, ear canal and external ear normal.      Left Ear: Tympanic membrane, ear canal and external ear normal.      Mouth/Throat:      Mouth: Mucous membranes are moist.      Pharynx: Oropharynx is clear.   Cardiovascular:      Rate and Rhythm: Normal rate and regular rhythm.      Heart sounds: Normal heart sounds.   Pulmonary:      Effort: Pulmonary effort is normal.      Breath sounds: Normal breath sounds.   Abdominal:      General: Abdomen is flat. Bowel sounds are normal.      Palpations: Abdomen is soft.   Neurological:      Mental Status: She is alert and oriented to person, place, and time.   Psychiatric:         Behavior: Behavior normal.             Assessment and Plan   Diagnoses and all orders for this visit:    1. Medicare annual wellness visit, subsequent (Primary)  -     CBC & Differential  -     Comprehensive Metabolic Panel  -     Lipid Panel  -     POC Urinalysis Dipstick, Multipro  -     TSH  -     T4, free    2. Anxiety  -     Comprehensive Metabolic Panel    3. Depression, unspecified depression type  -     CBC & Differential  -     TSH  -     T4, free    4. Degeneration of lumbosacral intervertebral disc    5. Gastroesophageal reflux disease, unspecified whether esophagitis present  -     CBC & Differential    6. Mixed hyperlipidemia  -     Lipid Panel    7. Other osteoporosis " without current pathological fracture    8. Fatigue, unspecified type  -     CBC & Differential  -     Comprehensive Metabolic Panel  -     POC Urinalysis Dipstick, Multipro  -     TSH  -     T4, free    9. Colon cancer screening  -     Cologuard - Stool, Per Rectum; Future    10. Therapeutic drug monitoring  -     ToxASSURE Select 13 (MW) - Urine, Clean Catch    11. Class 1 obesity with serious comorbidity and body mass index (BMI) of 33.0 to 33.9 in adult, unspecified obesity type  -     Liraglutide (SAXENDA) 18 MG/3ML injection pen; Inject 0.6mg under skin daily for week one, THEN 1.2mg daily for week two, THEN 1.8mg daily for week three, then 2.4mg daily for week four.  Dispense: 3 mL; Refill: 0       #[id number] was used for this visit.        Follow Up Return in about 3 months (around 5/8/2023) for Next scheduled follow up.  Patient was given instructions and counseling regarding her condition or for health maintenance advice. Please see specific information pulled into the AVS if appropriate.     Karyn Claros, APRN 2/8/23

## 2023-02-09 LAB
ALBUMIN SERPL-MCNC: 4.3 G/DL (ref 3.5–5.2)
ALBUMIN/GLOB SERPL: 2 G/DL
ALP SERPL-CCNC: 83 U/L (ref 39–117)
ALT SERPL-CCNC: 13 U/L (ref 1–33)
AST SERPL-CCNC: 16 U/L (ref 1–32)
BASOPHILS # BLD AUTO: 0.03 10*3/MM3 (ref 0–0.2)
BASOPHILS NFR BLD AUTO: 0.6 % (ref 0–1.5)
BILIRUB SERPL-MCNC: 0.3 MG/DL (ref 0–1.2)
BUN SERPL-MCNC: 17 MG/DL (ref 6–20)
BUN/CREAT SERPL: 15.3 (ref 7–25)
CALCIUM SERPL-MCNC: 10.3 MG/DL (ref 8.6–10.5)
CHLORIDE SERPL-SCNC: 102 MMOL/L (ref 98–107)
CHOLEST SERPL-MCNC: 153 MG/DL (ref 0–200)
CO2 SERPL-SCNC: 31 MMOL/L (ref 22–29)
CREAT SERPL-MCNC: 1.11 MG/DL (ref 0.57–1)
EGFRCR SERPLBLD CKD-EPI 2021: 57.7 ML/MIN/1.73
EOSINOPHIL # BLD AUTO: 0.14 10*3/MM3 (ref 0–0.4)
EOSINOPHIL NFR BLD AUTO: 2.8 % (ref 0.3–6.2)
ERYTHROCYTE [DISTWIDTH] IN BLOOD BY AUTOMATED COUNT: 12.3 % (ref 12.3–15.4)
GLOBULIN SER CALC-MCNC: 2.2 GM/DL
GLUCOSE SERPL-MCNC: 88 MG/DL (ref 65–99)
HCT VFR BLD AUTO: 40.2 % (ref 34–46.6)
HDLC SERPL-MCNC: 60 MG/DL (ref 40–60)
HGB BLD-MCNC: 13 G/DL (ref 12–15.9)
IMM GRANULOCYTES # BLD AUTO: 0.01 10*3/MM3 (ref 0–0.05)
IMM GRANULOCYTES NFR BLD AUTO: 0.2 % (ref 0–0.5)
LDLC SERPL CALC-MCNC: 69 MG/DL (ref 0–100)
LYMPHOCYTES # BLD AUTO: 1.89 10*3/MM3 (ref 0.7–3.1)
LYMPHOCYTES NFR BLD AUTO: 37.4 % (ref 19.6–45.3)
MCH RBC QN AUTO: 31.1 PG (ref 26.6–33)
MCHC RBC AUTO-ENTMCNC: 32.3 G/DL (ref 31.5–35.7)
MCV RBC AUTO: 96.2 FL (ref 79–97)
MONOCYTES # BLD AUTO: 0.6 10*3/MM3 (ref 0.1–0.9)
MONOCYTES NFR BLD AUTO: 11.9 % (ref 5–12)
NEUTROPHILS # BLD AUTO: 2.39 10*3/MM3 (ref 1.7–7)
NEUTROPHILS NFR BLD AUTO: 47.1 % (ref 42.7–76)
NRBC BLD AUTO-RTO: 0 /100 WBC (ref 0–0.2)
PLATELET # BLD AUTO: 215 10*3/MM3 (ref 140–450)
POTASSIUM SERPL-SCNC: 4.3 MMOL/L (ref 3.5–5.2)
PROT SERPL-MCNC: 6.5 G/DL (ref 6–8.5)
RBC # BLD AUTO: 4.18 10*6/MM3 (ref 3.77–5.28)
SODIUM SERPL-SCNC: 141 MMOL/L (ref 136–145)
T4 FREE SERPL-MCNC: 0.98 NG/DL (ref 0.93–1.7)
TRIGL SERPL-MCNC: 141 MG/DL (ref 0–150)
TSH SERPL DL<=0.005 MIU/L-ACNC: 3.92 UIU/ML (ref 0.27–4.2)
VLDLC SERPL CALC-MCNC: 24 MG/DL (ref 5–40)
WBC # BLD AUTO: 5.06 10*3/MM3 (ref 3.4–10.8)

## 2023-02-09 NOTE — PROGRESS NOTES
Labs all look good. No changes. Please advise her that I also sent in a cologuard test for her for colon cancer screening and explain how the test works. I forgot to mention it at her visit.

## 2023-02-10 LAB
BACTERIA UR CULT: NORMAL
BACTERIA UR CULT: NORMAL

## 2023-02-14 DIAGNOSIS — F51.01 PRIMARY INSOMNIA: ICD-10-CM

## 2023-02-14 RX ORDER — TEMAZEPAM 30 MG/1
30 CAPSULE ORAL NIGHTLY PRN
Qty: 30 CAPSULE | Refills: 0 | Status: SHIPPED | OUTPATIENT
Start: 2023-02-14 | End: 2023-03-01 | Stop reason: DRUGHIGH

## 2023-02-14 NOTE — TELEPHONE ENCOUNTER
Rx Refill Note  Requested Prescriptions     Pending Prescriptions Disp Refills   • temazepam (RESTORIL) 30 MG capsule 30 capsule 0     Sig: Take 1 capsule by mouth At Night As Needed for Sleep.      Last office visit with prescribing clinician: 2/8/2023   Last telemedicine visit with prescribing clinician: 5/8/2023   Next office visit with prescribing clinician: 5/8/2023                         Would you like a call back once the refill request has been completed: [] Yes [] No    If the office needs to give you a call back, can they leave a voicemail: [] Yes [] No    Marycruz Draper, PCT  02/14/23, 13:06 CST

## 2023-02-15 LAB — DRUGS UR: NORMAL

## 2023-02-15 NOTE — PROGRESS NOTES
Has pain management tried her on any new medications or given her any samples of medications to try?

## 2023-02-22 ENCOUNTER — TELEPHONE (OUTPATIENT)
Dept: FAMILY MEDICINE CLINIC | Facility: CLINIC | Age: 58
End: 2023-02-22
Payer: MEDICARE

## 2023-02-22 NOTE — TELEPHONE ENCOUNTER
Please advise that her UDS was positive for a controlled medication not ordered by our office or Pain Management. Her temazepam from this office will be weaned and stopped.

## 2023-02-24 ENCOUNTER — TELEPHONE (OUTPATIENT)
Dept: FAMILY MEDICINE CLINIC | Facility: CLINIC | Age: 58
End: 2023-02-24
Payer: MEDICARE

## 2023-02-24 NOTE — TELEPHONE ENCOUNTER
Contacted patient through  service.  Received letter in mail.  Unable to locate letter currently stated that it had to do with her appointments in [place] would have to be canceled.  PACS unable to transport due to patient not qualifying for their services.  Patient has contacted PACS.  Patient wanting to speak with her insurance.  Advised patient to contact number listed on back of her insurance card.

## 2023-02-24 NOTE — TELEPHONE ENCOUNTER
Caller: Mustapha Hanna KATI    Relationship: Self    Best call back number: [phone]        Who are you requesting to speak with (clinical staff, provider,  specific staff member): CLINICAL STAFF    Do you know the name of the person who called: PATIENT WITH TDD ON THE LINE    What was the call regarding: SHE DIDN'T KNOW WHY SHE RECEIVED THE LETTER REGARDING [employer]    Do you require a callback: YES

## 2023-02-24 NOTE — TELEPHONE ENCOUNTER
Contacted patient through  service and advised below message.       Patient got all of her medications while on the phone.  She stated the gyn suggested she use premarin.  Advised that is not controlled medication and would not show in drug screen.  Patient relays she is confused and not understanding.  She is concerned that this will mess things up with her pain dr and her other medications.  Advised patient that other medications prescribed by Karyn that are not controlled medications.  Patient states she is on the same medications and has not taken any other medications not prescribed by Karyn, her gyn or pain management.  Patient is agreeable to trying trazodone.      Patient has viewed 30 day letter in my chart.  I advised patient the letter was terminating relationship with patient and care would be provided for the next 30 days.  Patient still thinks mistake was made with drug test perhaps her specimen got mixed up with somebody elses.  Advised patient that the specimen is taken to the lab as soon as completed.  Patient is willing to to take another drug test.  Advised that would relay information to provider and would contact her back if any additional information needed to be relayed.

## 2023-02-24 NOTE — TELEPHONE ENCOUNTER
Please advise patient of abnormal drug screen and that her temazepam will be tapered and stopped as this is her second abnormal screen. We can replace it with trazodone if she would like.

## 2023-02-27 NOTE — TELEPHONE ENCOUNTER
Patient called using the  service.  Patient states had ordered something off of [employer] - [employer].  States she had been taking this medication.  She is in shock.        Call was dropped several times through  service.   said she could not keep trying to connect with patient.  Patient must be having issues with her internet.

## 2023-02-27 NOTE — TELEPHONE ENCOUNTER
Please advise that since her temazepam was just filled on 2/14, I will refill at a lower dose on 3/14. Please update her med list to the 15 mg dosage. That will give her from now until mid-April to establish care with a different provider.

## 2023-02-28 RX ORDER — FLUTICASONE PROPIONATE 50 MCG
1 SPRAY, SUSPENSION (ML) NASAL DAILY
Qty: 16 G | Refills: 0 | Status: SHIPPED | OUTPATIENT
Start: 2023-02-28 | End: 2023-03-23 | Stop reason: SDUPTHER

## 2023-02-28 RX ORDER — BUPROPION HYDROCHLORIDE 300 MG/1
300 TABLET ORAL DAILY
Qty: 30 TABLET | Refills: 0 | Status: SHIPPED | OUTPATIENT
Start: 2023-02-28 | End: 2023-04-03 | Stop reason: SDUPTHER

## 2023-02-28 NOTE — TELEPHONE ENCOUNTER
Rx Refill Note  Requested Prescriptions     Pending Prescriptions Disp Refills   • buPROPion XL (WELLBUTRIN XL) 300 MG 24 hr tablet 90 tablet 0     Sig: Take 1 tablet by mouth Daily.   • fluticasone (FLONASE) 50 MCG/ACT nasal spray 16 g 4     Si spray into the nostril(s) as directed by provider Daily.      Last office visit with prescribing clinician: 2023   Last telemedicine visit with prescribing clinician: Visit date not found   Next office visit with prescribing clinician: Visit date not found                         Would you like a call back once the refill request has been completed: [] Yes [] No    If the office needs to give you a call back, can they leave a voicemail: [] Yes [] No    Juju Walsh MA  23, 11:07 CST

## 2023-02-28 NOTE — TELEPHONE ENCOUNTER
Caller: Hanna Luciano    Relationship: Self    Best call back number: [phone]    Requested Prescriptions:   Requested Prescriptions     Pending Prescriptions Disp Refills   • buPROPion XL (WELLBUTRIN XL) 300 MG 24 hr tablet 90 tablet 0     Sig: Take 1 tablet by mouth Daily.   • fluticasone (FLONASE) 50 MCG/ACT nasal spray 16 g 4     Si spray into the nostril(s) as directed by provider Daily.        Pharmacy where request should be sent: [address] [phone] [facility] [phone] [facility]     Additional details provided by patient: PATIENT REQUESTING ONE LAST REFILL BEFORE FINDING A NEW PROVIDER        Aguilar Conner Rep   23 10:48 CST

## 2023-03-01 ENCOUNTER — TELEPHONE (OUTPATIENT)
Dept: FAMILY MEDICINE CLINIC | Facility: CLINIC | Age: 58
End: 2023-03-01

## 2023-03-01 RX ORDER — TEMAZEPAM 15 MG/1
15 CAPSULE ORAL NIGHTLY PRN
COMMUNITY
End: 2023-03-14

## 2023-03-01 NOTE — TELEPHONE ENCOUNTER
Caller: Hanna Luciano    Relationship: Self    Best call back number: [phone]    What is the best time to reach you:  ANYTIME    Who are you requesting to speak with (clinical staff, provider,  specific staff member):  CLINICAL/    What was the call regarding:  REQUEST  FOR OFFICE VISIT ON 3/14/23    Do you require a callback:  YES

## 2023-03-03 ENCOUNTER — TELEPHONE (OUTPATIENT)
Dept: FAMILY MEDICINE CLINIC | Facility: CLINIC | Age: 58
End: 2023-03-03
Payer: MEDICARE

## 2023-03-03 NOTE — TELEPHONE ENCOUNTER
Pt  called stating pt had questions in regards to a medication. She is not established at our care yet but is going to make a new pt appointment soon. She is going to see Dr. Lin and was requesting to speak with his nurse about a few things.

## 2023-03-06 NOTE — TELEPHONE ENCOUNTER
Called patient back and left a  with a  for patient to call at her convenience so I can try to answer any questions she may have

## 2023-03-09 RX ORDER — FAMOTIDINE 20 MG/1
20 TABLET, FILM COATED ORAL 2 TIMES DAILY
Qty: 60 TABLET | Refills: 0 | Status: SHIPPED | OUTPATIENT
Start: 2023-03-09 | End: 2023-04-03 | Stop reason: SDUPTHER

## 2023-03-09 NOTE — TELEPHONE ENCOUNTER
Rx Refill Note  Requested Prescriptions     Pending Prescriptions Disp Refills   • famotidine (PEPCID) 20 MG tablet 60 tablet 4     Sig: Take 1 tablet by mouth 2 (Two) Times a Day.      Last office visit with prescribing clinician: 2/8/2023   Last telemedicine visit with prescribing clinician: Visit date not found   Next office visit with prescribing clinician: Visit date not found   {  Juju Walsh MA  03/09/23, 09:24 CST

## 2023-03-09 NOTE — TELEPHONE ENCOUNTER
Caller: Hanna Luciano    Relationship: Self    Best call back number: [phone]    Requested Prescriptions:   Requested Prescriptions     Pending Prescriptions Disp Refills   • famotidine (PEPCID) 20 MG tablet 60 tablet 4     Sig: Take 1 tablet by mouth 2 (Two) Times a Day.        Pharmacy where request should be sent: [address] [phone]  - [phone] [facility]     Additional details provided by patient: ONE DAY LEFT    Does the patient have less than a 3 day supply:  [x] Yes  [] No    Would you like a call back once the refill request has been completed: [] Yes [x] No    If the office needs to give you a call back, can they leave a voicemail: [] Yes [x] No    Aguilar Ruff Rep   03/09/23 08:56 CST

## 2023-03-14 ENCOUNTER — OFFICE VISIT (OUTPATIENT)
Dept: FAMILY MEDICINE CLINIC | Facility: CLINIC | Age: 58
End: 2023-03-14
Payer: MEDICARE

## 2023-03-14 ENCOUNTER — PATIENT ROUNDING (BHMG ONLY) (OUTPATIENT)
Dept: FAMILY MEDICINE CLINIC | Facility: CLINIC | Age: 58
End: 2023-03-14
Payer: MEDICARE

## 2023-03-14 VITALS
DIASTOLIC BLOOD PRESSURE: 103 MMHG | BODY MASS INDEX: 32.61 KG/M2 | HEART RATE: 78 BPM | TEMPERATURE: 97.8 F | OXYGEN SATURATION: 94 % | RESPIRATION RATE: 20 BRPM | HEIGHT: 64 IN | SYSTOLIC BLOOD PRESSURE: 130 MMHG | WEIGHT: 191 LBS

## 2023-03-14 DIAGNOSIS — E66.9 CLASS 1 OBESITY WITH SERIOUS COMORBIDITY AND BODY MASS INDEX (BMI) OF 33.0 TO 33.9 IN ADULT, UNSPECIFIED OBESITY TYPE: ICD-10-CM

## 2023-03-14 DIAGNOSIS — J30.1 NON-SEASONAL ALLERGIC RHINITIS DUE TO POLLEN: ICD-10-CM

## 2023-03-14 DIAGNOSIS — Z00.00 ENCOUNTER FOR MEDICAL EXAMINATION TO ESTABLISH CARE: Primary | ICD-10-CM

## 2023-03-14 DIAGNOSIS — E78.2 MIXED HYPERLIPIDEMIA: ICD-10-CM

## 2023-03-14 DIAGNOSIS — M81.8 OTHER OSTEOPOROSIS WITHOUT CURRENT PATHOLOGICAL FRACTURE: ICD-10-CM

## 2023-03-14 DIAGNOSIS — M51.37 DEGENERATION OF LUMBOSACRAL INTERVERTEBRAL DISC: ICD-10-CM

## 2023-03-14 DIAGNOSIS — R79.9 ABNORMAL FINDING OF BLOOD CHEMISTRY, UNSPECIFIED: ICD-10-CM

## 2023-03-14 DIAGNOSIS — K21.9 GASTROESOPHAGEAL REFLUX DISEASE, UNSPECIFIED WHETHER ESOPHAGITIS PRESENT: ICD-10-CM

## 2023-03-14 DIAGNOSIS — N28.9 RENAL INSUFFICIENCY: ICD-10-CM

## 2023-03-14 DIAGNOSIS — R03.0 SINGLE EPISODE OF ELEVATED BLOOD PRESSURE: ICD-10-CM

## 2023-03-14 DIAGNOSIS — F51.01 PRIMARY INSOMNIA: ICD-10-CM

## 2023-03-14 DIAGNOSIS — F32.A DEPRESSION, UNSPECIFIED DEPRESSION TYPE: ICD-10-CM

## 2023-03-14 DIAGNOSIS — G25.81 RESTLESS LEG SYNDROME: ICD-10-CM

## 2023-03-14 PROBLEM — R13.19 OTHER DYSPHAGIA: Status: RESOLVED | Noted: 2021-03-05 | Resolved: 2023-03-14

## 2023-03-14 PROBLEM — R19.7 DIARRHEA: Status: RESOLVED | Noted: 2021-03-05 | Resolved: 2023-03-14

## 2023-03-14 PROCEDURE — 99214 OFFICE O/P EST MOD 30 MIN: CPT | Performed by: STUDENT IN AN ORGANIZED HEALTH CARE EDUCATION/TRAINING PROGRAM

## 2023-03-14 RX ORDER — AMITRIPTYLINE HYDROCHLORIDE 10 MG/1
10 TABLET, FILM COATED ORAL NIGHTLY
COMMUNITY

## 2023-03-14 RX ORDER — AZELASTINE 1 MG/ML
2 SPRAY, METERED NASAL 2 TIMES DAILY
COMMUNITY

## 2023-03-14 RX ORDER — TEMAZEPAM 30 MG/1
30 CAPSULE ORAL NIGHTLY PRN
COMMUNITY
End: 2023-03-15 | Stop reason: SDUPTHER

## 2023-03-14 RX ORDER — ALENDRONATE SODIUM 70 MG/1
70 TABLET ORAL
COMMUNITY

## 2023-03-14 RX ORDER — METHYLPREDNISOLONE 4 MG/1
4 TABLET ORAL DAILY
COMMUNITY

## 2023-03-14 RX ORDER — CETIRIZINE HYDROCHLORIDE 10 MG/1
10 TABLET ORAL DAILY
COMMUNITY

## 2023-03-14 NOTE — PROGRESS NOTES
Chief Complaint  No chief complaint on file.    Subjective        Hanna Luciano presents to [facility]    HPI    Establish care  -Patient presenting today to establish care.  Communication during visit was facilitated by  via iPad.  Patient was being seen another [facility] primary care clinic.  It appears there was some disagreement over drug testing that indigent care at the previous clinic.  She has been on temazepam for some time for restless leg syndrome and help her sleep.  She had drug testing done last month which showed tapentadol present which patient denies ever using or having prescription for.  She thinks that that lab made in error.  She does admit that for strep test was positive for THC which she said was from Gummies that she was taking and has since stopped.  She also sees pain management which was prescribing her gabapentin and hydrocodone.  She says she does not trust the lab performing the testing and self discontinued hydrocodone last month.  She is also taking Robaxin and amitriptyline which are helping with pain and nerve symptoms.  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   • Hyperlipidemia    • Low back pain 2003    Major Back surgery   • Neuromuscular disorder (HCC) Aug 2022    Shooting pain - Sciatic Nerve   • Osteopenia    • Tuberculosis 1998    Not active   • Urinary tract infection 2021     Past Surgical History:   Procedure Laterality Date   • APPENDECTOMY     • BACK SURGERY   "2003   • CHOLECYSTECTOMY     • COLONOSCOPY N/A 04/28/2021    Procedure: COLONOSCOPY WITH ANESTHESIA;  Surgeon: Sandra Nguyen MD;  Location:  [facility];  Service: Gastroenterology;  Laterality: N/A;  preop; epogastric pain  postop; polyps   PCP Andres Martinez    • ENDOSCOPY N/A 04/28/2021    Procedure: ESOPHAGOGASTRODUODENOSCOPY WITH ANESTHESIA;  Surgeon: Sandra Nguyen MD;  Location:  [facility];  Service: Gastroenterology;  Laterality: N/A;  preop; dysphagia  postop; normal  PCP Kirti Martinez    • EYE SURGERY Left 1965    left eyebrow   • OOPHORECTOMY Left 2000   • SPINE SURGERY     • TUBAL ABDOMINAL LIGATION  1994    I’m done being pregnant     Social History     Socioeconomic History   • Marital status:    Tobacco Use   • Smoking status: Former     Types: Electronic Cigarette     Passive exposure: Never   • Smokeless tobacco: Never   • Tobacco comments:     Cigarettes   Vaping Use   • Vaping Use: Never used   Substance and Sexual Activity   • Alcohol use: Not Currently   • Drug use: Not Currently     Types: Hydrocodone     Comment: I don’t take Tylenol 3 now.   • Sexual activity: Not Currently     Partners: Male     Birth control/protection: Post-menopausal, None, Tubal ligation       Objective   Vital Signs:  BP (!) 130/103 (BP Location: Right arm, Patient Position: Sitting, Cuff Size: Adult)   Pulse 78   Temp 97.8 °F (36.6 °C) (Temporal)   Resp 20   Ht 162.6 cm (64.02\")   Wt 86.6 kg (191 lb)   SpO2 94%   BMI 32.77 kg/m²   Estimated body mass index is 32.77 kg/m² as calculated from the following:    Height as of this encounter: 162.6 cm (64.02\").    Weight as of this encounter: 86.6 kg (191 lb).       BMI is >= 30 and <35. (Class 1 Obesity). The following options were offered after discussion;: nutrition counseling/recommendations      Physical Exam  Vitals reviewed.   Constitutional:       Appearance: Normal appearance.   HENT:      Head: Normocephalic.      Nose: Nose normal.      " Mouth/Throat:      Mouth: Mucous membranes are moist.   Eyes:      Extraocular Movements: Extraocular movements intact.   Cardiovascular:      Rate and Rhythm: Normal rate and regular rhythm.      Heart sounds: Normal heart sounds.   Pulmonary:      Effort: Pulmonary effort is normal.      Breath sounds: Normal breath sounds.   Musculoskeletal:         General: Normal range of motion.      Cervical back: Normal range of motion.   Skin:     General: Skin is warm and dry.   Neurological:      General: No focal deficit present.      Mental Status: She is alert and oriented to person, place, and time.      Comments: Hearing impaired (patient is deaf)   Psychiatric:         Mood and Affect: Mood normal.         Behavior: Behavior normal.        Result Review :  The following data was reviewed by: Timmy Lin MD on 03/14/2023:  Lab Choices:   CBC:Lab Results - Last 18 Months   Lab Units 02/08/23  0912 11/10/22  1229 04/25/22  0926 11/12/21  0843   WBC 10*3/mm3 5.06 5.87 5.75 4.84   HEMOGLOBIN g/dL 13.0 14.1 13.3 12.6   HEMATOCRIT % 40.2 41.3 41.5 40.5   PLATELETS 10*3/mm3 215 242 230 222      A1C:No results for input(s): HGBA1C in the last 47882 hours.  BMP/CMP:Lab Results - Last 18 Months   Lab Units 02/08/23  0912 11/10/22  1229 04/25/22  0926 11/30/21  0742 11/12/21  0843   SODIUM mmol/L 141 138 141 138 137   POTASSIUM mmol/L 4.3 4.6 4.2 3.9 4.5   CHLORIDE mmol/L 102 99 100 99 100   TOTAL CO2 mmol/L 31.0* 31.0* 29.9* 29.2* 29.5*   GLUCOSE mg/dL 88 98 94 90 74   BUN mg/dL 17 15 13 14 13   CREATININE mg/dL 1.11* 1.07* 1.13* 1.00 1.14*   EGFR IF NONAFRICN AM mL/min/1.73  --   --   --  57* 49*   EGFR IF AFRICN AM mL/min/1.73  --   --   --  69 60*   EGFR RESULT mL/min/1.73 57.7* 60.3 56.9*  --   --    CALCIUM mg/dL 10.3 10.3 9.5 9.7 9.6     THYROID:Lab Results - Last 18 Months   Lab Units 02/08/23  0912 11/12/21  0843   TSH uIU/mL 3.920 2.410   FREE T4 ng/dL 0.98 0.94     Vitamin B-12:No results for input(s): [id number],  FOLATE in the last 93418 hours.  LIPID:Lab Results - Last 18 Months   Lab Units 02/08/23  0912 04/25/22  0926 11/12/21  0843   CHOLESTEROL mg/dL 153 159 154   LDL CHOL mg/dL 69 62 61   HDL CHOL mg/dL 60 74* 76*   TRIGLYCERIDES mg/dL 141 136 94     PSA:No results found for: PSA  Vit D:No results for input(s): FXIC42VZ in the last 05042 hours.  Iron: No results for input(s): IRON, LABIRON, TRANSFERRIN, TIBC in the last 48972 hours.               Assessment and Plan   Diagnoses and all orders for this visit:    1. Encounter for medical examination to establish care (Primary)    2. Restless leg syndrome  -Discussed drug testing episode with patient.  It is certainly possible error was made by lab.  Discussed need to not take illegal substances/prescription drugs not prescribed to her while also being prescribed controlled substances by me.  For now we will continue Restoril and UDS at next visit  -     temazepam (RESTORIL) 30 MG capsule; Take 1 capsule by mouth At Night As Needed for Sleep.  Dispense: 30 capsule; Refill: 0    3. Primary insomnia  -Continue Restoril/amitriptyline    4. Degeneration of lumbosacral intervertebral disc  -Per pain management, on gabapentin, amitriptyline, Robaxin    5. Single episode of elevated blood pressure  -We will monitor for now, patient very nervous today. Mild renal insufficiency noted on prev labs, will repeat CMP with labs prior to next visit.    6. Mixed hyperlipidemia  -Continue Crestor    7. Class 1 obesity with serious comorbidity and body mass index (BMI) of 33.0 to 33.9 in adult, unspecified obesity type    8. Other osteoporosis without current pathological fracture  -On Fosamax.  We will discuss medication management and follow-up DEXA at next visit    9. Depression, unspecified depression type  -Continue Wellbutrin    10. Gastroesophageal reflux disease, unspecified whether esophagitis present  -Continue Pepcid and Prevacid    11. Non-seasonal allergic rhinitis due to  pollen  -Continue Flonase and Zyrtec    Discuss remainder of labs and wellness topics next visit.    EMR Dragon/Transcription disclaimer:   Much of this encounter note is an electronic transcription/translation of spoken language to printed text. The electronic translation of spoken language may permit erroneous, or at times, nonsensical words or phrases to be inadvertently transcribed; although attempts have made to review the note for such errors, some may still exist. Please excuse any unrecognized transcription errors and contact us if the air is unintelligible or needs documented correction. Also, portions of this note have been copied forward, however, changed to reflect the most current clinical status of this patient.  Follow Up   Return in about 3 months (around 6/14/2023) for Fu 3 mo labs beforehand.  Patient was given instructions and counseling regarding her condition or for health maintenance advice. Please see specific information pulled into the AVS if appropriate.

## 2023-03-14 NOTE — PROGRESS NOTES
March 14, 2023    Hello, may I speak with Hanna Luciano?    My name is Faith     I am  with [facility]  [address] [address] [phone].    Before we get started may I verify your date of birth? 1965    I am calling to officially welcome you to our practice and ask about your recent visit. Is this a good time to talk? yes    Tell me about your visit with us. What things went well?  Dr. Lin is outstanding        We're always looking for ways to make our patients' experiences even better. Do you have recommendations on ways we may improve?  no    Overall were you satisfied with your first visit to our practice? yes       I appreciate you taking the time to speak with me today. Is there anything else I can do for you? no      Thank you, and have a great day.

## 2023-03-15 ENCOUNTER — TELEPHONE (OUTPATIENT)
Dept: FAMILY MEDICINE CLINIC | Facility: CLINIC | Age: 58
End: 2023-03-15

## 2023-03-15 PROBLEM — J30.1 NON-SEASONAL ALLERGIC RHINITIS DUE TO POLLEN: Status: ACTIVE | Noted: 2023-03-15

## 2023-03-15 RX ORDER — TEMAZEPAM 30 MG/1
30 CAPSULE ORAL NIGHTLY PRN
Qty: 30 CAPSULE | Refills: 0 | Status: SHIPPED | OUTPATIENT
Start: 2023-03-15

## 2023-03-15 NOTE — TELEPHONE ENCOUNTER
Caller: Mustapha Hanna KATI    Relationship: Self    Best call back number: [phone]    What is the best time to reach you: ANY    Who are you requesting to speak with (clinical staff, provider,  specific staff member): CLINICAL         What was the call regarding: PATIENT DOES NOT UNDERSTAND OR NEEDS CLARIFICATION ON THE 5 MEDICATIONS SHE IS TO DISCONTINUE, AND THE 3 NEW ONES SHE IS TO START.  ALSO, SHE WENT TO [facility] AND THEY DENIED HER FOR BLOOD DRAW.      Do you require a callback: YES.

## 2023-03-16 ENCOUNTER — PATIENT MESSAGE (OUTPATIENT)
Dept: FAMILY MEDICINE CLINIC | Facility: CLINIC | Age: 58
End: 2023-03-16
Payer: MEDICARE

## 2023-03-16 NOTE — TELEPHONE ENCOUNTER
I have sent her a [employer] message it showes she is active it maybe a little easier to communicate the list of medications that there is questions about If I dont get a reply by the end of the day I will call her

## 2023-03-17 NOTE — TELEPHONE ENCOUNTER
From: Hanna Luciano  To: Timmyanika Lin  Sent: 3/16/2023 2:59 PM CDT  Subject: Exhausted     Hi!     I appreciated the refilled for my temazepam.     I’ve been having problem with BM/ urine. I take Milk Magnesium and Tylenol for headaches and migraines. I have bladder prolapse since 2022 and I saw Ob~GYN Dr. Gaitan. I need to pay off my Bill first before I can see him again. My question here is why am I feeling so exhausted after doing light errands and moderate exercise? I stayed in bed for 3 days. I have never felt so tired before. I’m drinking herbal tea to keep myself hydrated. I have no idea what’s going with me.     Thank you for your time.     Hanna

## 2023-03-23 RX ORDER — FLUTICASONE PROPIONATE 50 MCG
1 SPRAY, SUSPENSION (ML) NASAL DAILY
Qty: 16 G | Refills: 0 | Status: SHIPPED | OUTPATIENT
Start: 2023-03-23

## 2023-03-23 NOTE — TELEPHONE ENCOUNTER
Caller: Hanna Luciano    Relationship: Self    Best call back number: [phone]    Requested Prescriptions:   Requested Prescriptions     Pending Prescriptions Disp Refills   • fluticasone (FLONASE) 50 MCG/ACT nasal spray 16 g 0     Si spray into the nostril(s) as directed by provider Daily.        Pharmacy where request should be sent: [address] [phone] [facility] [phone] [facility]     Last office visit with prescribing clinician: 3/14/2023   Last telemedicine visit with prescribing clinician: 2023   Next office visit with prescribing clinician: 2023     Additional details provided by patient: COMPLETELY OUT    Does the patient have less than a 3 day supply:  [x] Yes  [] No    Would you like a call back once the refill request has been completed: [x] Yes [] No    If the office needs to give you a call back, can they leave a voicemail: [] Yes [x] No    PATIENT IS REQUESTING A [employer] MESSAGE WHEN PRESCRIPTION IS SENT.     Aguilar Ruff Rep   23 09:25 CDT

## 2023-04-03 ENCOUNTER — PATIENT MESSAGE (OUTPATIENT)
Dept: FAMILY MEDICINE CLINIC | Facility: CLINIC | Age: 58
End: 2023-04-03
Payer: MEDICARE

## 2023-04-03 ENCOUNTER — TELEPHONE (OUTPATIENT)
Dept: FAMILY MEDICINE CLINIC | Facility: CLINIC | Age: 58
End: 2023-04-03
Payer: MEDICARE

## 2023-04-03 DIAGNOSIS — K21.9 GASTROESOPHAGEAL REFLUX DISEASE, UNSPECIFIED WHETHER ESOPHAGITIS PRESENT: ICD-10-CM

## 2023-04-03 DIAGNOSIS — F32.A DEPRESSION, UNSPECIFIED DEPRESSION TYPE: Primary | ICD-10-CM

## 2023-04-03 RX ORDER — FAMOTIDINE 20 MG/1
TABLET, FILM COATED ORAL
Qty: 60 TABLET | Refills: 0 | OUTPATIENT
Start: 2023-04-03

## 2023-04-03 RX ORDER — BUPROPION HYDROCHLORIDE 300 MG/1
300 TABLET ORAL DAILY
Qty: 30 TABLET | Refills: 0 | OUTPATIENT
Start: 2023-04-03

## 2023-04-03 NOTE — TELEPHONE ENCOUNTER
Caller: CHANDLER WALDRON     Relationship: SELF   Best call back number: [phone] (Home)    Requested Prescriptions:       buPROPion XL (WELLBUTRIN XL) 300 MG 24 hr tablet  300 mg, Daily         Pharmacy where request should be sent:    [facility] [address] [facility] - [phone]  - [phone]   [phone]  Last office visit with prescribing clinician: 3/14/2023   Last telemedicine visit with prescribing clinician: 6/12/2023   Next office visit with prescribing clinician: 6/14/2023     Additional details provided by patient:  REQUESTING CALL BACK SHE STATES SHE IS COMPLETELY OUT OF THE MEDICATION AND WOULD LIKE TO BE ADVISED OF THE REASON FOR DENIAL     SHE STATES SHE HAS BEEN TAKING THE MEDICATION SINCE SHE HAS BEEN IN HER 30'S    Does the patient have less than a 3 day supply:  [x] Yes  [] No    Would you like a call back once the refill request has been completed: [x] Yes [] No    If the office needs to give you a call back, can they leave a voicemail: [x] Yes [] No    Aguilar Felder Rep   04/03/23 09:12 CDT

## 2023-04-04 RX ORDER — BUPROPION HYDROCHLORIDE 300 MG/1
300 TABLET ORAL DAILY
Qty: 90 TABLET | Refills: 0 | Status: SHIPPED | OUTPATIENT
Start: 2023-04-04

## 2023-04-04 RX ORDER — FAMOTIDINE 20 MG/1
20 TABLET, FILM COATED ORAL 2 TIMES DAILY
Qty: 180 TABLET | Refills: 0 | Status: SHIPPED | OUTPATIENT
Start: 2023-04-04

## 2023-04-13 ENCOUNTER — OFFICE VISIT (OUTPATIENT)
Dept: FAMILY MEDICINE CLINIC | Facility: CLINIC | Age: 58
End: 2023-04-13
Payer: MEDICARE

## 2023-04-13 ENCOUNTER — TELEPHONE (OUTPATIENT)
Dept: FAMILY MEDICINE CLINIC | Facility: CLINIC | Age: 58
End: 2023-04-13

## 2023-04-13 VITALS
RESPIRATION RATE: 20 BRPM | TEMPERATURE: 98.1 F | WEIGHT: 190 LBS | SYSTOLIC BLOOD PRESSURE: 126 MMHG | HEART RATE: 69 BPM | BODY MASS INDEX: 32.44 KG/M2 | HEIGHT: 64 IN | DIASTOLIC BLOOD PRESSURE: 90 MMHG | OXYGEN SATURATION: 100 %

## 2023-04-13 DIAGNOSIS — M96.1 LUMBAR POST-LAMINECTOMY SYNDROME: Primary | ICD-10-CM

## 2023-04-13 RX ORDER — HYDROCODONE BITARTRATE AND ACETAMINOPHEN 7.5; 325 MG/1; MG/1
1 TABLET ORAL EVERY 8 HOURS PRN
Qty: 90 TABLET | Refills: 0 | Status: SHIPPED | OUTPATIENT
Start: 2023-04-13 | End: 2023-04-14 | Stop reason: SDUPTHER

## 2023-04-13 RX ORDER — SUCRALFATE 1 G/1
1 TABLET ORAL 4 TIMES DAILY
Qty: 120 TABLET | Refills: 10 | Status: SHIPPED | OUTPATIENT
Start: 2023-04-13

## 2023-04-13 RX ORDER — METHYLPREDNISOLONE 4 MG/1
TABLET ORAL
Qty: 21 TABLET | Refills: 0 | Status: SHIPPED | OUTPATIENT
Start: 2023-04-13

## 2023-04-13 NOTE — TELEPHONE ENCOUNTER
Caller: Mustapha Hanna PARIKH    Relationship: Self    Best call back number: [phone]    What is the best time to reach you: ANYTIME    Who are you requesting to speak with (clinical staff, provider,  specific staff member): CLINICAL    What was the call regarding: STATING THAT   [place], [place] - [address] [facility] - [phone]  - [phone]   [address] [facility] [address]  Phone: [phone] Fax: [phone]  DOES NOT HAVE ANY HYDROCODONE IN STOCK. WANTING TO SEEK ALTERNATIVES.    Do you require a callback: YES, IF PATIENT DOES NOT ANSWER, PLEASE LEAVE A VOICEMAIL.

## 2023-04-13 NOTE — PROGRESS NOTES
Mothers name is maria eugenia and is a alcoholic and there has been some name mix ups, dads name is pari.

## 2023-04-13 NOTE — PROGRESS NOTES
Chief Complaint  Medication Problem (Questions- ), Knee Pain (Has been doing low impact exercise ), and Back Pain (See's pain doc on 5th of June.  Will Being seeing paulo new doc / )    Subjective        Hanna Luciano presents to [facility]    HPI    (History obtained with aid of ASL video  Tiffany, #[id number])    Back pain  Having generalized back pain flare up, knee pain. She is trying to be more physically active.  She recently self-discontinued norco rx prescribed by PM and over last month has realized she needs the norco to manage her pain. D/t another provider leaving PM office her next appt got moved out to 6/2023 and is inquiring about help filling norco which she is out of until that appt. She is also asking if otc capsaicin cream and eye supplement are ok to take.       Past Medical History:   Diagnosis Date   • ADHD (attention deficit hyperactivity disorder) 2003    From [facility] in [place]   • Allergic Since 2001   • Anxiety     Light   • Arthritis    • Colon polyp 2014 & 2021    Removed 4th pol   • Depression    • Family history of colon cancer    • Generalized headaches    • GERD (gastroesophageal reflux disease)    • History of colon polyps    • HL (hearing loss) Since Birth    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   • Hyperlipidemia    • Low back pain 2003    Major Back surgery   • Neuromuscular disorder Aug 2022    Shooting pain - Sciatic Nerve   • Osteopenia    • Tuberculosis 1998    Not active   • Urinary tract infection 2021     Past Surgical History:   Procedure Laterality Date   • APPENDECTOMY     • BACK SURGERY  2003   • CHOLECYSTECTOMY     • COLONOSCOPY N/A 04/28/2021    Procedure: COLONOSCOPY WITH ANESTHESIA;  Surgeon: Sandra Nguyen MD;  Location: [facility];  Service: Gastroenterology;  Laterality: N/A;  preop; epogastric pain  postop; polyps   PCP Andres Martinez    • ENDOSCOPY N/A 04/28/2021    Procedure: ESOPHAGOGASTRODUODENOSCOPY WITH  "ANESTHESIA;  Surgeon: Sandra Nguyen MD;  Location: [facility];  Service: Gastroenterology;  Laterality: N/A;  preop; dysphagia  postop; normal  PCP Kirti Martinez    • EYE SURGERY Left 1965    left eyebrow   • OOPHORECTOMY Left 2000   • SPINE SURGERY     • TUBAL ABDOMINAL LIGATION  1994    I’m done being pregnant     Social History     Socioeconomic History   • Marital status:    Tobacco Use   • Smoking status: Former     Types: Electronic Cigarette     Passive exposure: Never   • Smokeless tobacco: Never   • Tobacco comments:     Cigarettes   Vaping Use   • Vaping Use: Never used   Substance and Sexual Activity   • Alcohol use: Not Currently   • Drug use: Not Currently     Types: Hydrocodone     Comment: I don’t take Tylenol 3 now.   • Sexual activity: Not Currently     Partners: Male     Birth control/protection: Post-menopausal, None, Tubal ligation       Objective   Vital Signs:  /90 (BP Location: Left arm, Patient Position: Sitting, Cuff Size: Adult)   Pulse 69   Temp 98.1 °F (36.7 °C) (Temporal)   Resp 20   Ht 162.6 cm (64.02\")   Wt 86.2 kg (190 lb)   SpO2 100%   BMI 32.60 kg/m²   Estimated body mass index is 32.6 kg/m² as calculated from the following:    Height as of this encounter: 162.6 cm (64.02\").    Weight as of this encounter: 86.2 kg (190 lb).             Physical Exam  Vitals reviewed.   Constitutional:       Appearance: Normal appearance.   HENT:      Head: Normocephalic.      Nose: Nose normal.      Mouth/Throat:      Mouth: Mucous membranes are moist.   Eyes:      Extraocular Movements: Extraocular movements intact.   Cardiovascular:      Rate and Rhythm: Normal rate and regular rhythm.      Heart sounds: Normal heart sounds.   Pulmonary:      Effort: Pulmonary effort is normal.      Breath sounds: Normal breath sounds.   Musculoskeletal:         General: Normal range of motion.      Cervical back: Normal range of motion.   Skin:     General: Skin is warm and dry. "   Neurological:      General: No focal deficit present.      Mental Status: She is alert and oriented to person, place, and time.   Psychiatric:         Mood and Affect: Mood normal.         Behavior: Behavior normal.        Result Review :                   Assessment and Plan   Diagnoses and all orders for this visit:    1. Lumbar post-laminectomy syndrome (Primary)  -Discussed w/ pt that chronic pain will need to be managed by PM. Will contact [facility] to get their ok before sending temp norco refill. Ok to take capsacin and eye supplements (mostly lutein, vit E). Pt inquiring about prednisone rx. Will ok this sparingly, impressed importance of avoiding unnecessary steroids given osteoporosis    Other orders  -     sucralfate (CARAFATE) 1 g tablet; Take 1 tablet by mouth 4 (Four) Times a Day.  Dispense: 120 tablet; Refill: 10           I spent 40 minutes caring for Hanna on this date of service. This time includes time spent by me in the following activities:preparing for the visit, obtaining and/or reviewing a separately obtained history, performing a medically appropriate examination and/or evaluation , counseling and educating the patient/family/caregiver, ordering medications, tests, or procedures and documenting information in the medical record  EMR Dragon/Transcription disclaimer:   Much of this encounter note is an electronic transcription/translation of spoken language to printed text. The electronic translation of spoken language may permit erroneous, or at times, nonsensical words or phrases to be inadvertently transcribed; although attempts have made to review the note for such errors, some may still exist. Please excuse any unrecognized transcription errors and contact us if the air is unintelligible or needs documented correction. Also, portions of this note have been copied forward, however, changed to reflect the most current clinical status of this patient.  Follow Up   No follow-ups on file.  Patient  was given instructions and counseling regarding her condition or for health maintenance advice. Please see specific information pulled into the AVS if appropriate.

## 2023-04-14 DIAGNOSIS — M96.1 LUMBAR POST-LAMINECTOMY SYNDROME: ICD-10-CM

## 2023-04-14 RX ORDER — HYDROCODONE BITARTRATE AND ACETAMINOPHEN 7.5; 325 MG/1; MG/1
1 TABLET ORAL EVERY 8 HOURS PRN
Qty: 90 TABLET | Refills: 0 | Status: SHIPPED | OUTPATIENT
Start: 2023-04-14

## 2023-04-14 NOTE — TELEPHONE ENCOUNTER
Rx Refill Note  Requested Prescriptions      No prescriptions requested or ordered in this encounter      Last office visit with prescribing clinician: 4/13/2023   Last telemedicine visit with prescribing clinician: 6/12/2023   Next office visit with prescribing clinician: 6/14/2023       Called [facility] they are out of 7.5's script was cancelled by this MA.     Cat Thomas MA  04/14/23, 10:43 CDT

## 2023-04-14 NOTE — TELEPHONE ENCOUNTER
Please send to the [facility] in [place]. They have plenty!      Please advise patient asap when request has been sent to the pharmacy      Please leave a voicemail as patient is hearing impaired and only uses  voicemail.

## 2023-04-18 DIAGNOSIS — G25.81 RESTLESS LEG SYNDROME: ICD-10-CM

## 2023-04-19 RX ORDER — TEMAZEPAM 30 MG/1
30 CAPSULE ORAL NIGHTLY PRN
Qty: 30 CAPSULE | Refills: 0 | Status: SHIPPED | OUTPATIENT
Start: 2023-04-19

## 2023-04-19 NOTE — TELEPHONE ENCOUNTER
Rx Refill Note  Requested Prescriptions     Pending Prescriptions Disp Refills   • temazepam (RESTORIL) 30 MG capsule 30 capsule 0     Sig: Take 1 capsule by mouth At Night As Needed for Sleep.      Last office visit with prescribing clinician: 4/13/2023   Last telemedicine visit with prescribing clinician: 6/12/2023   Next office visit with prescribing clinician: 6/14/2023           Cat Thomas MA  04/19/23, 08:51 CDT

## 2023-04-24 RX ORDER — ALENDRONATE SODIUM 70 MG/1
TABLET ORAL
Qty: 12 TABLET | Refills: 3 | OUTPATIENT
Start: 2023-04-24

## 2023-04-25 DIAGNOSIS — K21.9 GASTROESOPHAGEAL REFLUX DISEASE, UNSPECIFIED WHETHER ESOPHAGITIS PRESENT: ICD-10-CM

## 2023-04-25 NOTE — TELEPHONE ENCOUNTER
Rx Refill Note  Requested Prescriptions     Pending Prescriptions Disp Refills   • alendronate (FOSAMAX) 70 MG tablet       Sig: Take 1 tablet by mouth Every 7 (Seven) Days.   • azelastine (ASTELIN) 0.1 % nasal spray       Si sprays 2 (Two) Times a Day. Use in each nostril as directed      Last office visit with prescribing clinician: 2023   Last telemedicine visit with prescribing clinician: 2023   Next office visit with prescribing clinician: 2023         Cat Thomas MA  23, 14:27 CDT

## 2023-04-26 RX ORDER — FAMOTIDINE 20 MG/1
20 TABLET, FILM COATED ORAL 2 TIMES DAILY
Qty: 180 TABLET | Refills: 0 | Status: SHIPPED | OUTPATIENT
Start: 2023-04-26

## 2023-04-26 RX ORDER — ALENDRONATE SODIUM 70 MG/1
70 TABLET ORAL
Qty: 12 TABLET | Refills: 2 | Status: SHIPPED | OUTPATIENT
Start: 2023-04-26

## 2023-04-26 RX ORDER — ROSUVASTATIN CALCIUM 20 MG/1
20 TABLET, COATED ORAL NIGHTLY
Qty: 90 TABLET | Refills: 0 | Status: SHIPPED | OUTPATIENT
Start: 2023-04-26

## 2023-04-26 RX ORDER — AZELASTINE 1 MG/ML
2 SPRAY, METERED NASAL 2 TIMES DAILY
Qty: 1 EACH | Refills: 5 | Status: SHIPPED | OUTPATIENT
Start: 2023-04-26

## 2023-04-28 RX ORDER — CALCIUM CARBONATE/VITAMIN D3 600 MG-10
TABLET ORAL
Qty: 180 TABLET | Refills: 2 | OUTPATIENT
Start: 2023-04-28

## 2023-05-04 ENCOUNTER — OFFICE VISIT (OUTPATIENT)
Dept: OBSTETRICS AND GYNECOLOGY | Facility: CLINIC | Age: 58
End: 2023-05-04
Payer: MEDICARE

## 2023-05-04 VITALS
BODY MASS INDEX: 32.27 KG/M2 | WEIGHT: 189 LBS | SYSTOLIC BLOOD PRESSURE: 122 MMHG | HEIGHT: 64 IN | DIASTOLIC BLOOD PRESSURE: 74 MMHG

## 2023-05-04 DIAGNOSIS — N81.4 UTERINE PROLAPSE: ICD-10-CM

## 2023-05-04 DIAGNOSIS — N81.6 CYSTOCELE WITH RECTOCELE: ICD-10-CM

## 2023-05-04 DIAGNOSIS — N95.2 ATROPHIC VAGINITIS: Primary | ICD-10-CM

## 2023-05-04 DIAGNOSIS — N81.10 CYSTOCELE WITH RECTOCELE: ICD-10-CM

## 2023-05-04 RX ORDER — ESTRADIOL 0.1 MG/G
1 CREAM VAGINAL 2 TIMES WEEKLY
Qty: 42.5 G | Refills: 12 | Status: SHIPPED | OUTPATIENT
Start: 2023-05-04

## 2023-05-04 NOTE — PROGRESS NOTES
"    Delfino Gaitan MD  [facility] [place] [facility]  [address]  [place] [address]  Office [phone]  Fax [phone]      [facility]  Hanna Luciano  : 1965  MRN: [id number]    Subjective   Subjective     Chief Complaint   Patient presents with   • Follow-up     Patient here for follow up on premarin cream for vaginal atrophy. Patient did not want to do the pessary last visit. Patient states doing well with the cream, does not want to continue with the pessary       History of Present Illness  Hanna Luciano is a 57 y.o. female , , who comes to the office today for follow-up. Notes the above. .      Review of Systems   Genitourinary: Negative for decreased urine volume, difficulty urinating, dyspareunia, dysuria, enuresis, flank pain, frequency, genital sores, hematuria, menstrual problem, pelvic pain, urgency, vaginal bleeding, vaginal discharge and vaginal pain.   All other systems reviewed and are negative.       The following portions of the patient's history were reviewed and updated as appropriate: allergies, current medications, past family history, past medical history, past social history, past surgical history and problem list.    Objective    Objective     Vitals:   Visit Vitals  /74   Ht 162.6 cm (64\")   Wt 85.7 kg (189 lb)   LMP  (LMP Unknown)   BMI 32.44 kg/m²        Physical Exam  Vitals reviewed.   Constitutional:       General: She is not in acute distress.     Appearance: Normal appearance. She is not ill-appearing.   HENT:      Head: Normocephalic and atraumatic.      Nose: No congestion or rhinorrhea.   Eyes:      General: No scleral icterus.        Right eye: No discharge.         Left eye: No discharge.      Extraocular Movements: Extraocular movements intact.      Conjunctiva/sclera: Conjunctivae normal.   Pulmonary:      Effort: Pulmonary effort is normal. No accessory muscle usage or respiratory distress.   Musculoskeletal:      Right lower leg: No edema.      Left " lower leg: No edema.   Skin:     General: Skin is warm and dry.      Coloration: Skin is not ashen, cyanotic or jaundiced.   Neurological:      General: No focal deficit present.      Mental Status: She is alert and oriented to person, place, and time.   Psychiatric:         Mood and Affect: Mood normal.         Behavior: Behavior is cooperative.           Assessment & Plan   Assessment / Plan     Diagnoses and all orders for this visit:    1. Atrophic vaginitis (Primary)  -     estradiol (ESTRACE VAGINAL) 0.1 MG/GM vaginal cream; Insert 1 g into the vagina 2 (Two) Times a Week.  Dispense: 42.5 g; Refill: 12    2. Cystocele with rectocele    3. Uterine prolapse        Discussion: Doing well with estrogen cream. Wants to continue this as this is helping her prolapse along with pelvic floor PT. Refills sent. RTC next year for annual. Discussed if need be, can reconsider pessary. She is agreeable to this if symptoms change/worsen.     Follow-up: Return in about 10 months (around 3/4/2024), or if symptoms worsen or fail to improve, for Annual physical.    Delfino Gaitan MD

## 2023-05-12 ENCOUNTER — TELEPHONE (OUTPATIENT)
Dept: FAMILY MEDICINE CLINIC | Facility: CLINIC | Age: 58
End: 2023-05-12

## 2023-05-12 DIAGNOSIS — M96.1 LUMBAR POST-LAMINECTOMY SYNDROME: Primary | ICD-10-CM

## 2023-05-12 RX ORDER — TRAMADOL HYDROCHLORIDE 50 MG/1
50 TABLET ORAL EVERY 6 HOURS PRN
Qty: 90 TABLET | Refills: 0 | Status: SHIPPED | OUTPATIENT
Start: 2023-05-12

## 2023-05-12 NOTE — TELEPHONE ENCOUNTER
Caller: Hanna Luciano    Relationship: Self    Best call back number: [phone]    What is the best time to reach you: ANY    Who are you requesting to speak with (clinical staff, provider,  specific staff member): CLINICAL    What was the call regarding:     PATIENT STATES SHE IS HAVING ISSUES GETTING HER HYDROCODONE REFILLED AT PHARMACY. PATIENT STATES ALL PHARMACIES SHE HAS CONTACTED ARE OUT OF STOCK OF MEDICATION. PATIENT IS WONDERING WHAT SHE SHOULD DO TO GET HER MEDICATION?    Do you require a callback: YES, PLEASE LEAVE A MESSAGE OR SEND A [employer] MESSAGE.

## 2023-05-15 ENCOUNTER — TELEPHONE (OUTPATIENT)
Dept: FAMILY MEDICINE CLINIC | Facility: CLINIC | Age: 58
End: 2023-05-15

## 2023-05-17 DIAGNOSIS — G25.81 RESTLESS LEG SYNDROME: ICD-10-CM

## 2023-05-17 RX ORDER — TEMAZEPAM 30 MG/1
30 CAPSULE ORAL NIGHTLY PRN
Qty: 30 CAPSULE | Refills: 0 | Status: SHIPPED | OUTPATIENT
Start: 2023-05-17

## 2023-05-17 NOTE — TELEPHONE ENCOUNTER
Rx Refill Note  Requested Prescriptions     Pending Prescriptions Disp Refills    temazepam (RESTORIL) 30 MG capsule 30 capsule 0     Sig: Take 1 capsule by mouth At Night As Needed for Sleep.      Last office visit with prescribing clinician: 4/13/2023   Last telemedicine visit with prescribing clinician: 5/12/2023   Next office visit with prescribing clinician: 6/14/2023    Patient does not have a UDS/CSA on file for medication. Patient will need to complete at next OV.     Leila Mcclure MA  05/17/23, 13:01 CDT

## 2023-05-24 DIAGNOSIS — K21.9 GASTROESOPHAGEAL REFLUX DISEASE: ICD-10-CM

## 2023-05-24 RX ORDER — LANSOPRAZOLE 30 MG/1
CAPSULE, DELAYED RELEASE ORAL
Qty: 30 CAPSULE | Refills: 3 | OUTPATIENT
Start: 2023-05-24

## 2023-05-26 ENCOUNTER — TELEPHONE (OUTPATIENT)
Dept: FAMILY MEDICINE CLINIC | Facility: CLINIC | Age: 58
End: 2023-05-26
Payer: MEDICARE

## 2023-06-05 ENCOUNTER — TELEPHONE (OUTPATIENT)
Dept: FAMILY MEDICINE CLINIC | Facility: CLINIC | Age: 58
End: 2023-06-05
Payer: MEDICARE

## 2023-06-05 DIAGNOSIS — K21.9 GASTROESOPHAGEAL REFLUX DISEASE: ICD-10-CM

## 2023-06-05 RX ORDER — LANSOPRAZOLE 30 MG/1
30 CAPSULE, DELAYED RELEASE ORAL DAILY
Qty: 90 CAPSULE | Refills: 3 | Status: SHIPPED | OUTPATIENT
Start: 2023-06-05

## 2023-06-05 NOTE — TELEPHONE ENCOUNTER
Rx Refill Note  Requested Prescriptions     Pending Prescriptions Disp Refills    lansoprazole (PREVACID) 30 MG capsule 90 capsule 3     Sig: Take 1 capsule by mouth Daily.      Last office visit with prescribing clinician: 4/13/2023   Last telemedicine visit with prescribing clinician: Visit date not found   Next office visit with prescribing clinician: 6/21/2023      Pt requested medication     Cat Thomas MA  06/05/23, 13:13 CDT

## 2023-06-21 ENCOUNTER — TELEPHONE (OUTPATIENT)
Dept: FAMILY MEDICINE CLINIC | Facility: CLINIC | Age: 58
End: 2023-06-21

## 2023-06-21 NOTE — TELEPHONE ENCOUNTER
Sent pt a mychart to let her know Dr. Lin will send the 15mg they spoke about that should be covered without a PA

## 2023-06-21 NOTE — TELEPHONE ENCOUNTER
Caller: Mustapha Hanna PARIKH    Relationship: Self    Best call back number: [phone]     What form or medical record are you requesting: CAME IN TODAY FOR A VISIT, PHARMACY SAID THE TEMAZEPAM NEEDS A P.A.  PATIENT IS WANTING MEDICATION REFILLED AS SOON AS POSSIBLE.  INSURANCE DENIED DUE TO THE STRENGTH/DOSAGE OF THE MEDICATION.      Who is requesting this form or medical record from you: PHARMACY    How would you like to receive the form or medical records (pick-up, mail, fax):   If fax, what is the fax number:   If mail, what is the address:   If pick-up, provide patient with address and location details    Timeframe paperwork needed:  AS SOON AS POSSIBLE    Additional notes: IF YOU CAN CALL PHARMACY ABOUT THIS AND THE PATIENT.  SHE DOES USE AN INTERPRETOR FOR SIGN LANGUAGE.

## 2023-07-18 ENCOUNTER — TELEPHONE (OUTPATIENT)
Dept: FAMILY MEDICINE CLINIC | Facility: CLINIC | Age: 58
End: 2023-07-18

## 2023-07-18 NOTE — TELEPHONE ENCOUNTER
Caller: Hanna Luciano    Relationship: Self    Best call back number: [phone]     What is the best time to reach you: ANYTIME    Who are you requesting to speak with (clinical staff, provider,  specific staff member): CLINICAL      What was the call regarding: PATIENT CALLED AND WANTS TO KNOW WHY THE TEMAZAPEM 22.5 WAS DENIED AND ALSO HAS SOME OTHER QUESTIONS    Is it okay if the provider responds through MyChart: NO BY PHONE

## 2023-08-07 NOTE — TELEPHONE ENCOUNTER
Caller: Hanna Luciano    Relationship: Self    Best call back number: [phone]     Requested Prescriptions:   Requested Prescriptions     Pending Prescriptions Disp Refills    rosuvastatin (CRESTOR) 20 MG tablet 90 tablet 0     Sig: Take 1 tablet by mouth Every Night.        Pharmacy where request should be sent:      Last office visit with prescribing clinician: 6/21/2023   Last telemedicine visit with prescribing clinician: Visit date not found   Next office visit with prescribing clinician: 9/21/2023     Additional details provided by patient: SHE USES INTERPRETOR SERVICES, SHE IS COMPLETLEY OUT OF MEDICATION     Does the patient have less than a 3 day supply:  [x] Yes  [] No    Would you like a call back once the refill request has been completed: [x] Yes [] No    If the office needs to give you a call back, can they leave a voicemail: [x] Yes [] No    Aguilar Swift Rep   08/07/23 11:29 CDT

## 2023-08-07 NOTE — TELEPHONE ENCOUNTER
Rx Refill Note  Requested Prescriptions     Pending Prescriptions Disp Refills    rosuvastatin (CRESTOR) 20 MG tablet 90 tablet 0     Sig: Take 1 tablet by mouth Every Night.        Dayana Goodrich MA  08/07/23, 10:35 CDT

## 2023-08-08 RX ORDER — ROSUVASTATIN CALCIUM 20 MG/1
20 TABLET, COATED ORAL NIGHTLY
Qty: 90 TABLET | Refills: 1 | Status: SHIPPED | OUTPATIENT
Start: 2023-08-08

## 2023-08-08 NOTE — TELEPHONE ENCOUNTER
Rx Refill Note  Requested Prescriptions     Pending Prescriptions Disp Refills    rosuvastatin (CRESTOR) 20 MG tablet 90 tablet 1     Sig: Take 1 tablet by mouth Every Night.      Last office visit with prescribing clinician: 6/21/2023   Last telemedicine visit with prescribing clinician: Visit date not found   Next office visit with prescribing clinician: 9/21/2023   Protocol met        Cat Thomas MA  08/08/23, 09:11 CDT

## 2023-09-14 ENCOUNTER — TELEPHONE (OUTPATIENT)
Dept: FAMILY MEDICINE CLINIC | Facility: CLINIC | Age: 58
End: 2023-09-14

## 2023-09-14 NOTE — TELEPHONE ENCOUNTER
Pt called in and spoke to Dulce and she asked me to speak to the patient.  She has inquired about what to do going forward with pain mgt.  Dulce was with me as I took the call.  I explained the [facility] should be bridging medication for the month of October.  I asked her to call them she explained she called and no one answered and called and then spoke to a leo that simply told her the pain mgt shut down.  I did confirm for her they shut down on the 5th of September.  She states she is in panic mode. I explained she will have to get the release and sign it so she can get a referral for another pain management.  She states she has no transportation to do this.  I let her know they are supposed to be mailing the letters.  She said first she was told they couldn't mail it and then she states they would email it and she doesn't trust it.  I again told her she will have to get this signed, Dr. Lin may bridge if he finds it necessary but wont continue the medication long term so this needs to be done as soon as possible.  I made her aware he knows and understands what's going on with pain management and we will help get her somewhere new but she must must find a way to sign this form.  I gave her the managers name of paulo and told her to call her.  She asked if they could fax it to us so she could sign it here next week I let her know that's fine with me but it is the [facility] decision.  If they are willing to do that it would be fine.

## 2023-09-14 NOTE — TELEPHONE ENCOUNTER
Caller: Luciano Hanna KATI    Relationship: Self    Best call back number: [phone]     What is the best time to reach you: SOON PLEASE     Who are you requesting to speak with (clinical staff, provider,  specific staff member): PROVIDER OR CLINICAL STAFF       What was the call regarding: PATIENT REQUESTING A CALL BACK TO DISCUSS WHAT SHE IS TO DO ABOUT HER PAIN MEDICATION  NOW THAT HER MANAGEMENT CENTER HAS SHUT DOWN  SHE HAS FOUND ANOTHER FACILITY BUT CANNOT GET AN APPOINTMENT OR REFERRAL WITH A RELEASE FROM HER PREVIOUS PAIN MANAGEMENT    Is it okay if the provider responds through MyChart: PREFERS A CALL BACK

## 2023-09-15 ENCOUNTER — TELEPHONE (OUTPATIENT)
Dept: FAMILY MEDICINE CLINIC | Facility: CLINIC | Age: 58
End: 2023-09-15
Payer: MEDICARE

## 2023-09-18 NOTE — TELEPHONE ENCOUNTER
Pt called on 09-14-23 about pain management ending at the [facility], I sent her a MyChart and she called shortly after.  She states she is in panic mode.  This call was passed to me from JOHN Higgins and was with me while taking the call.  I explained the doors had closed as of September 5th but she has been seen in the last 3 months so one month of pain medication should be supplied by them, they would be bridging anyone that has been seen by them in the last 3 months for one month.  I let her know they should mail her something to have the release sent to her so she could get a referral to be seen at another pain management .  She states she spoke to roxane bailey and they can't mail it and spoke about them emailing but she didn't trust this.  She didn't know how she was going to be able to get this.  I stressed this would have to be done.  Dr. Lin doesn't write long term controlled's.  She asked if they ([facility]) could send the form to our office and she could sign it here, I let her know I have no problem with that but that would be their decision.  I gave her the name of the manager at the [facility] so she could contact her to try to work this out.

## 2023-09-21 ENCOUNTER — TELEPHONE (OUTPATIENT)
Dept: FAMILY MEDICINE CLINIC | Facility: CLINIC | Age: 58
End: 2023-09-21

## 2023-09-21 NOTE — TELEPHONE ENCOUNTER
Called patient after interpreters ipad wasn't working today. Our Ipad had dropped the application off, we worked trying to get this fixed but after speaking with IT our office will be needing to get another IPAD to operate this Application. I apologized first thing and let her know I wanted to offer another appointment that I had spoken to our manager and we would make sure we had the interpretation device here in order to do the visit.  She took oct 6th at 1:30 she asked for our mangers name and phone number that also was given.  She had wanted to write back and forth during the visit but we told her we couldn't do this we needed to have the  for communication by law.  She informed me this isn't true.  She wanted to discuss this with our manager she states she feels discriminated against, I let her know she shouldn't feel this way, it was simply the technology and I apologized once again.  This incident has nothing to do with her having a disability. She asked about a co-pay I told her we could find out she states she will have to pay 30% and wants to know what that is, however she wasn't required to pay a co-pay and I let her know I would have to find out what a normal office visit would cost. I did inquire about medication she needed filled, however she said she called the pharmacy and still has a refill and will be fine until she see's doctor faisal again.  I let her know if she needed anything else to let us know, and told her she could reach out to STEPHANIE Smith.

## 2023-09-22 ENCOUNTER — TELEPHONE (OUTPATIENT)
Dept: FAMILY MEDICINE CLINIC | Facility: CLINIC | Age: 58
End: 2023-09-22
Payer: MEDICARE

## 2023-09-22 NOTE — TELEPHONE ENCOUNTER
Spoke with patient, she expressed her feelings from our technical issues on 9/21/23 at her scheduled appointment. I apologized on our behalf and assured patient we have placed new procedures to ensure it will not happen again. Patient voiced understanding.

## 2023-09-22 NOTE — TELEPHONE ENCOUNTER
Hanna Luciano and he  called today and she stated that she came to [place] yesterday to see Dr. Lin and there were some technical issues that prevented her from seeing him- her appointment was rescheduled and she would like to discuss with manager about the incident so they can be proactive for the next visit as she found this to be unacceptable.

## 2023-09-25 ENCOUNTER — TELEPHONE (OUTPATIENT)
Dept: FAMILY MEDICINE CLINIC | Facility: CLINIC | Age: 58
End: 2023-09-25

## 2023-09-25 NOTE — TELEPHONE ENCOUNTER
Pt called into the office, via ,  today wanting to reiterate to the office that she is very upset with not being able to be seen the other day,pt stated she is now aware that [facility] is out of network with her insurance and doesn't want to go a month without her medication if she can not find another doctor, I let the pt know that she is still able to be seen in our office she will just be billed differently by her insurance since they have listed [facility] out of network and that we are sorry for the inconvenience with the technological issue, pt continued to explain she feels like she should have been able to communicate for her appointment via pen and paper and that is her right and is against the law to not let her do so, pt stated the office is uneducated and stupid and that she already spoke to our manager Ameya about the  application not working but what if it is not fixed by her appointment on Oct 6. I relayed to the pt that this was still being taken care of and that we will have it taken care of before she arrives for her appointment on Oct 6. Pt also stated she believes Ameya does not know what she is doing. Pt believes she was forced to reschedule her appointment and she believes she was discriminated against and she feels like she should speak to a . I relayed to pt that we were are sorry again for the inconvenience and that were told that we were unable to communicate via pen and paper for her appointment and we were required to use the application that is not working for her appointment and that I would relay her further frustration to Ameya and I asked what I could help with at this moment in the phone call. Pt stated she needed her medication for gerd, I relayed to pt we can try to take care of refilling the two medications for gerd since she is completley out instead of waiting until next week for her appointment, pt stated no she was  not completely out of these medication that she would just wait until her appointment that she just wanted to communicate and educate on her frustration and that I did not know what I was talking about anyway.

## 2023-10-03 ENCOUNTER — TELEPHONE (OUTPATIENT)
Dept: FAMILY MEDICINE CLINIC | Facility: CLINIC | Age: 58
End: 2023-10-03

## 2023-10-03 NOTE — TELEPHONE ENCOUNTER
Caller: Hanna Luciano    Relationship: Self    Best call back number:     [phone] (Home), OK TO LEAVE VOICEMAIL     What form or medical record are you requesting: LUMBAR SPINE CT WITH OR WITHOUT CONTRAST FROM 8/23/2021 COMPARED WITH THE TEST FROM 07/15/2021 AND THE MRI OF THE LUMBAR SPINE WITH CONTRAST FROM THE DATE OF 4/16/20/21.     Who is requesting this form or medical record from you: [facility]    How would you like to receive the form or medical records (pick-up, mail, fax):  FAX  If fax, what is the fax number:  FAX: [phone]   PHONE:[phone]    Timeframe paperwork needed: ASAP

## 2023-10-06 ENCOUNTER — OFFICE VISIT (OUTPATIENT)
Dept: FAMILY MEDICINE CLINIC | Facility: CLINIC | Age: 58
End: 2023-10-06
Payer: MEDICARE

## 2023-10-06 VITALS
DIASTOLIC BLOOD PRESSURE: 74 MMHG | BODY MASS INDEX: 32.78 KG/M2 | HEART RATE: 79 BPM | SYSTOLIC BLOOD PRESSURE: 124 MMHG | RESPIRATION RATE: 20 BRPM | HEIGHT: 64 IN | OXYGEN SATURATION: 98 % | TEMPERATURE: 96.3 F | WEIGHT: 192 LBS

## 2023-10-06 DIAGNOSIS — M96.1 LUMBAR POST-LAMINECTOMY SYNDROME: ICD-10-CM

## 2023-10-06 DIAGNOSIS — M51.37 DEGENERATION OF LUMBOSACRAL INTERVERTEBRAL DISC: ICD-10-CM

## 2023-10-06 DIAGNOSIS — G25.81 RESTLESS LEG SYNDROME: ICD-10-CM

## 2023-10-06 DIAGNOSIS — F32.A DEPRESSION, UNSPECIFIED DEPRESSION TYPE: ICD-10-CM

## 2023-10-06 PROCEDURE — 99214 OFFICE O/P EST MOD 30 MIN: CPT | Performed by: STUDENT IN AN ORGANIZED HEALTH CARE EDUCATION/TRAINING PROGRAM

## 2023-10-06 RX ORDER — BUPROPION HYDROCHLORIDE 300 MG/1
300 TABLET ORAL DAILY
Qty: 90 TABLET | Refills: 0 | Status: SHIPPED | OUTPATIENT
Start: 2023-10-06

## 2023-10-06 RX ORDER — METHOCARBAMOL 750 MG/1
750 TABLET, FILM COATED ORAL 3 TIMES DAILY PRN
Qty: 90 TABLET | Refills: 5 | Status: SHIPPED | OUTPATIENT
Start: 2023-10-06

## 2023-10-06 RX ORDER — TEMAZEPAM 15 MG/1
15 CAPSULE ORAL NIGHTLY PRN
Qty: 90 CAPSULE | Refills: 0 | Status: SHIPPED | OUTPATIENT
Start: 2023-10-06

## 2023-10-06 RX ORDER — HYDROCODONE BITARTRATE AND ACETAMINOPHEN 7.5; 325 MG/1; MG/1
1 TABLET ORAL EVERY 8 HOURS PRN
Qty: 90 TABLET | Refills: 0 | Status: SHIPPED | OUTPATIENT
Start: 2023-10-06

## 2023-10-06 RX ORDER — CELECOXIB 200 MG/1
200 CAPSULE ORAL DAILY
Qty: 30 CAPSULE | Refills: 3 | Status: SHIPPED | OUTPATIENT
Start: 2023-10-06

## 2023-10-06 RX ORDER — HYDROCODONE BITARTRATE AND ACETAMINOPHEN 7.5; 325 MG/1; MG/1
1 TABLET ORAL EVERY 8 HOURS PRN
Qty: 90 TABLET | Refills: 0 | Status: CANCELLED | OUTPATIENT
Start: 2023-10-06

## 2023-10-06 NOTE — PROGRESS NOTES
Chief Complaint  pain in joints (Painful to walk pain in knees and pain in hands these are new. /) and medication refills    Subjective        Hanna Luciano presents to [facility]    HPI    FU  ( Delfino used to facilitate communication)    Needs refill of temazepam for RLS. 15mg is working well for her  Pt has chronic pain mostly from lower back and previous surgery. She feels some worsening in sx to feet. Hydrocodone and robaxin help. Unfortunately her pain clinic at  is closing and will be running into issues w/ bridging of prescription until she sees new pain medicine clinic which she has already been referred to, pain [facility]. She notes stiffness in toes and hands.    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    Hyperlipidemia     Low back pain 2003    Major Back surgery    Neuromuscular disorder Aug 2022    Shooting pain - Sciatic Nerve    Osteopenia     Tuberculosis 1998    Not active    Urinary tract infection 2021     Past Surgical History:   Procedure Laterality Date    APPENDECTOMY      BACK SURGERY  2003    CHOLECYSTECTOMY      COLONOSCOPY N/A 04/28/2021    Procedure: COLONOSCOPY WITH ANESTHESIA;  Surgeon: Sandra Nguyen MD;  Location: [facility];  Service: Gastroenterology;  Laterality: N/A;  preop; epogastric pain  postop; polyps   PCP Andres Martinez     ENDOSCOPY N/A 04/28/2021    Procedure: ESOPHAGOGASTRODUODENOSCOPY WITH ANESTHESIA;  Surgeon: Sandra Nguyen MD;  Location: [facility];  Service: Gastroenterology;   "Laterality: N/A;  preop; dysphagia  postop; normal  PCP Kirti Martinez     EYE SURGERY Left 1965    left eyebrow    OOPHORECTOMY Left 2000    SPINE SURGERY      TUBAL ABDOMINAL LIGATION  1994    I’m done being pregnant     Social History     Socioeconomic History    Marital status:    Tobacco Use    Smoking status: Former     Types: Electronic Cigarette     Passive exposure: Never    Smokeless tobacco: Never    Tobacco comments:     Cigarettes   Vaping Use    Vaping Use: Never used   Substance and Sexual Activity    Alcohol use: Not Currently    Drug use: Not Currently     Types: Hydrocodone     Comment: I don’t take Tylenol 3 now.    Sexual activity: Not Currently     Partners: Male     Birth control/protection: Post-menopausal, None, Tubal ligation       Objective   Vital Signs:  /74   Pulse 79   Temp 96.3 °F (35.7 °C) (Temporal)   Resp 20   Ht 162.6 cm (64.02\")   Wt 87.1 kg (192 lb)   SpO2 98%   BMI 32.94 kg/m²   Estimated body mass index is 32.94 kg/m² as calculated from the following:    Height as of this encounter: 162.6 cm (64.02\").    Weight as of this encounter: 87.1 kg (192 lb).               Physical Exam  Vitals reviewed.   Constitutional:       Appearance: Normal appearance.   HENT:      Head: Normocephalic.      Nose: Nose normal.      Mouth/Throat:      Mouth: Mucous membranes are moist.   Eyes:      Extraocular Movements: Extraocular movements intact.   Cardiovascular:      Rate and Rhythm: Normal rate and regular rhythm.      Heart sounds: Normal heart sounds.   Pulmonary:      Effort: Pulmonary effort is normal.      Breath sounds: Normal breath sounds.   Musculoskeletal:         General: Normal range of motion.      Cervical back: Normal range of motion.   Skin:     General: Skin is warm and dry.   Neurological:      General: No focal deficit present.      Mental Status: She is alert and oriented to person, place, and time.   Psychiatric:         Mood and Affect: Mood normal.    "      Behavior: Behavior normal.      Result Review :                   Assessment and Plan   Diagnoses and all orders for this visit:    1. Restless leg syndrome  -VASYL was reviewed today per office protocol. Report shows No discrepancies.  Fill pattern is consistent from single provider(s) at single pharmacy(s).     Presciption Escribed  -     temazepam (RESTORIL) 15 MG capsule; Take 1 capsule by mouth At Night As Needed for Sleep.  Dispense: 90 capsule; Refill: 0    2. Lumbar post-laminectomy syndrome  -In addition to hydrocodone and robaxin, recommend celebrex to help w/ OA sx. Will contact [facility] re: hydrocodone bridge.  -     celecoxib (CeleBREX) 200 MG capsule; Take 1 capsule by mouth Daily.  Dispense: 30 capsule; Refill: 3    3. Degeneration of lumbosacral intervertebral disc  -     methocarbamol (ROBAXIN) 750 MG tablet; Take 1 tablet by mouth 3 (Three) Times a Day As Needed for Muscle Spasms.  Dispense: 90 tablet; Refill: 5  -     celecoxib (CeleBREX) 200 MG capsule; Take 1 capsule by mouth Daily.  Dispense: 30 capsule; Refill: 3    4. Depression, unspecified depression type  -     buPROPion XL (WELLBUTRIN XL) 300 MG 24 hr tablet; Take 1 tablet by mouth Daily.  Dispense: 90 tablet; Refill: 0    Discussed w/ pt her [employer] medicare replacement and lack of in-network coverage w/ [facility]. I suggested talking w/ insurance about out of network cost and affordability. I am happy to continue seeing her if she is able to come here. FU 3 mo or sooner if needed.         EMR Dragon/Transcription disclaimer:   Much of this encounter note is an electronic transcription/translation of spoken language to printed text. The electronic translation of spoken language may permit erroneous, or at times, nonsensical words or phrases to be inadvertently transcribed; although attempts have made to review the note for such errors, some may still exist. Please excuse any unrecognized transcription errors and contact us if the air is  unintelligible or needs documented correction. Also, portions of this note have been copied forward, however, changed to reflect the most current clinical status of this patient.  Follow Up   No follow-ups on file.  Patient was given instructions and counseling regarding her condition or for health maintenance advice. Please see specific information pulled into the AVS if appropriate.

## 2023-10-11 ENCOUNTER — TELEPHONE (OUTPATIENT)
Dept: FAMILY MEDICINE CLINIC | Facility: CLINIC | Age: 58
End: 2023-10-11

## 2023-10-11 NOTE — TELEPHONE ENCOUNTER
Caller: Mustapha Hanna KATI    Relationship: Self    Best call back number: [phone]    Who are you requesting to speak with (clinical staff, provider,  specific staff member): CLINICAL STAFF    Do you know the name of the person who called: PATIENT    What was the call regarding: PATIENT HAD FLU SHOT IN SEPTEMBER, CAN SHE GET COVID BOOSTER AND PNEUMONIA VACCINE ALSO?    Is it okay if the provider responds through MyChart: YES

## 2023-10-16 ENCOUNTER — TELEPHONE (OUTPATIENT)
Dept: FAMILY MEDICINE CLINIC | Facility: CLINIC | Age: 58
End: 2023-10-16

## 2023-10-16 NOTE — TELEPHONE ENCOUNTER
I have contacted Dr. Lin to consult on this and STEPHANIE Smith we maybe able to do a ph visit I have requested the records from [facility] to see what was done there once I have recvd these Dr Lin will let me know

## 2023-10-16 NOTE — TELEPHONE ENCOUNTER
Caller: Hanna Luciano    Relationship to patient: Self    Best call back number: [phone]     Patient is needing: A HOSPITAL FOLLOW UP, WAS SEEN IN ER LAST WEEK FOR CONGESTION,SHORTNESS OF BREATH, COUGH. WAS GIVEN ANTIBIOTICS.  PATIENT STATES SHE DOES NOT HAVE TRANSPORTATION FOR 2 WEEKS DUE TO TRANSPORTATION SERVICE BEING BOOKED UP. WOULD LIKE TO DO VIDEO VISIT BUT DOESN'T KNOW HOW WE WOULD GET THE SIGN LANGUAGE INTERPRETERS ON VIDEO VISIT. SHE STATES SHE IS STILL HAVING SOME SYMPTOMS AND HER ANTIBIOTICS HAVE RAN OUT

## 2023-10-18 ENCOUNTER — TELEPHONE (OUTPATIENT)
Dept: FAMILY MEDICINE CLINIC | Facility: CLINIC | Age: 58
End: 2023-10-18

## 2023-10-18 NOTE — TELEPHONE ENCOUNTER
Caller: Hanna Luciano    Relationship: Self    Best call back number: [phone]     What is the medical concern/diagnosis: PAIN     What specialty or service is being requested: PAIN MANAGEMENT    What is the provider, practice or medical service name: [facility]    What is the office location: [place]    What is the office phone number: FAX: [phone] PHONE: [phone]    Any additional details: PATIENT CALLED THE OTHER PAIN MANAGEMENT AND THEY TOLD HER THERE WAS NOTHING TO OFFER HER. SHE IS NEEDING A REFERRAL SENT TO [facility]. SHE DOES HAVE A COPY OF HER CT AND SCANS ON [facility] SO SHE WOULDN'T NEED ANY OF THAT JUST THE REFERRAL. SHE DOES NOT WANT TO GO TO [facility] SHE HAD A BAD EXPERIENCE WITH THEM. PLEASE SEND TO [facility] OR SOMEWHERE ELSE.     PLEASE CALL BACK WHEN THIS IS COMPLETED AND WHERE IT WAS SENT.

## 2023-10-19 DIAGNOSIS — G89.29 CHRONIC BACK PAIN, UNSPECIFIED BACK LOCATION, UNSPECIFIED BACK PAIN LATERALITY: Primary | ICD-10-CM

## 2023-10-19 DIAGNOSIS — M54.9 CHRONIC BACK PAIN, UNSPECIFIED BACK LOCATION, UNSPECIFIED BACK PAIN LATERALITY: Primary | ICD-10-CM

## 2023-10-20 ENCOUNTER — TELEPHONE (OUTPATIENT)
Dept: FAMILY MEDICINE CLINIC | Facility: CLINIC | Age: 58
End: 2023-10-20
Payer: MEDICARE

## 2023-10-20 DIAGNOSIS — G25.81 RESTLESS LEG SYNDROME: ICD-10-CM

## 2023-10-20 RX ORDER — TEMAZEPAM 15 MG/1
15 CAPSULE ORAL NIGHTLY PRN
Qty: 90 CAPSULE | Refills: 0 | Status: SHIPPED | OUTPATIENT
Start: 2023-10-20

## 2023-10-20 NOTE — TELEPHONE ENCOUNTER
Pharmacy called and wanted to know if pt script could be sent in today, pt will be at pharmacy today to receive covid vaccination and would like to  while she is there, pharmacy is unable to delivery tomorrow. Was signed and sent 10/6 pharmacy put on hold due to being early.

## 2023-10-20 NOTE — TELEPHONE ENCOUNTER
"Pt had sent request for a video visit with Dr. Lin after her visit to the ED.  She originally called and wanted to see Dr. Lin for sick sx however she stated she was SOB and Dr. Lin directed her to go to the ED due to her having to schedule transportation, she had stated this takes reservations 2 weeks in advance in order to do this and he wouldn't be able to listen and evaluate her on the phone.  She had left a message with our , Dr. Lin asked me to get the ED visit records which I did he did agree to do a visit through video due to her transportation issues at this point, I told her we planned on calling so we could use her interrupter and she refused, I consulted Bridget and we told her we would do a video through [email or web address] and still call so they could communicate and she stated \"NO, NO , NO that's not how its done there is software we are supposed to have according to Kenia Clements at the commission without this it is discriminatory\"  She states \"its something Im learning about as well\" She told me we would have to call her and get the information on how to do this.  \"This is something every clinic must have\" She states, \"just cancel my appointment until you get this taken care of.\"  This cancel was on 10-17-23  "

## 2023-10-20 NOTE — TELEPHONE ENCOUNTER
Rx Refill Note  Requested Prescriptions     Pending Prescriptions Disp Refills    temazepam (RESTORIL) 15 MG capsule 90 capsule 0     Sig: Take 1 capsule by mouth At Night As Needed for Sleep.      Last office visit with prescribing clinician: 10/6/2023   Last telemedicine visit with prescribing clinician: Visit date not found   Next office visit with prescribing clinician: 1/5/2024           Cat Thomas MA  10/20/23, 13:34 CDT

## 2023-10-25 ENCOUNTER — TELEMEDICINE (OUTPATIENT)
Dept: FAMILY MEDICINE CLINIC | Facility: CLINIC | Age: 58
End: 2023-10-25
Payer: MEDICARE

## 2023-10-25 DIAGNOSIS — J30.89 NON-SEASONAL ALLERGIC RHINITIS DUE TO OTHER ALLERGIC TRIGGER: Primary | ICD-10-CM

## 2023-10-25 RX ORDER — FLUTICASONE PROPIONATE 50 MCG
1 SPRAY, SUSPENSION (ML) NASAL DAILY
Qty: 16 G | Refills: 0 | Status: SHIPPED | OUTPATIENT
Start: 2023-10-25

## 2023-10-25 NOTE — PROGRESS NOTES
Pt located at her home. Provider located at [facility] clinic  Patient presents for video appointment.   You have chosen to receive care through a telehealth visit.  Do you consent to use a video/audio connection for your medical care today? Yes     Chief Complaint  No chief complaint on file.    Subjective        Hanna Luciano presents to [facility]    HPI     ID #[id number] [employer] video relay [employer]    Went to ER for upper respiratory symptoms 2 weeks ago  Says was told likely had virus, given 5 day course of azithromycin and otc mucus relief. Sx improved a little however have continued nonetheless  Now having mucus in throat, fatigue. Is sneezing and mild cough  Says neighbors smoke things near her which aggravates sx  Temp 99  Trying to drink plenty of fluids.        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    Hyperlipidemia     Low back pain 2003    Major Back surgery    Neuromuscular disorder Aug 2022    Shooting pain - Sciatic Nerve    Osteopenia     Tuberculosis 1998    Not active    Urinary tract infection 2021     Past Surgical History:   Procedure Laterality Date    APPENDECTOMY      BACK SURGERY  2003    CHOLECYSTECTOMY      COLONOSCOPY N/A 04/28/2021    Procedure: COLONOSCOPY WITH ANESTHESIA;  Surgeon: Sandra Nguyen MD;  Location: [facility] ENDOSCOPY;  Service: Gastroenterology;  Laterality: N/A;  preop; epogastric pain  postop; polyps   PCP Andres Martinez     ENDOSCOPY N/A 04/28/2021    Procedure: ESOPHAGOGASTRODUODENOSCOPY WITH ANESTHESIA;  Surgeon: Patrick  "Sandra HERNANDEZ MD;  Location: [facility] ENDOSCOPY;  Service: Gastroenterology;  Laterality: N/A;  preop; dysphagia  postop; normal  PCP Kirti Juan     EYE SURGERY Left 1965    left eyebrow    OOPHORECTOMY Left 2000    SPINE SURGERY      TUBAL ABDOMINAL LIGATION  1994    I’m done being pregnant     Social History     Socioeconomic History    Marital status:    Tobacco Use    Smoking status: Former     Types: Electronic Cigarette     Passive exposure: Never    Smokeless tobacco: Never    Tobacco comments:     Cigarettes   Vaping Use    Vaping Use: Never used   Substance and Sexual Activity    Alcohol use: Not Currently    Drug use: Not Currently     Types: Hydrocodone     Comment: I don’t take Tylenol 3 now.    Sexual activity: Not Currently     Partners: Male     Birth control/protection: Post-menopausal, None, Tubal ligation       Objective   Vital Signs:  There were no vitals taken for this visit.  Estimated body mass index is 32.94 kg/m² as calculated from the following:    Height as of 10/6/23: 162.6 cm (64.02\").    Weight as of 10/6/23: 87.1 kg (192 lb).               Physical Exam  Vitals reviewed.   Constitutional:       Appearance: Normal appearance.   HENT:      Head: Normocephalic.      Nose: Nose normal.      Mouth/Throat:      Mouth: Mucous membranes are moist.   Eyes:      Extraocular Movements: Extraocular movements intact.   Cardiovascular:      Rate and Rhythm: Normal rate and regular rhythm.      Heart sounds: Normal heart sounds.   Pulmonary:      Effort: Pulmonary effort is normal.      Breath sounds: Normal breath sounds.   Musculoskeletal:         General: Normal range of motion.      Cervical back: Normal range of motion.   Skin:     General: Skin is warm and dry.   Neurological:      General: No focal deficit present.      Mental Status: She is alert and oriented to person, place, and time.   Psychiatric:         Mood and Affect: Mood normal.         Behavior: Behavior normal.        Result " Review :                   Assessment and Plan   Diagnoses and all orders for this visit:    1. Non-seasonal allergic rhinitis due to other allergic trigger (Primary)  -Sx do not appear infectious. Recommend addressing sinus/allergies origin w/ flonase. RTC parameters given.  -     fluticasone (FLONASE) 50 MCG/ACT nasal spray; 1 spray into the nostril(s) as directed by provider Daily.  Dispense: 16 g; Refill: 0             Follow Up   No follow-ups on file.  Patient was given instructions and counseling regarding her condition or for health maintenance advice. Please see specific information pulled into the AVS if appropriate.

## 2023-11-03 DIAGNOSIS — K21.9 GASTROESOPHAGEAL REFLUX DISEASE, UNSPECIFIED WHETHER ESOPHAGITIS PRESENT: ICD-10-CM

## 2023-11-03 RX ORDER — FAMOTIDINE 20 MG/1
20 TABLET, FILM COATED ORAL 2 TIMES DAILY
Qty: 180 TABLET | Refills: 0 | Status: SHIPPED | OUTPATIENT
Start: 2023-11-03

## 2023-11-03 NOTE — TELEPHONE ENCOUNTER
Rx Refill Note  Requested Prescriptions     Pending Prescriptions Disp Refills    famotidine (PEPCID) 20 MG tablet 180 tablet 0     Sig: Take 1 tablet by mouth 2 (Two) Times a Day.     Dayana Goodrich MA  11/03/23, 13:17 CDT

## 2023-11-03 NOTE — TELEPHONE ENCOUNTER
Rx Refill Note  Requested Prescriptions     Pending Prescriptions Disp Refills    famotidine (PEPCID) 20 MG tablet 180 tablet 0     Sig: Take 1 tablet by mouth 2 (Two) Times a Day.      Last office visit with prescribing clinician: 10/6/2023   Last telemedicine visit with prescribing clinician: 10/25/2023   Next office visit with prescribing clinician: 1/5/2024             Cat Thomas MA  11/03/23, 15:36 CDT

## 2023-11-06 DIAGNOSIS — M51.37 DEGENERATION OF LUMBOSACRAL INTERVERTEBRAL DISC: ICD-10-CM

## 2023-11-06 RX ORDER — CALCIUM CARBONATE/VITAMIN D3 600 MG-10
1 TABLET ORAL 2 TIMES DAILY WITH MEALS
Qty: 60 TABLET | Refills: 6 | Status: SHIPPED | OUTPATIENT
Start: 2023-11-06

## 2023-12-07 NOTE — TELEPHONE ENCOUNTER
Caller: Hanna Luciano    Relationship to patient: Self    Best call back number: [phone]    Patient is needing to speak with someone about her chronic low back pain. She states she usually gets a steroid injection, but she does not see her provider until May. She has an RX for Tylenol 3s, which she takes along with regular tylenol for this pain. She states this was not what the medication was initiallty prescribed She is wondering what she can do for relief ? Please advise.    No

## 2023-12-22 ENCOUNTER — TELEPHONE (OUTPATIENT)
Dept: FAMILY MEDICINE CLINIC | Facility: CLINIC | Age: 58
End: 2023-12-22
Payer: MEDICARE

## 2023-12-22 DIAGNOSIS — F32.A DEPRESSION, UNSPECIFIED DEPRESSION TYPE: ICD-10-CM

## 2023-12-22 DIAGNOSIS — J30.89 NON-SEASONAL ALLERGIC RHINITIS DUE TO OTHER ALLERGIC TRIGGER: ICD-10-CM

## 2023-12-22 RX ORDER — FLUTICASONE PROPIONATE 50 MCG
1 SPRAY, SUSPENSION (ML) NASAL DAILY
Qty: 16 G | Refills: 0 | Status: SHIPPED | OUTPATIENT
Start: 2023-12-22

## 2023-12-22 RX ORDER — BUPROPION HYDROCHLORIDE 300 MG/1
300 TABLET ORAL DAILY
Qty: 90 TABLET | Refills: 0 | Status: SHIPPED | OUTPATIENT
Start: 2023-12-22

## 2023-12-22 NOTE — TELEPHONE ENCOUNTER
Caller: Hanna Luciano    Relationship: Self    Best call back number: [phone]     Requested Prescriptions:   Requested Prescriptions     Pending Prescriptions Disp Refills    fluticasone (FLONASE) 50 MCG/ACT nasal spray 16 g 0     Si spray into the nostril(s) as directed by provider Daily.    buPROPion XL (WELLBUTRIN XL) 300 MG 24 hr tablet 90 tablet 0     Sig: Take 1 tablet by mouth Daily.        Pharmacy where request should be sent: [address] [phone] [facility] [phone]      Last office visit with prescribing clinician: 10/6/2023   Last telemedicine visit with prescribing clinician: 10/25/2023   Next office visit with prescribing clinician: Visit date not found     Additional details provided by patient:     Does the patient have less than a 3 day supply:  [x] Yes  [] No        Virgilio Amaral III, Aguilar Rep   23 09:06 CST

## 2023-12-22 NOTE — TELEPHONE ENCOUNTER
Rx Refill Note  Requested Prescriptions     Pending Prescriptions Disp Refills    fluticasone (FLONASE) 50 MCG/ACT nasal spray 16 g 0     Si spray into the nostril(s) as directed by provider Daily.    buPROPion XL (WELLBUTRIN XL) 300 MG 24 hr tablet 90 tablet 0     Sig: Take 1 tablet by mouth Daily.      Last office visit with prescribing clinician: 10/6/2023         Cheryl Mendez MA  23, 09:18 CST

## 2024-01-04 ENCOUNTER — TELEPHONE (OUTPATIENT)
Dept: FAMILY MEDICINE CLINIC | Facility: CLINIC | Age: 59
End: 2024-01-04
Payer: MEDICARE

## 2024-01-04 NOTE — TELEPHONE ENCOUNTER
Name: Hanna Luciano    Relationship: Self    Best Callback Number: [phone]    HUB PROVIDED THE RELAY MESSAGE FROM THE OFFICE   PATIENT VOICED UNDERSTANDING AND HAS NO FURTHER QUESTIONS AT THIS TIME    ADDITIONAL INFORMATION: PATIENT CANCELLED APPOINTMENT DUE TO COVERAGE ISSUES.

## 2024-01-04 NOTE — TELEPHONE ENCOUNTER
Attempted to reach pt in regards to her korin scheduled for tomorrow with Isa Vora, pt insurance approval letter has  as of 23 and was only authorized for pt to see , pt will need to contact her insurance in regards to her coverage eligibility due to [facility] being out of network with her insurance. Otherwise pt could be reliable for paying out of pocket cost for her visit due to our offices being considered out of network with her insurance. NO ANSWER, NO VM, HUB TO READ.  no longer @ [facility], pcp @ [facility]

## 2024-02-05 DIAGNOSIS — M51.37 DEGENERATION OF LUMBOSACRAL INTERVERTEBRAL DISC: ICD-10-CM

## 2024-02-05 DIAGNOSIS — M96.1 LUMBAR POST-LAMINECTOMY SYNDROME: ICD-10-CM

## 2024-02-05 NOTE — TELEPHONE ENCOUNTER
Rx Refill Note  Requested Prescriptions     Pending Prescriptions Disp Refills    celecoxib (CeleBREX) 200 MG capsule [Pharmacy Med Name: celecoxib 200 mg capsule] 30 capsule 3     Sig: Take 1 capsule by mouth Daily.      Last office visit with prescribing clinician: 10/6/2023         Cheryl Mendez MA  02/05/24, 08:11 CST

## 2024-02-06 RX ORDER — CELECOXIB 200 MG/1
200 CAPSULE ORAL DAILY
Qty: 30 CAPSULE | Refills: 3 | Status: SHIPPED | OUTPATIENT
Start: 2024-02-06

## 2024-02-13 RX ORDER — ROSUVASTATIN CALCIUM 20 MG/1
20 TABLET, COATED ORAL
Qty: 90 TABLET | Refills: 1 | OUTPATIENT
Start: 2024-02-13

## 2024-04-22 DIAGNOSIS — J30.89 NON-SEASONAL ALLERGIC RHINITIS DUE TO OTHER ALLERGIC TRIGGER: ICD-10-CM

## 2024-04-22 RX ORDER — FLUTICASONE PROPIONATE 50 MCG
SPRAY, SUSPENSION (ML) NASAL
Qty: 16 G | Refills: 0 | Status: SHIPPED | OUTPATIENT
Start: 2024-04-22

## 2024-06-26 DIAGNOSIS — J30.89 NON-SEASONAL ALLERGIC RHINITIS DUE TO OTHER ALLERGIC TRIGGER: ICD-10-CM

## 2024-06-26 RX ORDER — FLUTICASONE PROPIONATE 50 MCG
SPRAY, SUSPENSION (ML) NASAL
Qty: 16 G | Refills: 0 | OUTPATIENT
Start: 2024-06-26

## 2025-02-14 ENCOUNTER — TELEPHONE (OUTPATIENT)
Age: 60
End: 2025-02-14

## 2025-08-21 ENCOUNTER — TELEPHONE (OUTPATIENT)
Dept: NEUROSURGERY | Age: 60
End: 2025-08-21

## (undated) DEVICE — MASK,OXYGEN,MED CONC,ADLT,7' TUB, UC: Brand: PENDING

## (undated) DEVICE — SNAR POLYP SENSATION MICRO OVL 13 240X40

## (undated) DEVICE — TBG SMPL FLTR LINE NASL 02/C02 A/ BX/100

## (undated) DEVICE — YANKAUER,BULB TIP WITH VENT: Brand: ARGYLE

## (undated) DEVICE — SENSR O2 OXIMAX FNGR A/ 18IN NONSTR

## (undated) DEVICE — CUFF,BP,DISP,1 TUBE,ADULT,HP: Brand: MEDLINE

## (undated) DEVICE — THE CHANNEL CLEANING BRUSH IS A NYLON FLEXI BRUSH ATTACHED TO A FLEXIBLE PLASTIC SHEATH DESIGNED TO SAFELY REMOVE DEBRIS FROM FLEXIBLE ENDOSCOPES.

## (undated) DEVICE — THE SINGLE USE ETRAP – POLYP TRAP IS USED FOR SUCTION RETRIEVAL OF ENDOSCOPICALLY REMOVED POLYPS.: Brand: ETRAP

## (undated) DEVICE — Device: Brand: DEFENDO AIR/WATER/SUCTION AND BIOPSY VALVE

## (undated) DEVICE — CONMED SCOPE SAVER BITE BLOCK, 20X27 MM: Brand: SCOPE SAVER

## (undated) DEVICE — FRCP BX RADJAW4 NDL 2.8 240 STD OG